# Patient Record
Sex: FEMALE | Race: WHITE | NOT HISPANIC OR LATINO | Employment: UNEMPLOYED | URBAN - NONMETROPOLITAN AREA
[De-identification: names, ages, dates, MRNs, and addresses within clinical notes are randomized per-mention and may not be internally consistent; named-entity substitution may affect disease eponyms.]

---

## 2019-02-21 ENCOUNTER — HOSPITAL ENCOUNTER (EMERGENCY)
Facility: HOSPITAL | Age: 84
Discharge: HOME | End: 2019-02-22
Attending: EMERGENCY MEDICINE | Admitting: EMERGENCY MEDICINE

## 2019-02-21 ENCOUNTER — APPOINTMENT (OUTPATIENT)
Dept: GENERAL RADIOLOGY | Facility: HOSPITAL | Age: 84
End: 2019-02-21

## 2019-02-21 ENCOUNTER — APPOINTMENT (OUTPATIENT)
Dept: CT IMAGING | Facility: HOSPITAL | Age: 84
End: 2019-02-21

## 2019-02-21 DIAGNOSIS — I10 ESSENTIAL HYPERTENSION: ICD-10-CM

## 2019-02-21 DIAGNOSIS — S22.41XA CLOSED FRACTURE OF MULTIPLE RIBS OF RIGHT SIDE, INITIAL ENCOUNTER: ICD-10-CM

## 2019-02-21 DIAGNOSIS — S22.080A T12 COMPRESSION FRACTURE (HCC): ICD-10-CM

## 2019-02-21 DIAGNOSIS — R42 VERTIGO: Primary | ICD-10-CM

## 2019-02-21 LAB
ALBUMIN SERPL-MCNC: 4.1 G/DL (ref 3.4–4.8)
ALBUMIN/GLOB SERPL: 1.2 G/DL (ref 1.5–2.5)
ALP SERPL-CCNC: 85 U/L (ref 35–104)
ALT SERPL W P-5'-P-CCNC: 12 U/L (ref 10–36)
ANION GAP SERPL CALCULATED.3IONS-SCNC: 10.5 MMOL/L (ref 3.6–11.2)
AST SERPL-CCNC: 22 U/L (ref 10–30)
BASOPHILS # BLD AUTO: 0.02 10*3/MM3 (ref 0–0.3)
BASOPHILS NFR BLD AUTO: 0.2 % (ref 0–2)
BILIRUB SERPL-MCNC: 0.4 MG/DL (ref 0.2–1.8)
BILIRUB UR QL STRIP: NEGATIVE
BUN BLD-MCNC: 28 MG/DL (ref 7–21)
BUN/CREAT SERPL: 24.3 (ref 7–25)
CALCIUM SPEC-SCNC: 9.5 MG/DL (ref 7.7–10)
CHLORIDE SERPL-SCNC: 106 MMOL/L (ref 99–112)
CLARITY UR: CLEAR
CO2 SERPL-SCNC: 24.5 MMOL/L (ref 24.3–31.9)
COLOR UR: NORMAL
CREAT BLD-MCNC: 1.15 MG/DL (ref 0.43–1.29)
DEPRECATED RDW RBC AUTO: 48.5 FL (ref 37–54)
EOSINOPHIL # BLD AUTO: 0.64 10*3/MM3 (ref 0–0.7)
EOSINOPHIL NFR BLD AUTO: 7.8 % (ref 0–7)
ERYTHROCYTE [DISTWIDTH] IN BLOOD BY AUTOMATED COUNT: 14.9 % (ref 11.5–14.5)
GFR SERPL CREATININE-BSD FRML MDRD: 44 ML/MIN/1.73
GLOBULIN UR ELPH-MCNC: 3.3 GM/DL
GLUCOSE BLD-MCNC: 98 MG/DL (ref 70–110)
GLUCOSE BLDC GLUCOMTR-MCNC: 89 MG/DL (ref 70–130)
GLUCOSE UR STRIP-MCNC: NEGATIVE MG/DL
HCT VFR BLD AUTO: 38.2 % (ref 37–47)
HGB BLD-MCNC: 12.2 G/DL (ref 12–16)
HGB UR QL STRIP.AUTO: NEGATIVE
HOLD SPECIMEN: NORMAL
HOLD SPECIMEN: NORMAL
IMM GRANULOCYTES # BLD AUTO: 0.02 10*3/MM3 (ref 0–0.03)
IMM GRANULOCYTES NFR BLD AUTO: 0.2 % (ref 0–0.5)
KETONES UR QL STRIP: NEGATIVE
LEUKOCYTE ESTERASE UR QL STRIP.AUTO: NEGATIVE
LYMPHOCYTES # BLD AUTO: 1.56 10*3/MM3 (ref 1–3)
LYMPHOCYTES NFR BLD AUTO: 19 % (ref 16–46)
MAGNESIUM SERPL-MCNC: 2.5 MG/DL (ref 1.7–2.6)
MCH RBC QN AUTO: 29.5 PG (ref 27–33)
MCHC RBC AUTO-ENTMCNC: 31.9 G/DL (ref 33–37)
MCV RBC AUTO: 92.3 FL (ref 80–94)
MONOCYTES # BLD AUTO: 0.8 10*3/MM3 (ref 0.1–0.9)
MONOCYTES NFR BLD AUTO: 9.8 % (ref 0–12)
NEUTROPHILS # BLD AUTO: 5.15 10*3/MM3 (ref 1.4–6.5)
NEUTROPHILS NFR BLD AUTO: 63 % (ref 40–75)
NITRITE UR QL STRIP: NEGATIVE
OSMOLALITY SERPL CALC.SUM OF ELEC: 286.7 MOSM/KG (ref 273–305)
PH UR STRIP.AUTO: 7 [PH] (ref 5–8)
PLATELET # BLD AUTO: 291 10*3/MM3 (ref 130–400)
PMV BLD AUTO: 9.6 FL (ref 6–10)
POTASSIUM BLD-SCNC: 4 MMOL/L (ref 3.5–5.3)
PROT SERPL-MCNC: 7.4 G/DL (ref 6–8)
PROT UR QL STRIP: NEGATIVE
RBC # BLD AUTO: 4.14 10*6/MM3 (ref 4.2–5.4)
SODIUM BLD-SCNC: 141 MMOL/L (ref 135–153)
SP GR UR STRIP: 1.01 (ref 1–1.03)
TROPONIN I SERPL-MCNC: 0.01 NG/ML
UROBILINOGEN UR QL STRIP: NORMAL
WBC NRBC COR # BLD: 8.19 10*3/MM3 (ref 4.5–12.5)
WHOLE BLOOD HOLD SPECIMEN: NORMAL
WHOLE BLOOD HOLD SPECIMEN: NORMAL

## 2019-02-21 PROCEDURE — 82962 GLUCOSE BLOOD TEST: CPT

## 2019-02-21 PROCEDURE — 80053 COMPREHEN METABOLIC PANEL: CPT | Performed by: EMERGENCY MEDICINE

## 2019-02-21 PROCEDURE — 96361 HYDRATE IV INFUSION ADD-ON: CPT

## 2019-02-21 PROCEDURE — 72128 CT CHEST SPINE W/O DYE: CPT

## 2019-02-21 PROCEDURE — 84484 ASSAY OF TROPONIN QUANT: CPT | Performed by: EMERGENCY MEDICINE

## 2019-02-21 PROCEDURE — 36415 COLL VENOUS BLD VENIPUNCTURE: CPT

## 2019-02-21 PROCEDURE — 93005 ELECTROCARDIOGRAM TRACING: CPT | Performed by: EMERGENCY MEDICINE

## 2019-02-21 PROCEDURE — 81003 URINALYSIS AUTO W/O SCOPE: CPT | Performed by: EMERGENCY MEDICINE

## 2019-02-21 PROCEDURE — 73523 X-RAY EXAM HIPS BI 5/> VIEWS: CPT

## 2019-02-21 PROCEDURE — 71260 CT THORAX DX C+: CPT

## 2019-02-21 PROCEDURE — 72125 CT NECK SPINE W/O DYE: CPT

## 2019-02-21 PROCEDURE — 72131 CT LUMBAR SPINE W/O DYE: CPT | Performed by: RADIOLOGY

## 2019-02-21 PROCEDURE — 96374 THER/PROPH/DIAG INJ IV PUSH: CPT

## 2019-02-21 PROCEDURE — 72131 CT LUMBAR SPINE W/O DYE: CPT

## 2019-02-21 PROCEDURE — 25010000002 IOPAMIDOL 61 % SOLUTION: Performed by: EMERGENCY MEDICINE

## 2019-02-21 PROCEDURE — 71260 CT THORAX DX C+: CPT | Performed by: RADIOLOGY

## 2019-02-21 PROCEDURE — 72128 CT CHEST SPINE W/O DYE: CPT | Performed by: RADIOLOGY

## 2019-02-21 PROCEDURE — 73523 X-RAY EXAM HIPS BI 5/> VIEWS: CPT | Performed by: RADIOLOGY

## 2019-02-21 PROCEDURE — 71045 X-RAY EXAM CHEST 1 VIEW: CPT | Performed by: RADIOLOGY

## 2019-02-21 PROCEDURE — 25010000002 HYDRALAZINE PER 20 MG: Performed by: PHYSICIAN ASSISTANT

## 2019-02-21 PROCEDURE — 99285 EMERGENCY DEPT VISIT HI MDM: CPT

## 2019-02-21 PROCEDURE — 93010 ELECTROCARDIOGRAM REPORT: CPT | Performed by: INTERNAL MEDICINE

## 2019-02-21 PROCEDURE — 70450 CT HEAD/BRAIN W/O DYE: CPT | Performed by: RADIOLOGY

## 2019-02-21 PROCEDURE — 83735 ASSAY OF MAGNESIUM: CPT | Performed by: EMERGENCY MEDICINE

## 2019-02-21 PROCEDURE — 70450 CT HEAD/BRAIN W/O DYE: CPT

## 2019-02-21 PROCEDURE — 72125 CT NECK SPINE W/O DYE: CPT | Performed by: RADIOLOGY

## 2019-02-21 PROCEDURE — 71045 X-RAY EXAM CHEST 1 VIEW: CPT

## 2019-02-21 PROCEDURE — 85025 COMPLETE CBC W/AUTO DIFF WBC: CPT | Performed by: FAMILY MEDICINE

## 2019-02-21 RX ORDER — SODIUM CHLORIDE 9 MG/ML
125 INJECTION, SOLUTION INTRAVENOUS CONTINUOUS
Status: DISCONTINUED | OUTPATIENT
Start: 2019-02-21 | End: 2019-02-22 | Stop reason: HOSPADM

## 2019-02-21 RX ORDER — SODIUM CHLORIDE 0.9 % (FLUSH) 0.9 %
10 SYRINGE (ML) INJECTION AS NEEDED
Status: DISCONTINUED | OUTPATIENT
Start: 2019-02-21 | End: 2019-02-22 | Stop reason: HOSPADM

## 2019-02-21 RX ORDER — HYDRALAZINE HYDROCHLORIDE 20 MG/ML
10 INJECTION INTRAMUSCULAR; INTRAVENOUS ONCE
Status: COMPLETED | OUTPATIENT
Start: 2019-02-21 | End: 2019-02-21

## 2019-02-21 RX ADMIN — IOPAMIDOL 90 ML: 612 INJECTION, SOLUTION INTRAVENOUS at 23:19

## 2019-02-21 RX ADMIN — HYDRALAZINE HYDROCHLORIDE 10 MG: 20 INJECTION INTRAMUSCULAR; INTRAVENOUS at 21:01

## 2019-02-21 RX ADMIN — SODIUM CHLORIDE 125 ML/HR: 9 INJECTION, SOLUTION INTRAVENOUS at 21:00

## 2019-02-22 VITALS
OXYGEN SATURATION: 99 % | WEIGHT: 100 LBS | RESPIRATION RATE: 16 BRPM | SYSTOLIC BLOOD PRESSURE: 169 MMHG | HEART RATE: 72 BPM | HEIGHT: 61 IN | DIASTOLIC BLOOD PRESSURE: 87 MMHG | TEMPERATURE: 98.2 F | BODY MASS INDEX: 18.88 KG/M2

## 2019-02-22 RX ORDER — MECLIZINE HYDROCHLORIDE 25 MG/1
25 TABLET ORAL 3 TIMES DAILY PRN
Qty: 21 TABLET | Refills: 0 | Status: SHIPPED | OUTPATIENT
Start: 2019-02-22 | End: 2019-03-01

## 2019-02-22 RX ORDER — HYDROCHLOROTHIAZIDE 12.5 MG/1
12.5 TABLET ORAL DAILY
Qty: 7 TABLET | Refills: 0 | Status: SHIPPED | OUTPATIENT
Start: 2019-02-22 | End: 2019-03-01

## 2019-02-22 RX ORDER — ACETAMINOPHEN AND CODEINE PHOSPHATE 300; 30 MG/1; MG/1
1 TABLET ORAL EVERY 6 HOURS PRN
Qty: 12 TABLET | Refills: 0 | Status: SHIPPED | OUTPATIENT
Start: 2019-02-22 | End: 2019-02-25

## 2019-02-22 NOTE — ED NOTES
Pt resting quietly on stretcher with eyes closed.  Pt AAOx4 with no resp distress noted, respirations even and unlabored.  Pt denies any needs at this time.  Skin PWD.  Pt family at bedside. Will continue to monitor and follow plan of care.  Bed rails up x2, bed in lowest position, call light in reach.           Michelle Thomas, RN  02/22/19 3314

## 2019-02-22 NOTE — DISCHARGE INSTRUCTIONS
Please take meclizine as needed for dizziness. Please use your blood pressure pill daily. Please take pain medication as needed. Please see one of the following PCPs in 2 days and see spine specialist at  as well or return to ER if symptoms worsen.

## 2019-02-22 NOTE — ED NOTES
Pt resting quietly on stretcher with eyes closed.  No resp distress noted, respirations even and unlabored.  Pt denies any needs at this time.  Skin PWD.  Pt family at bedside. Will continue to monitor and follow plan of care.  Bed rails up x2, bed in lowest position, call light in reach.           Michelle Thomas, RN  02/22/19 2570

## 2019-02-22 NOTE — ED NOTES
Pt resting quietly on stretcher with no complaints.  Pt AAOx4 with no resp distress noted, respirations even and unlabored.  Pt denies any needs at this time.  Skin PWD.  Pt family at bedside. Will continue to monitor and follow plan of care.  Bed rails up x2, bed in lowest position, call light in reach.           Michelle Thomas, RN  02/22/19 4229

## 2019-02-22 NOTE — ED PROVIDER NOTES
Subjective   Patient is oriented to self, location and date.         Fall   Mechanism of injury: fall    Injury location:  Head/neck (back)  Head/neck injury location:  Head, R neck and L neck (Back)  Incident location:  Home  Time since incident:  2 days  Arrived directly from scene: no    Fall:     Fall occurred:  Walking (Unwittnessed but patient said she just became dizzy and fell; her brother found her on the floor)    Impact surface:  Carpet    Point of impact:  Back    Entrapped after fall: no    Suspicion of alcohol use: no    Suspicion of drug use: no    Tetanus status:  Up to date  Associated symptoms: back pain and headaches    Associated symptoms: no abdominal pain, no blindness, no chest pain, no difficulty breathing, no hearing loss, no loss of consciousness, no nausea, no neck pain, no seizures and no vomiting    Risk factors: no AICD, no anticoagulation therapy, no asthma, no beta blocker therapy, no CABG, no CAD, no CHF, no COPD, no diabetes, no dialysis, no hemophilia, no kidney disease, no pacemaker, no past MI, not pregnant and no steroid use        Review of Systems   Constitutional: Negative.  Negative for fever.   HENT: Negative for hearing loss.    Eyes: Negative for blindness.   Respiratory: Negative.    Cardiovascular: Negative.  Negative for chest pain.   Gastrointestinal: Negative.  Negative for abdominal pain, nausea and vomiting.   Endocrine: Negative.    Genitourinary: Negative.  Negative for dysuria.   Musculoskeletal: Positive for back pain. Negative for gait problem, joint swelling, myalgias, neck pain and neck stiffness.   Skin: Negative.    Neurological: Positive for dizziness and headaches. Negative for tremors, seizures, loss of consciousness, syncope, facial asymmetry, speech difficulty, weakness, light-headedness and numbness.   Psychiatric/Behavioral: Negative.    All other systems reviewed and are negative.      No past medical history on file.    No Known Allergies    No  past surgical history on file.    Family History   Family history unknown: Yes       Social History     Socioeconomic History   • Marital status:      Spouse name: Not on file   • Number of children: Not on file   • Years of education: Not on file   • Highest education level: Not on file   Tobacco Use   • Smoking status: Never Smoker   Substance and Sexual Activity   • Alcohol use: No   • Drug use: No   • Sexual activity: Defer           Objective   Physical Exam   Constitutional: She is oriented to person, place, and time. She appears well-developed and well-nourished. No distress.   HENT:   Head: Normocephalic and atraumatic.   Right Ear: External ear normal.   Left Ear: External ear normal.   Nose: Nose normal.   Eyes: Conjunctivae and EOM are normal. Pupils are equal, round, and reactive to light.   Neck: Normal range of motion. Neck supple. No JVD present. No tracheal deviation present.   Cardiovascular: Normal rate, regular rhythm and normal heart sounds. Exam reveals no gallop and no friction rub.   No murmur heard.  Pulmonary/Chest: Effort normal and breath sounds normal. No stridor. No respiratory distress. She has no wheezes. She has no rales. She exhibits no tenderness.   Abdominal: Soft. Bowel sounds are normal. She exhibits no distension and no mass. There is no tenderness. There is no guarding.   Musculoskeletal: Normal range of motion. She exhibits no edema, tenderness or deformity.   Good ROM in bilateral hips with no bruising or tenderness to palpation    Neurological: She is alert and oriented to person, place, and time. She is not disoriented. She displays normal reflexes. No cranial nerve deficit or sensory deficit. Coordination and gait normal.   Normal EOMs and pupillary reflexes bilaterally. 5/5  and plantar flexion strength bilaterally. Can elevate all 4 extremities and hold them at 90 degrees. No facial asymmetry or slurred speech.    Skin: Skin is warm and dry. No rash noted. She  is not diaphoretic. No erythema. No pallor.   Psychiatric: She has a normal mood and affect. Her behavior is normal. Thought content normal.   Nursing note and vitals reviewed.      Splint - Cast - Strapping  Date/Time: 2/22/2019 1:07 AM  Performed by: Leelee Hernandez PA  Authorized by: Max Sprague MD     Consent:     Consent obtained:  Verbal    Consent given by:  Patient    Risks discussed:  Discoloration, numbness, pain and swelling    Alternatives discussed:  Referral, observation, alternative treatment, delayed treatment and no treatment  Pre-procedure details:     Sensation:  Normal    Skin color:  Normal   Procedure details:     Location: Back.    Strapping: no      Splint type: Back.    Supplies:  Prefabricated splint  Post-procedure details:     Pain:  Improved    Sensation:  Normal    Skin color:  Normal    Patient tolerance of procedure:  Tolerated well, no immediate complications               ED Course  ED Course as of Feb 22 0108   Thu Feb 21, 2019   2211 Heart shadow that is abnormal; needs CT for further eval per Dr. Sprague.  XR Chest 1 View [MM]   2213 No acute fracture per Dr. Sprague.  XR Hips Bilateral With or Without Pelvis 5 View [MM]   2215 Acute fracture of the T12 vertebral body with 5-6 mm posterior displacement of the T12 vertebral body into the adjacent thoracic spinal canal; fractures of the right fifth and sixth posterior ribs per [facility] report.  CT Thoracic Spine Without Contrast [MM]   2217 Acute fracture of the T12 vertebral body with 5-6 mm posterior displacement of the T12 vertebral body into the adjacent thoracic spinal canal; severe central canal stenosis at L4-5; mild to moderate central canal stenosis at L3-4 per [facility] report.  CT Lumbar Spine Without Contrast [MM]   2313 No acute fracture or subluxation per [facility] report.  CT Cervical Spine Without Contrast [MM]   2326 Normal sinus rhythm; non specific T wave changes per Dr. Kay.  ECG 12 Lead [MM]   2340  Bilateral trace pleural fluid with minimal posterolateral right lung base atelectasis; T12 compression fracture; fractures of right 5th and 6th posterior ribs per [facility] report.  CT Chest With Contrast [MM]   2341 No acute intracranial findings; diffuse cerebral atrophy and age related periventricular white matter changes per [facility] report.  CT Head Without Contrast [MM]   Fri Feb 22, 2019   0015 Spoke with Dr. Fu spine at ; he says that since that patient is 89 years old, has no neuro defecits and is ambulatory, that her T12 fracture can be treated outpatient at their facility under the care of Dr. Mcleod.   [MM]   0032 Patient diagnosed with T12 fracture, right 5th and 6th rib fractures, hypertension, vertigo. Will be d/c home with rx for HCTZ, meclizine, tylenol 3. Will see PCP in 2 days and spine specialist in 2 days at  or return to ER if symptoms worsen.  [MM]   0038 Valdemar reveals no previous prescriptions.   [MM]      ED Course User Index  [MM] Leelee Hernandez PA                  MDM  Number of Diagnoses or Management Options  Closed fracture of multiple ribs of right side, initial encounter:   Essential hypertension:   T12 compression fracture (CMS/HCC):   Vertigo:      Amount and/or Complexity of Data Reviewed  Clinical lab tests: ordered and reviewed  Tests in the radiology section of CPT®: ordered and reviewed  Discuss the patient with other providers: yes          Final diagnoses:   Vertigo   Essential hypertension   T12 compression fracture (CMS/HCC)   Closed fracture of multiple ribs of right side, initial encounter            Leelee Hernandez PA  02/22/19 0038       Leelee Hernandez PA  02/22/19 0108

## 2019-02-22 NOTE — ED NOTES
Pt resting quietly on stretcher with no complaints.  Pt AAOx4 with no resp distress noted, respirations even and unlabored.  Pt denies any needs at this time.  Skin PWD.  Pt family at bedside. Will continue to monitor and follow plan of care.  Bed rails up x2, bed in lowest position, call light in reach.           Michelle Thomas, RN  02/22/19 0066

## 2019-02-22 NOTE — ED NOTES
On hold with [facility], they are getting someone in spine to speak with Leelee.     Franca Thurman  02/22/19 0013

## 2019-08-03 ENCOUNTER — HOSPITAL ENCOUNTER (INPATIENT)
Facility: HOSPITAL | Age: 84
LOS: 5 days | Discharge: NURSING, REHAB OR LONG-TERM CARE | End: 2019-08-08
Attending: FAMILY MEDICINE | Admitting: INTERNAL MEDICINE

## 2019-08-03 ENCOUNTER — APPOINTMENT (OUTPATIENT)
Dept: GENERAL RADIOLOGY | Facility: HOSPITAL | Age: 84
End: 2019-08-03

## 2019-08-03 DIAGNOSIS — I16.1 HYPERTENSIVE EMERGENCY: Primary | ICD-10-CM

## 2019-08-03 DIAGNOSIS — I50.9 CONGESTIVE HEART FAILURE, UNSPECIFIED HF CHRONICITY, UNSPECIFIED HEART FAILURE TYPE (HCC): ICD-10-CM

## 2019-08-03 DIAGNOSIS — E86.0 DEHYDRATION: ICD-10-CM

## 2019-08-03 LAB
A-A DO2: 26.5 MMHG (ref 0–300)
ALBUMIN SERPL-MCNC: 3.89 G/DL (ref 3.5–5.2)
ALBUMIN/GLOB SERPL: 1 G/DL
ALP SERPL-CCNC: 84 U/L (ref 39–117)
ALT SERPL W P-5'-P-CCNC: 18 U/L (ref 1–33)
ANION GAP SERPL CALCULATED.3IONS-SCNC: 17.5 MMOL/L (ref 5–15)
ARTERIAL PATENCY WRIST A: ABNORMAL
AST SERPL-CCNC: 26 U/L (ref 1–32)
ATMOSPHERIC PRESS: 729 MMHG
BASE EXCESS BLDA CALC-SCNC: -2.4 MMOL/L
BASOPHILS # BLD AUTO: 0.01 10*3/MM3 (ref 0–0.2)
BASOPHILS NFR BLD AUTO: 0.1 % (ref 0–1.5)
BDY SITE: ABNORMAL
BILIRUB SERPL-MCNC: 0.4 MG/DL (ref 0.2–1.2)
BODY TEMPERATURE: 98.6 C
BUN BLD-MCNC: 38 MG/DL (ref 8–23)
BUN/CREAT SERPL: 26.4 (ref 7–25)
CALCIUM SPEC-SCNC: 9.5 MG/DL (ref 8.2–9.6)
CHLORIDE SERPL-SCNC: 102 MMOL/L (ref 98–107)
CO2 SERPL-SCNC: 23.5 MMOL/L (ref 22–29)
COHGB MFR BLD: 1.5 % (ref 0–5)
CREAT BLD-MCNC: 1.44 MG/DL (ref 0.57–1)
CRP SERPL-MCNC: 0.28 MG/DL (ref 0–0.5)
D-LACTATE SERPL-SCNC: 1.4 MMOL/L (ref 0.5–2)
DEPRECATED RDW RBC AUTO: 49.2 FL (ref 37–54)
EOSINOPHIL # BLD AUTO: 0.44 10*3/MM3 (ref 0–0.4)
EOSINOPHIL NFR BLD AUTO: 6.1 % (ref 0.3–6.2)
ERYTHROCYTE [DISTWIDTH] IN BLOOD BY AUTOMATED COUNT: 15 % (ref 12.3–15.4)
GFR SERPL CREATININE-BSD FRML MDRD: 34 ML/MIN/1.73
GLOBULIN UR ELPH-MCNC: 3.7 GM/DL
GLUCOSE BLD-MCNC: 95 MG/DL (ref 65–99)
HCO3 BLDA-SCNC: 21.2 MMOL/L (ref 22–26)
HCT VFR BLD AUTO: 42.1 % (ref 34–46.6)
HCT VFR BLD CALC: 40 % (ref 37–47)
HGB BLD-MCNC: 13.4 G/DL (ref 12–15.9)
HGB BLDA-MCNC: 13.5 G/DL (ref 12–16)
HOLD SPECIMEN: NORMAL
HOLD SPECIMEN: NORMAL
HOROWITZ INDEX BLD+IHG-RTO: 21 %
IMM GRANULOCYTES # BLD AUTO: 0.01 10*3/MM3 (ref 0–0.05)
IMM GRANULOCYTES NFR BLD AUTO: 0.1 % (ref 0–0.5)
LYMPHOCYTES # BLD AUTO: 1.39 10*3/MM3 (ref 0.7–3.1)
LYMPHOCYTES NFR BLD AUTO: 19.4 % (ref 19.6–45.3)
MCH RBC QN AUTO: 29.6 PG (ref 26.6–33)
MCHC RBC AUTO-ENTMCNC: 31.8 G/DL (ref 31.5–35.7)
MCV RBC AUTO: 93.1 FL (ref 79–97)
METHGB BLD QL: 0.2 % (ref 0–3)
MODALITY: ABNORMAL
MONOCYTES # BLD AUTO: 0.42 10*3/MM3 (ref 0.1–0.9)
MONOCYTES NFR BLD AUTO: 5.9 % (ref 5–12)
NEUTROPHILS # BLD AUTO: 4.9 10*3/MM3 (ref 1.7–7)
NEUTROPHILS NFR BLD AUTO: 68.4 % (ref 42.7–76)
NT-PROBNP SERPL-MCNC: 1929 PG/ML (ref 5–1800)
OXYHGB MFR BLDV: 93.6 % (ref 85–100)
PCO2 BLDA: 33.4 MM HG (ref 35–45)
PH BLDA: 7.42 PH UNITS (ref 7.35–7.45)
PLATELET # BLD AUTO: 233 10*3/MM3 (ref 140–450)
PMV BLD AUTO: 9.3 FL (ref 6–12)
PO2 BLDA: 76.7 MM HG (ref 80–100)
POTASSIUM BLD-SCNC: 4.3 MMOL/L (ref 3.5–5.2)
PROT SERPL-MCNC: 7.6 G/DL (ref 6–8.5)
RBC # BLD AUTO: 4.52 10*6/MM3 (ref 3.77–5.28)
SAO2 % BLDCOA: 95.2 % (ref 90–100)
SODIUM BLD-SCNC: 143 MMOL/L (ref 136–145)
TROPONIN T SERPL-MCNC: <0.01 NG/ML (ref 0–0.03)
WBC NRBC COR # BLD: 7.17 10*3/MM3 (ref 3.4–10.8)
WHOLE BLOOD HOLD SPECIMEN: NORMAL
WHOLE BLOOD HOLD SPECIMEN: NORMAL

## 2019-08-03 PROCEDURE — 36600 WITHDRAWAL OF ARTERIAL BLOOD: CPT | Performed by: NURSE PRACTITIONER

## 2019-08-03 PROCEDURE — 84484 ASSAY OF TROPONIN QUANT: CPT | Performed by: FAMILY MEDICINE

## 2019-08-03 PROCEDURE — 25010000002 HYDRALAZINE PER 20 MG: Performed by: NURSE PRACTITIONER

## 2019-08-03 PROCEDURE — 85025 COMPLETE CBC W/AUTO DIFF WBC: CPT | Performed by: FAMILY MEDICINE

## 2019-08-03 PROCEDURE — 99285 EMERGENCY DEPT VISIT HI MDM: CPT

## 2019-08-03 PROCEDURE — 80053 COMPREHEN METABOLIC PANEL: CPT | Performed by: FAMILY MEDICINE

## 2019-08-03 PROCEDURE — 83050 HGB METHEMOGLOBIN QUAN: CPT | Performed by: NURSE PRACTITIONER

## 2019-08-03 PROCEDURE — 83880 ASSAY OF NATRIURETIC PEPTIDE: CPT | Performed by: FAMILY MEDICINE

## 2019-08-03 PROCEDURE — 71045 X-RAY EXAM CHEST 1 VIEW: CPT

## 2019-08-03 PROCEDURE — 83605 ASSAY OF LACTIC ACID: CPT | Performed by: NURSE PRACTITIONER

## 2019-08-03 PROCEDURE — 86140 C-REACTIVE PROTEIN: CPT | Performed by: NURSE PRACTITIONER

## 2019-08-03 PROCEDURE — 82805 BLOOD GASES W/O2 SATURATION: CPT | Performed by: NURSE PRACTITIONER

## 2019-08-03 PROCEDURE — 87040 BLOOD CULTURE FOR BACTERIA: CPT | Performed by: NURSE PRACTITIONER

## 2019-08-03 PROCEDURE — 93005 ELECTROCARDIOGRAM TRACING: CPT | Performed by: FAMILY MEDICINE

## 2019-08-03 PROCEDURE — 93010 ELECTROCARDIOGRAM REPORT: CPT | Performed by: INTERNAL MEDICINE

## 2019-08-03 PROCEDURE — 82375 ASSAY CARBOXYHB QUANT: CPT | Performed by: NURSE PRACTITIONER

## 2019-08-03 RX ORDER — SODIUM CHLORIDE 0.9 % (FLUSH) 0.9 %
10 SYRINGE (ML) INJECTION AS NEEDED
Status: DISCONTINUED | OUTPATIENT
Start: 2019-08-03 | End: 2019-08-08 | Stop reason: HOSPADM

## 2019-08-03 RX ORDER — CLONIDINE HYDROCHLORIDE 0.1 MG/1
0.1 TABLET ORAL ONCE
Status: COMPLETED | OUTPATIENT
Start: 2019-08-03 | End: 2019-08-03

## 2019-08-03 RX ORDER — HYDRALAZINE HYDROCHLORIDE 20 MG/ML
10 INJECTION INTRAMUSCULAR; INTRAVENOUS ONCE
Status: COMPLETED | OUTPATIENT
Start: 2019-08-03 | End: 2019-08-03

## 2019-08-03 RX ADMIN — CLONIDINE HYDROCHLORIDE 0.1 MG: 0.1 TABLET ORAL at 21:00

## 2019-08-03 RX ADMIN — HYDRALAZINE HYDROCHLORIDE 10 MG: 20 INJECTION INTRAMUSCULAR; INTRAVENOUS at 22:22

## 2019-08-03 RX ADMIN — SODIUM CHLORIDE 250 ML: 9 INJECTION, SOLUTION INTRAVENOUS at 20:25

## 2019-08-03 NOTE — ED PROVIDER NOTES
Subjective   Family reports that patient has had shortness of breath and cough for about a week.  Report that patient has had some chest pain today. States that patient has had increasing confusion and is having more frequent falls. State that they feel patient would benefit from nursing home placement as they dont feel they can adequately take care of patient, that patient doesn't want anyone living with her, and they do not feel its safe from patient's increasing confusion and deteriorating health that she live alone.        History provided by:  Patient   used: No    Shortness of Breath   Severity:  Moderate  Onset quality:  Sudden  Duration:  1 week  Timing:  Constant  Progression:  Waxing and waning  Chronicity:  Recurrent  Context: not activity, not animal exposure, not emotional upset, not known allergens, not occupational exposure and not strong odors    Relieved by:  Nothing  Worsened by:  Nothing  Ineffective treatments:  None tried  Associated symptoms: cough and wheezing    Associated symptoms: no abdominal pain, no chest pain, no claudication, no fever, no headaches, no hemoptysis, no sore throat, no sputum production and no syncope    Risk factors: no recent alcohol use, no family hx of DVT, no hx of PE/DVT and no prolonged immobilization        Review of Systems   Constitutional: Negative.  Negative for fever.   HENT: Negative.  Negative for sore throat.    Eyes: Negative.    Respiratory: Positive for cough, shortness of breath and wheezing. Negative for hemoptysis and sputum production.    Cardiovascular: Negative.  Negative for chest pain, claudication and syncope.   Gastrointestinal: Negative.  Negative for abdominal pain.   Endocrine: Negative.    Genitourinary: Negative.    Musculoskeletal: Negative.    Skin: Negative.    Allergic/Immunologic: Negative.    Neurological: Negative.  Negative for headaches.   Hematological: Negative.    Psychiatric/Behavioral: Negative.         History reviewed. No pertinent past medical history.    No Known Allergies    History reviewed. No pertinent surgical history.    Family History   Family history unknown: Yes       Social History     Socioeconomic History   • Marital status:      Spouse name: Not on file   • Number of children: Not on file   • Years of education: Not on file   • Highest education level: Not on file   Tobacco Use   • Smoking status: Never Smoker   Substance and Sexual Activity   • Alcohol use: No   • Drug use: No   • Sexual activity: Defer           Objective   Physical Exam   Constitutional: She appears well-developed and well-nourished.   HENT:   Head: Normocephalic.   Right Ear: External ear normal.   Left Ear: External ear normal.   Mouth/Throat: Oropharynx is clear and moist.   Eyes: Conjunctivae and EOM are normal. Pupils are equal, round, and reactive to light.   Neck: Normal range of motion. Neck supple.   Cardiovascular: Normal rate, regular rhythm, normal heart sounds and intact distal pulses.   Pulmonary/Chest: Effort normal. She has wheezes.   Mildly decreased breath sounds     Abdominal: Soft. Bowel sounds are normal.   Musculoskeletal: Normal range of motion.   Neurological: She is alert.   Oriented to person and place    Skin: Skin is warm and dry. Capillary refill takes less than 2 seconds.   Psychiatric: She has a normal mood and affect. Her behavior is normal. Thought content normal.   Nursing note and vitals reviewed.      Procedures           ED Course  ED Course as of Aug 08 2002   Sat Aug 03, 2019   2111 Discussed with Dr. Narvaez. Advises to contact with blood pressure in 30-45 minutes and she would decide further for admission to see if clonidine helps blood pressure  [MARLINE]   2214 Ordered 10mg IV hydralazine.  BP: (!) 196/117 [EVELYN]      ED Course User Index  [MARLINE] David Rodríguez, APRN  [EVELYN] Ashlee Hudson, APRN                  [facility]      Final diagnoses:   Hypertensive emergency   Dehydration    Congestive heart failure, unspecified HF chronicity, unspecified heart failure type (CMS/[facility])            David Rodríguez, APRN  08/08/19 2003

## 2019-08-04 ENCOUNTER — APPOINTMENT (OUTPATIENT)
Dept: CARDIOLOGY | Facility: HOSPITAL | Age: 84
End: 2019-08-04

## 2019-08-04 ENCOUNTER — APPOINTMENT (OUTPATIENT)
Dept: CT IMAGING | Facility: HOSPITAL | Age: 84
End: 2019-08-04

## 2019-08-04 LAB
ALBUMIN SERPL-MCNC: 3.47 G/DL (ref 3.5–5.2)
ALBUMIN/GLOB SERPL: 1 G/DL
ALP SERPL-CCNC: 77 U/L (ref 39–117)
ALT SERPL W P-5'-P-CCNC: 16 U/L (ref 1–33)
ANION GAP SERPL CALCULATED.3IONS-SCNC: 14.4 MMOL/L (ref 5–15)
AST SERPL-CCNC: 23 U/L (ref 1–32)
BACTERIA UR QL AUTO: ABNORMAL /HPF
BASOPHILS # BLD AUTO: 0.02 10*3/MM3 (ref 0–0.2)
BASOPHILS NFR BLD AUTO: 0.2 % (ref 0–1.5)
BILIRUB SERPL-MCNC: 0.4 MG/DL (ref 0.2–1.2)
BILIRUB UR QL STRIP: NEGATIVE
BUN BLD-MCNC: 36 MG/DL (ref 8–23)
BUN/CREAT SERPL: 27.9 (ref 7–25)
CALCIUM SPEC-SCNC: 9.3 MG/DL (ref 8.2–9.6)
CHLORIDE SERPL-SCNC: 106 MMOL/L (ref 98–107)
CLARITY UR: CLEAR
CO2 SERPL-SCNC: 21.6 MMOL/L (ref 22–29)
COLOR UR: YELLOW
CREAT BLD-MCNC: 1.29 MG/DL (ref 0.57–1)
DEPRECATED RDW RBC AUTO: 49.4 FL (ref 37–54)
EOSINOPHIL # BLD AUTO: 0.83 10*3/MM3 (ref 0–0.4)
EOSINOPHIL NFR BLD AUTO: 8.8 % (ref 0.3–6.2)
ERYTHROCYTE [DISTWIDTH] IN BLOOD BY AUTOMATED COUNT: 15 % (ref 12.3–15.4)
FOLATE SERPL-MCNC: 16.5 NG/ML (ref 4.78–24.2)
GFR SERPL CREATININE-BSD FRML MDRD: 39 ML/MIN/1.73
GLOBULIN UR ELPH-MCNC: 3.6 GM/DL
GLUCOSE BLD-MCNC: 94 MG/DL (ref 65–99)
GLUCOSE UR STRIP-MCNC: NEGATIVE MG/DL
HCT VFR BLD AUTO: 40.7 % (ref 34–46.6)
HGB BLD-MCNC: 12.8 G/DL (ref 12–15.9)
HGB UR QL STRIP.AUTO: NEGATIVE
HYALINE CASTS UR QL AUTO: ABNORMAL /LPF
IMM GRANULOCYTES # BLD AUTO: 0.01 10*3/MM3 (ref 0–0.05)
IMM GRANULOCYTES NFR BLD AUTO: 0.1 % (ref 0–0.5)
KETONES UR QL STRIP: ABNORMAL
LEUKOCYTE ESTERASE UR QL STRIP.AUTO: ABNORMAL
LYMPHOCYTES # BLD AUTO: 1.52 10*3/MM3 (ref 0.7–3.1)
LYMPHOCYTES NFR BLD AUTO: 16.1 % (ref 19.6–45.3)
MCH RBC QN AUTO: 29.6 PG (ref 26.6–33)
MCHC RBC AUTO-ENTMCNC: 31.4 G/DL (ref 31.5–35.7)
MCV RBC AUTO: 94 FL (ref 79–97)
MONOCYTES # BLD AUTO: 0.83 10*3/MM3 (ref 0.1–0.9)
MONOCYTES NFR BLD AUTO: 8.8 % (ref 5–12)
NEUTROPHILS # BLD AUTO: 6.25 10*3/MM3 (ref 1.7–7)
NEUTROPHILS NFR BLD AUTO: 66 % (ref 42.7–76)
NITRITE UR QL STRIP: NEGATIVE
PH UR STRIP.AUTO: <=5 [PH] (ref 5–8)
PLATELET # BLD AUTO: 242 10*3/MM3 (ref 140–450)
PMV BLD AUTO: 9.6 FL (ref 6–12)
POTASSIUM BLD-SCNC: 4.1 MMOL/L (ref 3.5–5.2)
PROT SERPL-MCNC: 7.1 G/DL (ref 6–8.5)
PROT UR QL STRIP: ABNORMAL
RBC # BLD AUTO: 4.33 10*6/MM3 (ref 3.77–5.28)
RBC # UR: ABNORMAL /HPF
REF LAB TEST METHOD: ABNORMAL
SODIUM BLD-SCNC: 142 MMOL/L (ref 136–145)
SP GR UR STRIP: 1.02 (ref 1–1.03)
SQUAMOUS #/AREA URNS HPF: ABNORMAL /HPF
TROPONIN T SERPL-MCNC: <0.01 NG/ML (ref 0–0.03)
TSH SERPL DL<=0.05 MIU/L-ACNC: 2.41 MIU/ML (ref 0.27–4.2)
UROBILINOGEN UR QL STRIP: ABNORMAL
VIT B12 BLD-MCNC: 473 PG/ML (ref 211–946)
WBC NRBC COR # BLD: 9.46 10*3/MM3 (ref 3.4–10.8)
WBC UR QL AUTO: ABNORMAL /HPF

## 2019-08-04 PROCEDURE — 99223 1ST HOSP IP/OBS HIGH 75: CPT | Performed by: INTERNAL MEDICINE

## 2019-08-04 PROCEDURE — 70450 CT HEAD/BRAIN W/O DYE: CPT

## 2019-08-04 PROCEDURE — 94799 UNLISTED PULMONARY SVC/PX: CPT

## 2019-08-04 PROCEDURE — 82607 VITAMIN B-12: CPT | Performed by: INTERNAL MEDICINE

## 2019-08-04 PROCEDURE — 84443 ASSAY THYROID STIM HORMONE: CPT | Performed by: INTERNAL MEDICINE

## 2019-08-04 PROCEDURE — 82746 ASSAY OF FOLIC ACID SERUM: CPT | Performed by: INTERNAL MEDICINE

## 2019-08-04 PROCEDURE — 25010000002 HEPARIN (PORCINE) PER 1000 UNITS: Performed by: INTERNAL MEDICINE

## 2019-08-04 PROCEDURE — 85025 COMPLETE CBC W/AUTO DIFF WBC: CPT | Performed by: INTERNAL MEDICINE

## 2019-08-04 PROCEDURE — 81001 URINALYSIS AUTO W/SCOPE: CPT | Performed by: NURSE PRACTITIONER

## 2019-08-04 PROCEDURE — 99221 1ST HOSP IP/OBS SF/LOW 40: CPT | Performed by: PSYCHIATRY & NEUROLOGY

## 2019-08-04 PROCEDURE — 70450 CT HEAD/BRAIN W/O DYE: CPT | Performed by: RADIOLOGY

## 2019-08-04 PROCEDURE — 80053 COMPREHEN METABOLIC PANEL: CPT | Performed by: INTERNAL MEDICINE

## 2019-08-04 PROCEDURE — 84484 ASSAY OF TROPONIN QUANT: CPT | Performed by: INTERNAL MEDICINE

## 2019-08-04 RX ORDER — SODIUM CHLORIDE 0.9 % (FLUSH) 0.9 %
3-10 SYRINGE (ML) INJECTION AS NEEDED
Status: DISCONTINUED | OUTPATIENT
Start: 2019-08-04 | End: 2019-08-08 | Stop reason: HOSPADM

## 2019-08-04 RX ORDER — SODIUM CHLORIDE 0.9 % (FLUSH) 0.9 %
3 SYRINGE (ML) INJECTION EVERY 12 HOURS SCHEDULED
Status: DISCONTINUED | OUTPATIENT
Start: 2019-08-04 | End: 2019-08-08 | Stop reason: HOSPADM

## 2019-08-04 RX ORDER — NITROGLYCERIN 0.4 MG/1
0.4 TABLET SUBLINGUAL
Status: DISCONTINUED | OUTPATIENT
Start: 2019-08-04 | End: 2019-08-08 | Stop reason: HOSPADM

## 2019-08-04 RX ORDER — SODIUM CHLORIDE 9 MG/ML
50 INJECTION, SOLUTION INTRAVENOUS CONTINUOUS
Status: DISCONTINUED | OUTPATIENT
Start: 2019-08-04 | End: 2019-08-05

## 2019-08-04 RX ORDER — AMLODIPINE BESYLATE 5 MG/1
5 TABLET ORAL
Status: DISCONTINUED | OUTPATIENT
Start: 2019-08-04 | End: 2019-08-08 | Stop reason: HOSPADM

## 2019-08-04 RX ORDER — HEPARIN SODIUM 5000 [USP'U]/ML
5000 INJECTION, SOLUTION INTRAVENOUS; SUBCUTANEOUS EVERY 12 HOURS SCHEDULED
Status: DISCONTINUED | OUTPATIENT
Start: 2019-08-04 | End: 2019-08-08 | Stop reason: HOSPADM

## 2019-08-04 RX ORDER — CHOLECALCIFEROL (VITAMIN D3) 125 MCG
2.5 CAPSULE ORAL NIGHTLY PRN
Status: DISCONTINUED | OUTPATIENT
Start: 2019-08-04 | End: 2019-08-06

## 2019-08-04 RX ADMIN — SODIUM CHLORIDE, PRESERVATIVE FREE 3 ML: 5 INJECTION INTRAVENOUS at 02:16

## 2019-08-04 RX ADMIN — AMLODIPINE BESYLATE 5 MG: 5 TABLET ORAL at 16:24

## 2019-08-04 RX ADMIN — SODIUM CHLORIDE, PRESERVATIVE FREE 3 ML: 5 INJECTION INTRAVENOUS at 20:01

## 2019-08-04 RX ADMIN — HEPARIN SODIUM 5000 UNITS: 5000 INJECTION INTRAVENOUS; SUBCUTANEOUS at 20:01

## 2019-08-04 RX ADMIN — SODIUM CHLORIDE 50 ML/HR: 9 INJECTION, SOLUTION INTRAVENOUS at 08:28

## 2019-08-04 RX ADMIN — HEPARIN SODIUM 5000 UNITS: 5000 INJECTION INTRAVENOUS; SUBCUTANEOUS at 02:23

## 2019-08-04 RX ADMIN — HEPARIN SODIUM 5000 UNITS: 5000 INJECTION INTRAVENOUS; SUBCUTANEOUS at 08:32

## 2019-08-04 RX ADMIN — SODIUM CHLORIDE 50 ML/HR: 9 INJECTION, SOLUTION INTRAVENOUS at 02:23

## 2019-08-04 NOTE — PLAN OF CARE
Problem: Patient Care Overview  Goal: Plan of Care Review  Outcome: Ongoing (interventions implemented as appropriate)    Goal: Individualization and Mutuality  Outcome: Ongoing (interventions implemented as appropriate)    Goal: Discharge Needs Assessment  Outcome: Ongoing (interventions implemented as appropriate)      Problem: Fall Risk (Adult)  Goal: Identify Related Risk Factors and Signs and Symptoms  Outcome: Ongoing (interventions implemented as appropriate)    Goal: Absence of Fall  Outcome: Ongoing (interventions implemented as appropriate)      Problem: Hypertensive Disease/Crisis (Arterial) (Adult)  Goal: Signs and Symptoms of Listed Potential Problems Will be Absent, Minimized or Managed (Hypertensive Disease/Crisis)  Outcome: Ongoing (interventions implemented as appropriate)      Problem: Skin Injury Risk (Adult)  Goal: Identify Related Risk Factors and Signs and Symptoms  Outcome: Ongoing (interventions implemented as appropriate)    Goal: Skin Health and Integrity  Outcome: Ongoing (interventions implemented as appropriate)

## 2019-08-04 NOTE — H&P
"Hospitalist History and Physical    Patient Identification:  Name: Lolis Infante  Age/Sex: 89 y.o. female  :  1929  MRN: [id number]         Primary Care Physician: Provider, No Known    Chief Complaint   Patient presents with   • Cough   • Back Pain       History of Present Illness  Patient is a 89 y.o. female presents with the following: falls, intermittent confusion    The patient is a 89 yo female with no past medical history who presents to the emergency department with family due to reported cough, congestion and falls.    The patient lives alone but has siblings who check in on her.  The patient's sister at her present at bedside state that their brother reported that the patient had been having some cough and congestion with \"rattling\" in her chest for approximately 2 days.  Patient apparently had a fall 2 days ago as well and has been unsteady on her feet per her sister's report.  The patient has also had decreased appetite.  Her sister states that the patient's brother will make her 1 pot of coffee per day and that she drinks \"hungry man meals.\"    The patient sister states that at times the patient has difficulty with memory and appears confused.  She apparently though has refused to move in with any family members and refuses to allow anyone to stay with her.  The patient does not have children.    The patient is awake and alert.  She is unable to provide history and claims that the reason for this is because she is \"tired.\"  She is oriented to self and has to be reminded of place.    In the emergency department, the patient's blood pressure was initially significantly elevated maximum blood pressure 196/117.  Her proBNP was 1929.  Chest x-ray revealed mild cardiomegaly.  CBC was unremarkable and CMP revealed a BUN of 38 with creatinine of 1.44 and a BUN to creatinine ratio of 26.4.  The patient has been admitted to the hospital service for further evaluation.    Present during visit: ROB Vance " and the patient's 2 sisters    Past History:  History reviewed. No pertinent past medical history.  History reviewed. No pertinent surgical history.  Family History   Family history unknown: Yes     Social History     Tobacco Use   • Smoking status: Never Smoker   Substance Use Topics   • Alcohol use: No   • Drug use: No     No medications prior to admission.     Allergies: Patient has no known allergies.    Review of Systems:  Review of Systems   Respiratory: Positive for cough and shortness of breath.    Psychiatric/Behavioral: Positive for confusion.     Difficult to assess due to confusion/mental status change    Vital Signs  Temp:  [96.9 °F (36.1 °C)-98.2 °F (36.8 °C)] 96.9 °F (36.1 °C)  Heart Rate:  [79-85] 79  Resp:  [16-20] 16  BP: (123-196)/() 137/72  Body mass index is 16.09 kg/m².    Physical Exam:  Physical Exam   Constitutional: She appears well-developed and well-nourished. She appears cachectic. No distress.   Chronically ill appearing.   HENT:   Head: Normocephalic and atraumatic.   Mouth/Throat: Oropharynx is clear and moist. Dental caries (Poor dentition.) present.   Eyes: Conjunctivae and EOM are normal. Pupils are equal, round, and reactive to light.   Neck: Neck supple. No tracheal deviation present. No thyromegaly present.   Cardiovascular: Normal rate and regular rhythm. Exam reveals no gallop and no friction rub.   No murmur heard.  Pulmonary/Chest: Breath sounds normal. No respiratory distress. She has no wheezes. She has no rales.   Abdominal: Soft. Bowel sounds are normal. She exhibits no distension. There is no tenderness. There is no guarding.   Musculoskeletal: Normal range of motion. She exhibits no tenderness.   Neurological: She is alert.   Follows some commands; equal strength B/L LE 3/5.   Skin: Skin is warm and dry. No rash noted. No erythema.   Psychiatric: She has a normal mood and affect.      Results Review:    Results from last 7 days   Lab Units 08/03/19 1923   PH,  ARTERIAL pH units 7.421   PO2 ART mm Hg 76.7*   PCO2, ARTERIAL mm Hg 33.4*   HCO3 ART mmol/L 21.2*       Results from last 7 days   Lab Units 08/03/19  1909   WBC 10*3/mm3 7.17   HEMOGLOBIN g/dL 13.4   HEMATOCRIT % 42.1   PLATELETS 10*3/mm3 233     Results from last 7 days   Lab Units 08/03/19  1909   SODIUM mmol/L 143   POTASSIUM mmol/L 4.3   CHLORIDE mmol/L 102   CO2 mmol/L 23.5   BUN mg/dL 38*   CREATININE mg/dL 1.44*   CALCIUM mg/dL 9.5   GLUCOSE mg/dL 95     Results from last 7 days   Lab Units 08/03/19  1909   BILIRUBIN mg/dL 0.4   ALK PHOS U/L 84   AST (SGOT) U/L 26   ALT (SGPT) U/L 18     Results from last 7 days   Lab Units 08/03/19  1929   CRP mg/dL 0.28         Results from last 7 days   Lab Units 08/03/19  1909   TROPONIN T ng/mL <0.010         Imaging:    I have personally reviewed the EKG. Pending official cardiology interpretation, however, per my view: NSR with LVH; non-specific ST-T changes inferolaterally; Q waves laterally with a QTc 457 ms    Imaging Results (most recent)     Procedure Component Value Units Date/Time    XR Chest AP [[id number]] Collected:  08/03/19 2015     Updated:  08/03/19 2017    Narrative:       CR Chest 1 Vw    INDICATION:   9-year-old female with shortness of air and cough today.     COMPARISON:    Chest x-ray 2/21/2019  CT chest 2/21/2019    FINDINGS:  Single portable AP view of the chest.  Stable mild cardiomegaly. Mediastinal contours are normal. The lungs are clear. No pneumothorax or pleural effusion. Advanced right glenohumeral arthrosis. Old, healed right-sided rib fracture.      Impression:       Stable mild cardiomegaly. No other acute chest findings.    Signer Name: Jose A Hilario MD   Signed: 8/3/2019 8:15 PM   Workstation Name: [email or web address]    [facility]          Assessment/Plan     -Possible acute hypertensive emergency, improved after clonidine and hydralazine: Patient has been admitted to the telemetry unit. Will monitor for now and  allow for permissive hypertension.     -Pre-renal azotemia, mild: Will gently hydrate x 12 hours.     -Elevated anion gap: Repeat chemistries in am after IV fluid hydration.     -Elevated pro-BNP with cardiomegaly: Obtain echocardiogram. Clinically does not appear in acute heart failure.     -Malnutrition, suspect chronic: Consult nutrition.     -Intermittent confusion, dementia versus age related cognitive decline: Obtain TSH, vitamin B12 and folate levels.  Consult physical therapy.   has been consulted for discharge planning.  I discussed with the patient's sisters and encouraged them to discuss amongst themselves and their other siblings regarding applying for patient's power of /decision-maker. It does not appear that patient can be discharged to live with one of her siblings. Family may want nursing home placement. Patient will likely require a psychiatry consultation to determine if she is competent to make medical decisions.    DVT prophylaxis: Subcutaneous heparin    Estimated Length of Stay: > 2 MNs    CODE: FULL/CPR    I discussed the patients findings and my recommendations with family and nursing staff      Lynda Narvaez DO  08/04/19  12:35 AM

## 2019-08-04 NOTE — ED NOTES
Attempted straight cath for UA unable to obtain urine at this time. Provider made aware.     Nichol Chopra, ROB  08/03/19 2026

## 2019-08-04 NOTE — PROGRESS NOTES
[facility] HOSPITALIST PROGRESS NOTE     Patient Identification:  Name:  Lolis Infante  Age:  89 y.o.  Sex:  female  :  1929  MRN:  [id number]  Visit Number:  [id number]  ROOM: [address] [place] [address]     Primary Care Provider:  Provider, No Known    Length of stay in inpatient status:  1    Subjective     Chief Compliant:    Chief Complaint   Patient presents with   • Cough   • Back Pain       History of Presenting Illness: 89-year-old female who was brought to the emergency department yesterday evening secondary to recent confusion with falls at home.  Patient had decreased appetite.  Patient currently lives at home by herself.  Patient was found to have severe hypertension and acute kidney injury.  Patient was admitted to the hospital given gentle hydration and her blood pressures been better become better controlled overnight.  Patient does have some decreased auditory acuity area family would like nursing home placement for patient, cording to H&P.  Family is not available this morning.    Objective     Current Hospital Meds:  heparin (porcine) 5,000 Units Subcutaneous Q12H   sodium chloride 3 mL Intravenous Q12H     sodium chloride 50 mL/hr Last Rate: 50 mL/hr (19 0828)     ----------------------------------------------------------------------------------------------------------------------  Vital Signs:  Temp:  [96.9 °F (36.1 °C)-98.2 °F (36.8 °C)] 97.2 °F (36.2 °C)  Heart Rate:  [68-85] 68  Resp:  [16-20] 18  BP: (123-196)/() 169/79  SpO2:  [94 %-98 %] 97 %  on   ;   Device (Oxygen Therapy): room air  Body mass index is 16.09 kg/m².    Wt Readings from Last 3 Encounters:   19 37.4 kg (82 lb 6.4 oz)   02/21/19 45.4 kg (100 lb)   07/12/16 47.6 kg (105 lb)     Intake & Output (last 3 days)       701 -  -  -  -  07    P.O.   100     IV Piggyback   250     Total Intake(mL/kg)   350 (9.4)     Urine (mL/kg/hr)    200     Total Output   200     Net   +150                 Diet Soft Texture; Chopped  ----------------------------------------------------------------------------------------------------------------------  Physical exam:  Constitutional: Frail malnourished appearing elderly female in no distress  HEENT: Normal cephalic atraumatic  Neck:   Supple  Cardiovascular: Regular rate and rhythm  Pulmonary/Chest: Clear to auscultation  Abdominal: Positive bowel sounds soft.   Musculoskeletal: No obvious arthropathy  Neurological: He is oriented to person.  Slightly confused to time and place.  No focal deficits  Skin: No rashes  Peripheral vascular:  Genitourinary:  ----------------------------------------------------------------------------------------------------------------------    Last echocardiogram:     ----------------------------------------------------------------------------------------------------------------------  Results from last 7 days   Lab Units 08/04/19  0109 08/03/19  1929 08/03/19  1909   CRP mg/dL  --  0.28  --    LACTATE mmol/L  --  1.4  --    WBC 10*3/mm3 9.46  --  7.17   HEMOGLOBIN g/dL 12.8  --  13.4   HEMATOCRIT % 40.7  --  42.1   MCV fL 94.0  --  93.1   MCHC g/dL 31.4*  --  31.8   PLATELETS 10*3/mm3 242  --  233     Results from last 7 days   Lab Units 08/03/19  1923   PH, ARTERIAL pH units 7.421   PO2 ART mm Hg 76.7*   PCO2, ARTERIAL mm Hg 33.4*   HCO3 ART mmol/L 21.2*     Results from last 7 days   Lab Units 08/04/19  0109 08/03/19  1909   SODIUM mmol/L 142 143   POTASSIUM mmol/L 4.1 4.3   CHLORIDE mmol/L 106 102   CO2 mmol/L 21.6* 23.5   BUN mg/dL 36* 38*   CREATININE mg/dL 1.29* 1.44*   EGFR IF NONAFRICN AM mL/min/1.73 39* 34*   CALCIUM mg/dL 9.3 9.5   GLUCOSE mg/dL 94 95   ALBUMIN g/dL 3.47* 3.89   BILIRUBIN mg/dL 0.4 0.4   ALK PHOS U/L 77 84   AST (SGOT) U/L 23 26   ALT (SGPT) U/L 16 18   Estimated Creatinine Clearance: 17.1 mL/min (A) (by C-G formula based on SCr of 1.29 mg/dL (H)).  No  results found for: AMMONIA  Results from last 7 days   Lab Units 08/04/19  0554 08/04/19  0109 08/03/19  1909   TROPONIN T ng/mL <0.010 <0.010 <0.010     Results from last 7 days   Lab Units 08/03/19  1909   PROBNP pg/mL 1,929.0*         No results found for: HGBA1C, POCGLU  Lab Results   Component Value Date    TSH 2.410 08/04/2019     No results found for: PREGTESTUR, PREGSERUM, HCG, HCGQUANT  Pain Management Panel     There is no flowsheet data to display.        Brief Urine Lab Results  (Last result in the past 365 days)      Color   Clarity   Blood   Leuk Est   Nitrite   Protein   CREAT   Urine HCG        08/04/19 0619 Yellow Clear Negative Trace Negative Trace                Results from last 7 days   Lab Units 08/04/19  0619   NITRITE UA  Negative   WBC UA /HPF 6-12*   BACTERIA UA /HPF None Seen   SQUAM EPITHEL UA /HPF 0-2              Results from last 7 days   Lab Units 08/03/19  1929   LACTATE mmol/L 1.4   CRP mg/dL 0.28       I have personally looked at the labs and they are summarized above.  ----------------------------------------------------------------------------------------------------------------------  Detailed radiology reports for the last 24 hours:    Imaging Results (last 24 hours)     Procedure Component Value Units Date/Time    XR Chest AP [[id number]] Collected:  08/03/19 2015     Updated:  08/03/19 2017    Narrative:       CR Chest 1 Vw    INDICATION:   9-year-old female with shortness of air and cough today.     COMPARISON:    Chest x-ray 2/21/2019  CT chest 2/21/2019    FINDINGS:  Single portable AP view of the chest.  Stable mild cardiomegaly. Mediastinal contours are normal. The lungs are clear. No pneumothorax or pleural effusion. Advanced right glenohumeral arthrosis. Old, healed right-sided rib fracture.      Impression:       Stable mild cardiomegaly. No other acute chest findings.    Signer Name: Jose A Hilario MD   Signed: 8/3/2019 8:15 PM   Workstation Name: [email or web address]     [facility]        Final impressions for the last 30 days of radiology reports:    Xr Chest Ap    Result Date: 8/3/2019  Stable mild cardiomegaly. No other acute chest findings. Signer Name: Jose A Hilario MD  Signed: 8/3/2019 8:15 PM  Workstation Name: [email or web address]-  [facility]    I have personally looked at the radiology images and read the final radiology report.    Assessment & Plan    Severe hypertension--much improved    Acute kidney injury--improved with gentle hydration    Intermittent confusion--suspect underlying dementia process.  Will obtain psychiatric consultation for help with determination of competency to make own medical decisions    Malnutrition--nutrition consult pending    Gait instability at home--PT consult    Possible nursing home placement-- consultation.  Again will await psych evaluation for evaluation of competency to make own decisions.    VTE Prophylaxis:   Mechanical Order History:     None      Pharmalogical Order History:     Ordered     Dose Route Frequency Stop    08/04/19 0042  heparin (porcine) 5000 UNIT/ML injection 5,000 Units      5,000 Units SC Every 12 Hours Scheduled --              Seth Vargas MD  [facility]  08/04/19  11:22 AM

## 2019-08-04 NOTE — CONSULTS
Referring Provider: Dr. Seth Vargas  Reason for Consultation: Decision-making capacity      Chief complaint/Focus of Exam: Confusion    Subjective .     History of present illness: The patient is a 89-year-old female who was brought to the hospital due to recent confusion and falls at home.  Psychiatric consult is done to evaluate her for decision making capacity.  The patient was seen in her room where she is lying comfortably in her bed.  She is hard of hearing.  All she is oriented to his person.  She does not know where she is, she does not know today's date, and she does not know why she is here.  She is not able to provide a coherent history and tends to confabulate. Noted patient's sister's report in H&P that has had episodes of confusion and memory difficulties.    Past Psych History: Pt is not able to provide any details.      Review of Systems  Pt is not able to provide a coherent ROS of systems, though she denied any pain or discomfort.    History  History reviewed. No pertinent past medical history., History reviewed. No pertinent surgical history.,   Family History   Family history unknown: Yes   ,   Social History     Tobacco Use   • Smoking status: Never Smoker   Substance Use Topics   • Alcohol use: No   • Drug use: No   ,   No medications prior to admission.   , Scheduled Meds:    heparin (porcine) 5,000 Units Subcutaneous Q12H   sodium chloride 3 mL Intravenous Q12H   , Continuous Infusions:    sodium chloride 50 mL/hr Last Rate: 50 mL/hr (08/04/19 0828)   , PRN Meds:  melatonin  •  nitroglycerin  •  sodium chloride  •  [COMPLETED] Insert peripheral IV **AND** sodium chloride  •  sodium chloride and Allergies:  Patient has no known allergies.    Objective     Vital Signs   Temp:  [96.9 °F (36.1 °C)-98.3 °F (36.8 °C)] 98.3 °F (36.8 °C)  Heart Rate:  [68-86] 86  Resp:  [16-20] 18  BP: (123-196)/() 181/80    Mental Status Exam:   Mental Status Exam:    Hygiene:   fair  Cooperation:   Cooperative  Eye Contact:  Fair  Psychomotor Behavior:  Appropriate  Affect:  Restricted  Hopelessness: Denies  Speech:  Minimal  Thought Progress:  Disorganized  Thought Content:  Unable to demonstrate  Suicidal:  None  Homicidal:  None  Hallucinations:  None  Delusion:  Unable to demonstrate  Memory:  Unable to evaluate  Orientation:  Person  Reliability:  poor  Insight:  Poor  Judgement:  Poor  Impulse Control:  Fair    Results Review:   I reviewed the patient's new clinical results.  Lab Results (last 24 hours)     Procedure Component Value Units Date/Time    Troponin [[id number]]  (Normal) Collected:  08/04/19 1156    Specimen:  Blood Updated:  08/04/19 1232     Troponin T <0.010 ng/mL     Narrative:       Troponin T Reference Range:  <= 0.03 ng/mL-   Negative for AMI  >0.03 ng/mL-     Abnormal for myocardial necrosis.  Clinicians would have to utilize clinical acumen, EKG, Troponin and serial changes to determine if it is an Acute Myocardial Infarction or myocardial injury due to an underlying chronic condition.     Vitamin B12 [[id number]]  (Normal) Collected:  08/04/19 0554    Specimen:  Blood Updated:  08/04/19 1225     Vitamin B-12 473 pg/mL     Folate [[id number]]  (Normal) Collected:  08/04/19 0554    Specimen:  Blood Updated:  08/04/19 1225     Folate 16.50 ng/mL     Urinalysis With Microscopic If Indicated (No Culture) - Urine, Clean Catch [[id number]]  (Abnormal) Collected:  08/04/19 0619    Specimen:  Urine, Clean Catch Updated:  08/04/19 0633     Color, UA Yellow     Appearance, UA Clear     pH, UA <=5.0     Specific Gravity, UA 1.018     Glucose, UA Negative     Ketones, UA 40 mg/dL (2+)     Bilirubin, UA Negative     Blood, UA Negative     Protein, UA Trace     Leuk Esterase, UA Trace     Nitrite, UA Negative     Urobilinogen, UA 1.0 E.U./dL    Urinalysis, Microscopic Only - Urine, Clean Catch [[id number]]  (Abnormal) Collected:  08/04/19 0619    Specimen:  Urine, Clean Catch Updated:  08/04/19 0633      RBC, UA 0-2 /HPF      WBC, UA 6-12 /HPF      Bacteria, UA None Seen /HPF      Squamous Epithelial Cells, UA 0-2 /HPF      Hyaline Casts, UA 3-6 /LPF      Methodology Automated Microscopy    Troponin [[id number]]  (Normal) Collected:  08/04/19 0554    Specimen:  Blood Updated:  08/04/19 0630     Troponin T <0.010 ng/mL     Narrative:       Troponin T Reference Range:  <= 0.03 ng/mL-   Negative for AMI  >0.03 ng/mL-     Abnormal for myocardial necrosis.  Clinicians would have to utilize clinical acumen, EKG, Troponin and serial changes to determine if it is an Acute Myocardial Infarction or myocardial injury due to an underlying chronic condition.     TSH [[id number]]  (Normal) Collected:  08/04/19 0554    Specimen:  Blood Updated:  08/04/19 0630     TSH 2.410 mIU/mL     Comprehensive Metabolic Panel [[id number]]  (Abnormal) Collected:  08/04/19 0109    Specimen:  Blood Updated:  08/04/19 0151     Glucose 94 mg/dL      BUN 36 mg/dL      Creatinine 1.29 mg/dL      Sodium 142 mmol/L      Potassium 4.1 mmol/L      Chloride 106 mmol/L      CO2 21.6 mmol/L      Calcium 9.3 mg/dL      Total Protein 7.1 g/dL      Albumin 3.47 g/dL      ALT (SGPT) 16 U/L      AST (SGOT) 23 U/L      Alkaline Phosphatase 77 U/L      Total Bilirubin 0.4 mg/dL      eGFR Non African Amer 39 mL/min/1.73      Globulin 3.6 gm/dL      A/G Ratio 1.0 g/dL      BUN/Creatinine Ratio 27.9     Anion Gap 14.4 mmol/L     Narrative:       GFR Normal >60  Chronic Kidney Disease <60  Kidney Failure <15    Troponin [[id number]]  (Normal) Collected:  08/04/19 0109    Specimen:  Blood Updated:  08/04/19 0151     Troponin T <0.010 ng/mL     Narrative:       Troponin T Reference Range:  <= 0.03 ng/mL-   Negative for AMI  >0.03 ng/mL-     Abnormal for myocardial necrosis.  Clinicians would have to utilize clinical acumen, EKG, Troponin and serial changes to determine if it is an Acute Myocardial Infarction or myocardial injury due to an underlying chronic condition.      CBC & Differential [[id number]] Collected:  08/04/19 0109    Specimen:  Blood Updated:  08/04/19 0124    Narrative:       The following orders were created for panel order CBC & Differential.  Procedure                               Abnormality         Status                     ---------                               -----------         ------                     CBC Auto Differential[[id number]]        Abnormal            Final result                 Please view results for these tests on the individual orders.    CBC Auto Differential [[id number]]  (Abnormal) Collected:  08/04/19 0109    Specimen:  Blood Updated:  08/04/19 0124     WBC 9.46 10*3/mm3      RBC 4.33 10*6/mm3      Hemoglobin 12.8 g/dL      Hematocrit 40.7 %      MCV 94.0 fL      MCH 29.6 pg      MCHC 31.4 g/dL      RDW 15.0 %      RDW-SD 49.4 fl      MPV 9.6 fL      Platelets 242 10*3/mm3      Neutrophil % 66.0 %      Lymphocyte % 16.1 %      Monocyte % 8.8 %      Eosinophil % 8.8 %      Basophil % 0.2 %      Immature Grans % 0.1 %      Neutrophils, Absolute 6.25 10*3/mm3      Lymphocytes, Absolute 1.52 10*3/mm3      Monocytes, Absolute 0.83 10*3/mm3      Eosinophils, Absolute 0.83 10*3/mm3      Basophils, Absolute 0.02 10*3/mm3      Immature Grans, Absolute 0.01 10*3/mm3     [place] Draw [[id number]] Collected:  08/03/19 1909    Specimen:  Blood Updated:  08/03/19 2015    Narrative:       The following orders were created for panel order [place] Draw.  Procedure                               Abnormality         Status                     ---------                               -----------         ------                     Light Blue Top[[id number]]                                   Final result               Green Top (Gel)[[id number]]                                  Final result               Lavender Top[[id number]]                                     Final result               Gold Top - SST[[id number]]                                   Final result                  Please view results for these tests on the individual orders.    Light Blue Top [[id number]] Collected:  08/03/19 1909    Specimen:  Blood Updated:  08/03/19 2015     Extra Tube hold for add-on     Comment: Auto resulted       Green Top (Gel) [[id number]] Collected:  08/03/19 1909    Specimen:  Blood Updated:  08/03/19 2015     Extra Tube Hold for add-ons.     Comment: Auto resulted.       Gold Top - SST [[id number]] Collected:  08/03/19 1909    Specimen:  Blood Updated:  08/03/19 2015     Extra Tube Hold for add-ons.     Comment: Auto resulted.       Lavender Top [[id number]] Collected:  08/03/19 1909    Specimen:  Blood Updated:  08/03/19 2015     Extra Tube hold for add-on     Comment: Auto resulted       C-reactive Protein [[id number]]  (Normal) Collected:  08/03/19 1929    Specimen:  Blood Updated:  08/03/19 2000     C-Reactive Protein 0.28 mg/dL     Lactic Acid, Plasma [[id number]]  (Normal) Collected:  08/03/19 1929    Specimen:  Blood Updated:  08/03/19 1957     Lactate 1.4 mmol/L     Comprehensive Metabolic Panel [[id number]]  (Abnormal) Collected:  08/03/19 1909    Specimen:  Blood Updated:  08/03/19 1939     Glucose 95 mg/dL      BUN 38 mg/dL      Creatinine 1.44 mg/dL      Sodium 143 mmol/L      Potassium 4.3 mmol/L      Chloride 102 mmol/L      CO2 23.5 mmol/L      Calcium 9.5 mg/dL      Total Protein 7.6 g/dL      Albumin 3.89 g/dL      ALT (SGPT) 18 U/L      AST (SGOT) 26 U/L      Alkaline Phosphatase 84 U/L      Total Bilirubin 0.4 mg/dL      eGFR Non African Amer 34 mL/min/1.73      Globulin 3.7 gm/dL      A/G Ratio 1.0 g/dL      BUN/Creatinine Ratio 26.4     Anion Gap 17.5 mmol/L     Narrative:       GFR Normal >60  Chronic Kidney Disease <60  Kidney Failure <15    Troponin [[id number]]  (Normal) Collected:  08/03/19 1909    Specimen:  Blood Updated:  08/03/19 1939     Troponin T <0.010 ng/mL     Narrative:       Troponin T Reference Range:  <= 0.03 ng/mL-   Negative for AMI  >0.03 ng/mL-      Abnormal for myocardial necrosis.  Clinicians would have to utilize clinical acumen, EKG, Troponin and serial changes to determine if it is an Acute Myocardial Infarction or myocardial injury due to an underlying chronic condition.     BNP [[id number]]  (Abnormal) Collected:  08/03/19 1909    Specimen:  Blood Updated:  08/03/19 1937     proBNP 1,929.0 pg/mL     Narrative:       Among patients with dyspnea, NT-proBNP is highly sensitive for the detection of acute congestive heart failure. In addition NT-proBNP of <300 pg/ml effectively rules out acute congestive heart failure with 99% negative predictive value.    Blood Culture - Blood, Arm, Right [[id number]] Collected:  08/03/19 1928    Specimen:  Blood from Arm, Right Updated:  08/03/19 1935    Blood Culture - Blood, Arm, Left [[id number]] Collected:  08/03/19 1931    Specimen:  Blood from Arm, Left Updated:  08/03/19 1935    Blood Gas, Arterial [[id number]]  (Abnormal) Collected:  08/03/19 1923    Specimen:  Arterial Blood Updated:  08/03/19 1925     Site Arterial: right brachial     Umang's Test N/A     pH, Arterial 7.421 pH units      pCO2, Arterial 33.4 mm Hg      pO2, Arterial 76.7 mm Hg      HCO3, Arterial 21.2 mmol/L      Base Excess, Arterial -2.4 mmol/L      O2 Saturation, Arterial 95.2 %      Hemoglobin, Blood Gas 13.5 g/dL      Hematocrit, Blood Gas 40.0 %      Oxyhemoglobin 93.6 %      Methemoglobin 0.20 %      Carboxyhemoglobin 1.5 %      A-a Gradiant 26.5 mmHg      Temperature 98.6 C      Barometric Pressure for Blood Gas 729 mmHg      Modality Room Air     FIO2 21 %     CBC & Differential [[id number]] Collected:  08/03/19 1909    Specimen:  Blood Updated:  08/03/19 1919    Narrative:       The following orders were created for panel order CBC & Differential.  Procedure                               Abnormality         Status                     ---------                               -----------         ------                     CBC Auto  Differential[[id number]]        Abnormal            Final result                 Please view results for these tests on the individual orders.    CBC Auto Differential [[id number]]  (Abnormal) Collected:  08/03/19 1909    Specimen:  Blood Updated:  08/03/19 1919     WBC 7.17 10*3/mm3      RBC 4.52 10*6/mm3      Hemoglobin 13.4 g/dL      Hematocrit 42.1 %      MCV 93.1 fL      MCH 29.6 pg      MCHC 31.8 g/dL      RDW 15.0 %      RDW-SD 49.2 fl      MPV 9.3 fL      Platelets 233 10*3/mm3      Neutrophil % 68.4 %      Lymphocyte % 19.4 %      Monocyte % 5.9 %      Eosinophil % 6.1 %      Basophil % 0.1 %      Immature Grans % 0.1 %      Neutrophils, Absolute 4.90 10*3/mm3      Lymphocytes, Absolute 1.39 10*3/mm3      Monocytes, Absolute 0.42 10*3/mm3      Eosinophils, Absolute 0.44 10*3/mm3      Basophils, Absolute 0.01 10*3/mm3      Immature Grans, Absolute 0.01 10*3/mm3         Imaging Results (last 24 hours)     Procedure Component Value Units Date/Time    CT Head Without Contrast [[id number]] Collected:  08/04/19 1243     Updated:  08/04/19 1247    Narrative:       EXAMINATION: CT HEAD WO CONTRAST-      CLINICAL INDICATION:     intermittent confusion; I16.1-Hypertensive  emergency; E86.0-Dehydration; I50.9-Heart failure, unspecified     COMPARISON:    02/21/2019     Technique: Multiple CT axial images were obtained through the level of  the brain without IV contrast administration. Reformatted images in the  coronal and/or sagittal plane(s) were generated from the axial data set  to facilitate diagnostic accuracy and/or surgical planning.     Radiation dose reduction techniques were utilized per ALARA protocol.  Automated exposure control was initiated through either or CareDose or  DoseRight software packages by  protocol.       DOSE (DLP mGy-cm):     FINDINGS:     Brain: Unremarkable. No parenchymal hemorrhage or mass. No white matter  abnormality. No areas of mass effect. Mild generalized cerebral  atrophy  and moderate-advanced chronic small vessel ischemic disease.  Ventricles: Unremarkable. No hydrocephalus.  Extra-axial spaces: Unremarkable. No extra-axial hemorrhage. No  extra-axial masses.  Bones: Unremarkable. No acute fracture identified.  Sinuses: Mild chronic ethmoid sinus disease.  Mastoids: Unremarkable. No mastoid effusions.  Soft Tissues: Unremarkable.          Impression:       1. No CT evidence of acute intracranial abnormality.  2. Stable atrophy and moderate-advanced chronic small vessel ischemic  disease.  3. Mild chronic ethmoid sinus disease.     This report was finalized on 8/4/2019 12:45 PM by Dr. Neno Verdugo MD.       XR Chest AP [[id number]] Collected:  08/03/19 2015     Updated:  08/03/19 2017    Narrative:       CR Chest 1 Vw    INDICATION:   9-year-old female with shortness of air and cough today.     COMPARISON:    Chest x-ray 2/21/2019  CT chest 2/21/2019    FINDINGS:  Single portable AP view of the chest.  Stable mild cardiomegaly. Mediastinal contours are normal. The lungs are clear. No pneumothorax or pleural effusion. Advanced right glenohumeral arthrosis. Old, healed right-sided rib fracture.      Impression:       Stable mild cardiomegaly. No other acute chest findings.    Signer Name: Jose A Hilario MD   Signed: 8/3/2019 8:15 PM   Workstation Name: [email or web address]-    [facility]            Assessment/Plan     Active Problems:    Hypertensive emergency     Dementia  The patient lacks capacity to make informed decisions about her medical care and disposition and would need emergency guardianship.    I discussed the patients findings and my recommendations with nursing staff    Mirza Avila MD  08/04/19  3:01 PM

## 2019-08-05 ENCOUNTER — APPOINTMENT (OUTPATIENT)
Dept: CARDIOLOGY | Facility: HOSPITAL | Age: 84
End: 2019-08-05

## 2019-08-05 LAB
ANION GAP SERPL CALCULATED.3IONS-SCNC: 14.1 MMOL/L (ref 5–15)
BASOPHILS # BLD AUTO: 0.03 10*3/MM3 (ref 0–0.2)
BASOPHILS NFR BLD AUTO: 0.4 % (ref 0–1.5)
BH CV ECHO MEAS - % IVS THICK: -39.1 %
BH CV ECHO MEAS - % LVPW THICK: 13.4 %
BH CV ECHO MEAS - ACS: 2.1 CM
BH CV ECHO MEAS - AI DEC SLOPE: 213 CM/SEC^2
BH CV ECHO MEAS - AO MAX PG: 6.5 MMHG
BH CV ECHO MEAS - AO MEAN PG: 4 MMHG
BH CV ECHO MEAS - AO ROOT AREA (BSA CORRECTED): 2.7
BH CV ECHO MEAS - AO ROOT AREA: 9.3 CM^2
BH CV ECHO MEAS - AO ROOT DIAM: 3.5 CM
BH CV ECHO MEAS - AO V2 MAX: 127 CM/SEC
BH CV ECHO MEAS - AO V2 MEAN: 90.2 CM/SEC
BH CV ECHO MEAS - AO V2 VTI: 32 CM
BH CV ECHO MEAS - BSA(HAYCOCK): 1.2 M^2
BH CV ECHO MEAS - BSA: 1.3 M^2
BH CV ECHO MEAS - BZI_BMI: 15.8 KILOGRAMS/M^2
BH CV ECHO MEAS - BZI_METRIC_HEIGHT: 152.4 CM
BH CV ECHO MEAS - BZI_METRIC_WEIGHT: 36.7 KG
BH CV ECHO MEAS - EDV(CUBED): 52.5 ML
BH CV ECHO MEAS - EDV(MOD-SP4): 43 ML
BH CV ECHO MEAS - EDV(TEICH): 59.8 ML
BH CV ECHO MEAS - EF(CUBED): 70.8 %
BH CV ECHO MEAS - EF(MOD-SP4): 67.4 %
BH CV ECHO MEAS - EF(TEICH): 63.2 %
BH CV ECHO MEAS - ESV(CUBED): 15.3 ML
BH CV ECHO MEAS - ESV(MOD-SP4): 14 ML
BH CV ECHO MEAS - ESV(TEICH): 22 ML
BH CV ECHO MEAS - FS: 33.6 %
BH CV ECHO MEAS - IVS/LVPW: 1.2
BH CV ECHO MEAS - IVSD: 1.5 CM
BH CV ECHO MEAS - IVSS: 0.91 CM
BH CV ECHO MEAS - LA DIMENSION: 3.6 CM
BH CV ECHO MEAS - LA/AO: 1
BH CV ECHO MEAS - LV DIASTOLIC VOL/BSA (35-75): 33.8 ML/M^2
BH CV ECHO MEAS - LV MASS(C)D: 182.2 GRAMS
BH CV ECHO MEAS - LV MASS(C)DI: 143.4 GRAMS/M^2
BH CV ECHO MEAS - LV MASS(C)S: 79.4 GRAMS
BH CV ECHO MEAS - LV MASS(C)SI: 62.4 GRAMS/M^2
BH CV ECHO MEAS - LV SYSTOLIC VOL/BSA (12-30): 11 ML/M^2
BH CV ECHO MEAS - LVIDD: 3.7 CM
BH CV ECHO MEAS - LVIDS: 2.5 CM
BH CV ECHO MEAS - LVLD AP4: 6.3 CM
BH CV ECHO MEAS - LVLS AP4: 5.7 CM
BH CV ECHO MEAS - LVOT AREA (M): 2.5 CM^2
BH CV ECHO MEAS - LVOT AREA: 2.5 CM^2
BH CV ECHO MEAS - LVOT DIAM: 1.8 CM
BH CV ECHO MEAS - LVPWD: 1.2 CM
BH CV ECHO MEAS - LVPWS: 1.4 CM
BH CV ECHO MEAS - MV A MAX VEL: 78 CM/SEC
BH CV ECHO MEAS - MV E MAX VEL: 50.8 CM/SEC
BH CV ECHO MEAS - MV E/A: 0.65
BH CV ECHO MEAS - PA ACC SLOPE: 892 CM/SEC^2
BH CV ECHO MEAS - PA ACC TIME: 0.12 SEC
BH CV ECHO MEAS - PA PR(ACCEL): 23.7 MMHG
BH CV ECHO MEAS - RAP SYSTOLE: 10 MMHG
BH CV ECHO MEAS - RVSP: 41.6 MMHG
BH CV ECHO MEAS - SI(AO): 235.3 ML/M^2
BH CV ECHO MEAS - SI(CUBED): 29.2 ML/M^2
BH CV ECHO MEAS - SI(MOD-SP4): 22.8 ML/M^2
BH CV ECHO MEAS - SI(TEICH): 29.8 ML/M^2
BH CV ECHO MEAS - SV(AO): 299.1 ML
BH CV ECHO MEAS - SV(CUBED): 37.2 ML
BH CV ECHO MEAS - SV(MOD-SP4): 29 ML
BH CV ECHO MEAS - SV(TEICH): 37.8 ML
BH CV ECHO MEAS - TR MAX VEL: 281 CM/SEC
BUN BLD-MCNC: 21 MG/DL (ref 8–23)
BUN/CREAT SERPL: 20.2 (ref 7–25)
CALCIUM SPEC-SCNC: 9 MG/DL (ref 8.2–9.6)
CHLORIDE SERPL-SCNC: 104 MMOL/L (ref 98–107)
CO2 SERPL-SCNC: 24.9 MMOL/L (ref 22–29)
CREAT BLD-MCNC: 1.04 MG/DL (ref 0.57–1)
DEPRECATED RDW RBC AUTO: 49.3 FL (ref 37–54)
EOSINOPHIL # BLD AUTO: 1.01 10*3/MM3 (ref 0–0.4)
EOSINOPHIL NFR BLD AUTO: 12.1 % (ref 0.3–6.2)
ERYTHROCYTE [DISTWIDTH] IN BLOOD BY AUTOMATED COUNT: 15 % (ref 12.3–15.4)
GFR SERPL CREATININE-BSD FRML MDRD: 50 ML/MIN/1.73
GLUCOSE BLD-MCNC: 96 MG/DL (ref 65–99)
HCT VFR BLD AUTO: 42 % (ref 34–46.6)
HGB BLD-MCNC: 13.2 G/DL (ref 12–15.9)
IMM GRANULOCYTES # BLD AUTO: 0.02 10*3/MM3 (ref 0–0.05)
IMM GRANULOCYTES NFR BLD AUTO: 0.2 % (ref 0–0.5)
LYMPHOCYTES # BLD AUTO: 1.41 10*3/MM3 (ref 0.7–3.1)
LYMPHOCYTES NFR BLD AUTO: 16.8 % (ref 19.6–45.3)
MAGNESIUM SERPL-MCNC: 2.2 MG/DL (ref 1.6–2.4)
MAXIMAL PREDICTED HEART RATE: 130 BPM
MCH RBC QN AUTO: 29.4 PG (ref 26.6–33)
MCHC RBC AUTO-ENTMCNC: 31.4 G/DL (ref 31.5–35.7)
MCV RBC AUTO: 93.5 FL (ref 79–97)
MONOCYTES # BLD AUTO: 0.6 10*3/MM3 (ref 0.1–0.9)
MONOCYTES NFR BLD AUTO: 7.2 % (ref 5–12)
NEUTROPHILS # BLD AUTO: 5.31 10*3/MM3 (ref 1.7–7)
NEUTROPHILS NFR BLD AUTO: 63.3 % (ref 42.7–76)
PLATELET # BLD AUTO: 237 10*3/MM3 (ref 140–450)
PMV BLD AUTO: 9.7 FL (ref 6–12)
POTASSIUM BLD-SCNC: 3.7 MMOL/L (ref 3.5–5.2)
RBC # BLD AUTO: 4.49 10*6/MM3 (ref 3.77–5.28)
SODIUM BLD-SCNC: 143 MMOL/L (ref 136–145)
STRESS TARGET HR: 111 BPM
WBC NRBC COR # BLD: 8.38 10*3/MM3 (ref 3.4–10.8)

## 2019-08-05 PROCEDURE — 99232 SBSQ HOSP IP/OBS MODERATE 35: CPT | Performed by: HOSPITALIST

## 2019-08-05 PROCEDURE — 93306 TTE W/DOPPLER COMPLETE: CPT | Performed by: INTERNAL MEDICINE

## 2019-08-05 PROCEDURE — 83735 ASSAY OF MAGNESIUM: CPT | Performed by: HOSPITALIST

## 2019-08-05 PROCEDURE — 97163 PT EVAL HIGH COMPLEX 45 MIN: CPT

## 2019-08-05 PROCEDURE — 97116 GAIT TRAINING THERAPY: CPT

## 2019-08-05 PROCEDURE — 80048 BASIC METABOLIC PNL TOTAL CA: CPT | Performed by: FAMILY MEDICINE

## 2019-08-05 PROCEDURE — 93306 TTE W/DOPPLER COMPLETE: CPT

## 2019-08-05 PROCEDURE — 94799 UNLISTED PULMONARY SVC/PX: CPT

## 2019-08-05 PROCEDURE — 25010000002 HEPARIN (PORCINE) PER 1000 UNITS: Performed by: INTERNAL MEDICINE

## 2019-08-05 PROCEDURE — 85025 COMPLETE CBC W/AUTO DIFF WBC: CPT | Performed by: FAMILY MEDICINE

## 2019-08-05 RX ORDER — HYDRALAZINE HYDROCHLORIDE 10 MG/1
10 TABLET, FILM COATED ORAL EVERY 6 HOURS PRN
Status: DISCONTINUED | OUTPATIENT
Start: 2019-08-05 | End: 2019-08-08 | Stop reason: HOSPADM

## 2019-08-05 RX ORDER — HYDRALAZINE HYDROCHLORIDE 25 MG/1
25 TABLET, FILM COATED ORAL EVERY 6 HOURS PRN
Status: DISCONTINUED | OUTPATIENT
Start: 2019-08-05 | End: 2019-08-05

## 2019-08-05 RX ADMIN — HYDRALAZINE HYDROCHLORIDE 25 MG: 50 TABLET ORAL at 05:25

## 2019-08-05 RX ADMIN — HEPARIN SODIUM 5000 UNITS: 5000 INJECTION INTRAVENOUS; SUBCUTANEOUS at 08:02

## 2019-08-05 RX ADMIN — Medication 2.5 MG: at 22:20

## 2019-08-05 RX ADMIN — HEPARIN SODIUM 5000 UNITS: 5000 INJECTION INTRAVENOUS; SUBCUTANEOUS at 20:12

## 2019-08-05 RX ADMIN — AMLODIPINE BESYLATE 5 MG: 5 TABLET ORAL at 08:02

## 2019-08-05 RX ADMIN — SODIUM CHLORIDE, PRESERVATIVE FREE 3 ML: 5 INJECTION INTRAVENOUS at 20:12

## 2019-08-05 NOTE — PROGRESS NOTES
Malnutrition Severity Assessment    Patient Name:  Lolis Infante  YOB: 1929  MRN: [id number]  Admit Date:  8/3/2019    Patient meets criteria for : Severe Malnutrition    Comments:  Please review and attest if agree with RD assessment. Thank You.    Malnutrition Severity Assessment  Malnutrition Type: Chronic Disease - Related Malnutrition     Malnutrition Type (last 8 hours)      Malnutrition Severity Assessment     Row Name 08/05/19 1457       Malnutrition Severity Assessment    Malnutrition Type  Chronic Disease - Related Malnutrition    Row Name 08/05/19 1457       Insufficient Energy Intake     Insufficient Energy Intake   <75% of est. energy requirement for > or equal to 3 months    Row Name 08/05/19 1508       Unintentional Weight Loss     Unintentional Weight Loss   -- 19% wt loss in 6 months    Row Name 08/05/19 1457       Unintentional Weight Loss     Unintentional Weight Loss   Weight loss greater than 10% in six months    Row Name 08/05/19 1457       Muscle Loss    Loss of Muscle Mass Findings  Severe    [place]  Severe - deep hollowing/scooping, lack of muscle to touch, facial bones well defined    Clavicle Bone Region  Severe - protruding prominent bone    Acromion Bone Region  Severe - squared shoulders, bones, and acromion process protrusion prominent    Scapular Bone Region  Severe - prominent bones, depressions easily visible between ribs, scapula, spine, shoulders    Patellar Region  Severe - prominent bone, square looking, very little muscle definition    Anterior Thigh Region  Severe - line/depression along thigh, obviously thin    Posterior Calf Region  Severe - thin with very little definition/firmness    Row Name 08/05/19 1457       Fat Loss    Subcutaneous Fat Loss Findings  Severe    Orbital Region   Severe - pronounced hollowness/depression, dark circles, loose saggy skin    Upper Arm Region  Severe - mostly skin, very little space between folds, fingers touch     Thoracic & Lumbar Region  Severe - ribs visible with prominent depressions, iliac crest very prominent    Row Name 08/05/19 1457       Criteria Met (Must meet criteria for severity in at least 2 of these categories: M Wasting, Fat Loss, Fluid, Secondary Signs, Wt. Status, Intake)    Patient meets criteria for   Severe Malnutrition          Electronically signed by:  Riri Owen RD  08/05/19 3:13 PM

## 2019-08-05 NOTE — DISCHARGE PLACEMENT REQUEST
"Ashley Infante (89 y.o. Female)     Date of Birth Social Security Number Address Home Phone MRN    06/26/1929  [address]  [facility] [address] [phone] [id number]    Y[employer] Marital Status          Synagogue        Admission Date Admission Type Admitting Provider Attending Provider Department, Room/Bed    8/3/19 Emergency Lynda Narvaez DO Srinivas, Priyanka, MD [address], 3306/2S    Discharge Date Discharge Disposition Discharge Destination                       Attending Provider:  Iliana Flanagan MD    Allergies:  No Known Allergies    Isolation:  None   Infection:  None   Code Status:  CPR    Ht:  152.4 cm (60\")   Wt:  36.8 kg (81 lb 3.2 oz)    Admission Cmt:  None   Principal Problem:  None                Active Insurance as of 8/3/2019     Primary Coverage     Payor Plan Insurance Group Employer/Plan Group    MEDICARE MEDICARE A & B      Payor Plan Address Payor Plan Phone Number Payor Plan Fax Number Effective Dates    [address] [phone]  7/1/1994 - None Entered    [facility] [address]       Subscriber Name Subscriber Birth Date Member ID       ASHLEY INFANTE 6/26/1929 [id number]           Secondary Coverage     Payor Plan Insurance Group Employer/Plan Group    ANTHEM MEDICAID ANTH MEDICAID [id number]     Payor Plan Address Payor Plan Phone Number Payor Plan Fax Number Effective Dates    [address] [address] [phone]  1/3/2019 - None Entered    [facility] [address]       Subscriber Name Subscriber Birth Date Member ID       ASHLEY INFANTE 6/26/1929 [id number]                 Emergency Contacts      (Rel.) Home Phone Work Phone Mobile Phone    Jorge Infante (Other) [phone] -- --    DAMIÁN INFANTE (Sister) [phone] -- --            Emergency Contact Information     Name Relation Home Work Mobile    Jorge Infante Other [phone]      DAMIÁN INFANTE Sister [phone]            Insurance Information                MEDICARE/MEDICARE A & B " "Phone: [phone]    Subscriber: Lolis Infante Subscriber#: [id number]    Group#:  Precert#:         ANTHEM MEDICAID/ANTHEM MEDICAID Phone: [phone]    Subscriber: Lolis Infante Subscriber#: [id number]    Group#: [id number] Precert#:           Treatment Team     Provider Relationship Specialty Contact    Iliana Flanagan MD Attending -- [phone]    Jayda Barros, RN Utilization Manager --     Mirza Avila MD Consulting Physician Psychiatry [phone]    Julienne Monroy Patient Care Technician --     Lima Bob PT Physical Therapist Physical Therapy     Papo Welch RN Registered Nurse --           Problem List           Codes Noted - Resolved       Hospital    Hypertensive emergency ICD-10-CM: I16.1  ICD-9-CM: 401.9 8/3/2019 - Present             History & Physical      Lynda Narvaez DO at 2019 12:35 AM          Hospitalist History and Physical    Patient Identification:  Name: Lolis Infante  Age/Sex: 89 y.o. female  :  1929  MRN: [id number]         Primary Care Physician: Provider, No Known    Chief Complaint   Patient presents with   • Cough   • Back Pain       History of Present Illness  Patient is a 89 y.o. female presents with the following: falls, intermittent confusion    The patient is a 89 yo female with no past medical history who presents to the emergency department with family due to reported cough, congestion and falls.    The patient lives alone but has siblings who check in on her.  The patient's sister at her present at bedside state that their brother reported that the patient had been having some cough and congestion with \"rattling\" in her chest for approximately 2 days.  Patient apparently had a fall 2 days ago as well and has been unsteady on her feet per her sister's report.  The patient has also had decreased appetite.  Her sister states that the patient's brother will make her 1 pot of coffee per day and that she drinks \"hungry man " "meals.\"    The patient sister states that at times the patient has difficulty with memory and appears confused.  She apparently though has refused to move in with any family members and refuses to allow anyone to stay with her.  The patient does not have children.    The patient is awake and alert.  She is unable to provide history and claims that the reason for this is because she is \"tired.\"  She is oriented to self and has to be reminded of place.    In the emergency department, the patient's blood pressure was initially significantly elevated maximum blood pressure 196/117.  Her proBNP was 1929.  Chest x-ray revealed mild cardiomegaly.  CBC was unremarkable and CMP revealed a BUN of 38 with creatinine of 1.44 and a BUN to creatinine ratio of 26.4.  The patient has been admitted to the hospital service for further evaluation.    Present during visit: ROB Vance and the patient's 2 sisters    Past History:  History reviewed. No pertinent past medical history.  History reviewed. No pertinent surgical history.  Family History   Family history unknown: Yes     Social History     Tobacco Use   • Smoking status: Never Smoker   Substance Use Topics   • Alcohol use: No   • Drug use: No     No medications prior to admission.     Allergies: Patient has no known allergies.    Review of Systems:  Review of Systems   Respiratory: Positive for cough and shortness of breath.    Psychiatric/Behavioral: Positive for confusion.     Difficult to assess due to confusion/mental status change    Vital Signs  Temp:  [96.9 °F (36.1 °C)-98.2 °F (36.8 °C)] 96.9 °F (36.1 °C)  Heart Rate:  [79-85] 79  Resp:  [16-20] 16  BP: (123-196)/() 137/72  Body mass index is 16.09 kg/m².    Physical Exam:  Physical Exam   Constitutional: She appears well-developed and well-nourished. She appears cachectic. No distress.   Chronically ill appearing.   HENT:   Head: Normocephalic and atraumatic.   Mouth/Throat: Oropharynx is clear and moist. " Dental caries (Poor dentition.) present.   Eyes: Conjunctivae and EOM are normal. Pupils are equal, round, and reactive to light.   Neck: Neck supple. No tracheal deviation present. No thyromegaly present.   Cardiovascular: Normal rate and regular rhythm. Exam reveals no gallop and no friction rub.   No murmur heard.  Pulmonary/Chest: Breath sounds normal. No respiratory distress. She has no wheezes. She has no rales.   Abdominal: Soft. Bowel sounds are normal. She exhibits no distension. There is no tenderness. There is no guarding.   Musculoskeletal: Normal range of motion. She exhibits no tenderness.   Neurological: She is alert.   Follows some commands; equal strength B/L LE 3/5.   Skin: Skin is warm and dry. No rash noted. No erythema.   Psychiatric: She has a normal mood and affect.      Results Review:    Results from last 7 days   Lab Units 08/03/19  1923   PH, ARTERIAL pH units 7.421   PO2 ART mm Hg 76.7*   PCO2, ARTERIAL mm Hg 33.4*   HCO3 ART mmol/L 21.2*       Results from last 7 days   Lab Units 08/03/19  1909   WBC 10*3/mm3 7.17   HEMOGLOBIN g/dL 13.4   HEMATOCRIT % 42.1   PLATELETS 10*3/mm3 233     Results from last 7 days   Lab Units 08/03/19  1909   SODIUM mmol/L 143   POTASSIUM mmol/L 4.3   CHLORIDE mmol/L 102   CO2 mmol/L 23.5   BUN mg/dL 38*   CREATININE mg/dL 1.44*   CALCIUM mg/dL 9.5   GLUCOSE mg/dL 95     Results from last 7 days   Lab Units 08/03/19  1909   BILIRUBIN mg/dL 0.4   ALK PHOS U/L 84   AST (SGOT) U/L 26   ALT (SGPT) U/L 18     Results from last 7 days   Lab Units 08/03/19  1929   CRP mg/dL 0.28         Results from last 7 days   Lab Units 08/03/19  1909   TROPONIN T ng/mL <0.010         Imaging:    I have personally reviewed the EKG. Pending official cardiology interpretation, however, per my view: NSR with LVH; non-specific ST-T changes inferolaterally; Q waves laterally with a QTc 457 ms    Imaging Results (most recent)     Procedure Component Value Units Date/Time    XR Chest AP  [[id number]] Collected:  08/03/19 2015     Updated:  08/03/19 2017    Narrative:       CR Chest 1 Vw    INDICATION:   9-year-old female with shortness of air and cough today.     COMPARISON:    Chest x-ray 2/21/2019  CT chest 2/21/2019    FINDINGS:  Single portable AP view of the chest.  Stable mild cardiomegaly. Mediastinal contours are normal. The lungs are clear. No pneumothorax or pleural effusion. Advanced right glenohumeral arthrosis. Old, healed right-sided rib fracture.      Impression:       Stable mild cardiomegaly. No other acute chest findings.    Signer Name: Jose A Hilario MD   Signed: 8/3/2019 8:15 PM   Workstation Name: [email or web address]-    [facility]          Assessment/Plan     -Possible acute hypertensive emergency, improved after clonidine and hydralazine: Patient has been admitted to the telemetry unit. Will monitor for now and allow for permissive hypertension.     -Pre-renal azotemia, mild: Will gently hydrate x 12 hours.     -Elevated anion gap: Repeat chemistries in am after IV fluid hydration.     -Elevated pro-BNP with cardiomegaly: Obtain echocardiogram. Clinically does not appear in acute heart failure.     -Malnutrition, suspect chronic: Consult nutrition.     -Intermittent confusion, dementia versus age related cognitive decline: Obtain TSH, vitamin B12 and folate levels.  Consult physical therapy.   has been consulted for discharge planning.  I discussed with the patient's sisters and encouraged them to discuss amongst themselves and their other siblings regarding applying for patient's power of /decision-maker. It does not appear that patient can be discharged to live with one of her siblings. Family may want nursing home placement. Patient will likely require a psychiatry consultation to determine if she is competent to make medical decisions.    DVT prophylaxis: Subcutaneous heparin    Estimated Length of Stay: > 2 MNs    CODE:  FULL/CPR    I discussed the patients findings and my recommendations with family and nursing staff      Lynda Narvaez DO  08/04/19  12:35 AM    Electronically signed by Lynda Narvaez DO at 8/4/2019  2:01 AM       ICU Vital Signs     Row Name 08/05/19 0729 08/05/19 0328 08/05/19 0124 08/04/19 2000 08/04/19 1848       Height and Weight    Weight  --  36.8 kg (81 lb 3.2 oz)  --  --  --    Weight Method  --  Bed scale  --  --  --       Vitals    Temp  97.6 °F (36.4 °C)  97.1 °F (36.2 °C)  --  --  98 °F (36.7 °C)    Temp src  Oral  Axillary  --  --  Axillary    Pulse  68  71  --  --  73    Heart Rate Source  Monitor  Monitor  --  --  Monitor    Resp  18  18  --  --  18    Resp Rate Source  Visual  Visual  --  --  Visual    BP  165/89  178/102  (Abnormal)   --  --  146/100    Noninvasive MAP (mmHg)  124  121  --  --  118    BP Location  Left arm  Left arm  --  --  Left arm    BP Method  Automatic  Automatic  --  --  Automatic    Patient Position  Lying  Lying  --  --  Lying       Oxygen Therapy    SpO2  97 %  95 %  96 %  --  96 %    Pulse Oximetry Type  --  Continuous  --  --  Continuous    Device (Oxygen Therapy)  room air  room air  room air  room air  room air    Row Name 08/04/19 1436                   Vitals    Temp  98.3 °F (36.8 °C)        Heart Rate Source  Monitor        Resp  18        Resp Rate Source  Visual        BP  181/80  (Abnormal)  rn notified        BP Method  Automatic        Patient Position  Lying           Oxygen Therapy    SpO2  98 %            Intake & Output (last day)       08/04 0701 - 08/05 0700 08/05 0701 - 08/06 0700    P.O. 240     IV Piggyback      Total Intake(mL/kg) 240 (6.5)     Urine (mL/kg/hr)      Total Output      Net +240           Urine Unmeasured Occurrence 8 x         Hospital Medications (active)       Dose Frequency Start End    amLODIPine (NORVASC) tablet 5 mg 5 mg Every 24 Hours Scheduled 8/4/2019     Sig - Route: Take 1 tablet by mouth Daily. - Oral     "heparin (porcine) 5000 UNIT/ML injection 5,000 Units 5,000 Units Every 12 Hours Scheduled 8/4/2019     Sig - Route: Inject 1 mL under the skin into the appropriate area as directed Every 12 (Twelve) Hours. - Subcutaneous    hydrALAZINE (APRESOLINE) tablet 10 mg 10 mg Every 6 Hours PRN 8/5/2019     Sig - Route: Take 1 tablet by mouth Every 6 (Six) Hours As Needed (sustained SBP >170, DBP >100). - Oral    melatonin tablet 2.5 mg 2.5 mg Nightly PRN 8/4/2019     Sig - Route: Take 0.5 tablets by mouth At Night As Needed for Sleep. - Oral    nitroglycerin (NITROSTAT) SL tablet 0.4 mg 0.4 mg Every 5 Minutes PRN 8/4/2019     Sig - Route: Place 1 tablet under the tongue Every 5 (Five) Minutes As Needed for Chest Pain (Only if SBP Greater Than 100). - Sublingual    sodium chloride 0.9 % flush 10 mL 10 mL As Needed 8/3/2019     Sig - Route: Infuse 10 mL into a venous catheter As Needed for Line Care. - Intravenous    sodium chloride 0.9 % flush 10 mL 10 mL As Needed 8/3/2019     Sig - Route: Infuse 10 mL into a venous catheter As Needed for Line Care. - Intravenous    Cosign for Ordering: Accepted by Asha Dillon DO on 8/5/2019  7:57 AM    Linked Group 1:  \"And\" Linked Group Details        sodium chloride 0.9 % flush 3 mL 3 mL Every 12 Hours Scheduled 8/4/2019     Sig - Route: Infuse 3 mL into a venous catheter Every 12 (Twelve) Hours. - Intravenous    sodium chloride 0.9 % flush 3-10 mL 3-10 mL As Needed 8/4/2019     Sig - Route: Infuse 3-10 mL into a venous catheter As Needed for Line Care. - Intravenous    hydrALAZINE (APRESOLINE) tablet 25 mg (Discontinued) 25 mg Every 6 Hours PRN 8/5/2019 8/5/2019    Sig - Route: Take 1 tablet by mouth Every 6 (Six) Hours As Needed (sustained SBP >170, DBP >100). - Oral    sodium chloride 0.9 % infusion (Discontinued) 50 mL/hr Continuous 8/4/2019 8/5/2019    Sig - Route: Infuse 50 mL/hr into a venous catheter Continuous. - Intravenous            Lab Results (last 24 hours)     " Procedure Component Value Units Date/Time    Magnesium [[id number]]  (Normal) Collected:  08/05/19 0720    Specimen:  Blood Updated:  08/05/19 1155     Magnesium 2.2 mg/dL     Basic Metabolic Panel [[id number]]  (Abnormal) Collected:  08/05/19 0720    Specimen:  Blood Updated:  08/05/19 0806     Glucose 96 mg/dL      BUN 21 mg/dL      Creatinine 1.04 mg/dL      Sodium 143 mmol/L      Potassium 3.7 mmol/L      Chloride 104 mmol/L      CO2 24.9 mmol/L      Calcium 9.0 mg/dL      eGFR Non African Amer 50 mL/min/1.73      BUN/Creatinine Ratio 20.2     Anion Gap 14.1 mmol/L     Narrative:       GFR Normal >60  Chronic Kidney Disease <60  Kidney Failure <15    CBC & Differential [[id number]] Collected:  08/05/19 0720    Specimen:  Blood Updated:  08/05/19 0743    Narrative:       The following orders were created for panel order CBC & Differential.  Procedure                               Abnormality         Status                     ---------                               -----------         ------                     CBC Auto Differential[[id number]]        Abnormal            Final result                 Please view results for these tests on the individual orders.    CBC Auto Differential [[id number]]  (Abnormal) Collected:  08/05/19 0720    Specimen:  Blood Updated:  08/05/19 0743     WBC 8.38 10*3/mm3      RBC 4.49 10*6/mm3      Hemoglobin 13.2 g/dL      Hematocrit 42.0 %      MCV 93.5 fL      MCH 29.4 pg      MCHC 31.4 g/dL      RDW 15.0 %      RDW-SD 49.3 fl      MPV 9.7 fL      Platelets 237 10*3/mm3      Neutrophil % 63.3 %      Lymphocyte % 16.8 %      Monocyte % 7.2 %      Eosinophil % 12.1 %      Basophil % 0.4 %      Immature Grans % 0.2 %      Neutrophils, Absolute 5.31 10*3/mm3      Lymphocytes, Absolute 1.41 10*3/mm3      Monocytes, Absolute 0.60 10*3/mm3      Eosinophils, Absolute 1.01 10*3/mm3      Basophils, Absolute 0.03 10*3/mm3      Immature Grans, Absolute 0.02 10*3/mm3     Blood Culture - Blood, Arm,  Left [[id number]] Collected:  08/03/19 1931    Specimen:  Blood from Arm, Left Updated:  08/04/19 1945     Blood Culture No growth at 24 hours    Blood Culture - Blood, Arm, Right [[id number]] Collected:  08/03/19 1928    Specimen:  Blood from Arm, Right Updated:  08/04/19 1945     Blood Culture No growth at 24 hours        Imaging Results (last 24 hours)     ** No results found for the last 24 hours. **        ECG/EMG Results (last 24 hours)     Procedure Component Value Units Date/Time    ECG 12 Lead [[id number]] Collected:  08/03/19 1853     Updated:  08/04/19 1743    Narrative:       Test Reason : SOA Triage Protocol  Blood Pressure : **/** mmHG  Vent. Rate : 074 BPM     Atrial Rate : 074 BPM     P-R Int : 148 ms          QRS Dur : 078 ms      QT Int : 412 ms       P-R-T Axes : 079 -19 054 degrees     QTc Int : 457 ms    Normal sinus rhythm  Minimal voltage criteria for LVH, may be normal variant  Borderline ECG  When compared with ECG of 21-FEB-2019 22:23,  No significant change was found  Confirmed by Mark Yanez (2020) on 8/4/2019 5:43:40 PM    Referred By:  SHARON           Confirmed By:Makr Yanez          Orders (last 24 hrs)     Start     Ordered    08/06/19 0600  Basic Metabolic Panel  Morning Draw      08/05/19 1119    08/05/19 1121  Case Management  Consult  Once     Provider:  (Not yet assigned)    08/05/19 1120    08/05/19 1119  hydrALAZINE (APRESOLINE) tablet 10 mg  Every 6 Hours PRN      08/05/19 1119    08/05/19 1119  Magnesium  Once      08/05/19 1119    08/05/19 0600  CBC & Differential  Morning Draw      08/04/19 1121    08/05/19 0600  Basic Metabolic Panel  Morning Draw      08/04/19 1121    08/05/19 0600  CBC Auto Differential  PROCEDURE ONCE      08/05/19 0002    08/05/19 0442  hydrALAZINE (APRESOLINE) tablet 25 mg  Every 6 Hours PRN,   Status:  Discontinued      08/05/19 0443    08/04/19 1730  amLODIPine (NORVASC) tablet 5 mg  Every 24 Hours Scheduled      08/04/19  1617    08/04/19 0245  sodium chloride 0.9 % infusion  Continuous,   Status:  Discontinued      08/04/19 0155    08/04/19 0135  melatonin tablet 2.5 mg  Nightly PRN      08/04/19 0135    08/04/19 0130  sodium chloride 0.9 % flush 3 mL  Every 12 Hours Scheduled      08/04/19 0042    08/04/19 0130  heparin (porcine) 5000 UNIT/ML injection 5,000 Units  Every 12 Hours Scheduled      08/04/19 0042    08/04/19 0042  sodium chloride 0.9 % flush 3-10 mL  As Needed      08/04/19 0042    08/04/19 0042  nitroglycerin (NITROSTAT) SL tablet 0.4 mg  Every 5 Minutes PRN      08/04/19 0042 08/03/19 2018  sodium chloride 0.9 % flush 10 mL  As Needed      08/03/19 2018 08/03/19 1818  sodium chloride 0.9 % flush 10 mL  As Needed      08/03/19 1818    Unscheduled  Telemetry - Pulse Oximetry  Continuous PRN     Comments:  If Patient Develops Unresponsiveness, Acute Dyspnea, Cyanosis or Suspected Hypoxemia Start Continuous Pulse Ox Monitoring, Apply Oxygen & Notify Provider    08/04/19 0042    Unscheduled  Oxygen Therapy- Nasal Cannula; Titrate for SPO2: 90% - 95%  Continuous PRN     Comments:  If Patient Develops Unresponsiveness, Acute Dyspnea, Cyanosis or Suspected Hypoxemia Start Continuous Pulse Ox Monitoring, Apply Oxygen & Notify Provider    08/04/19 0042    Unscheduled  ECG 12 Lead  As Needed     Comments:  Nurse to Release if Patient Expericences Acute Chest Pain or Dysrhythmias    08/04/19 0042    Unscheduled  Potassium  As Needed     Comments:  For Ventricular Arrhythmias      08/04/19 0042    Unscheduled  Magnesium  As Needed     Comments:  For Ventricular Arrhythmias      08/04/19 0042    Unscheduled  Troponin  As Needed     Comments:  For Chest Pain      08/04/19 0042    Unscheduled  Digoxin Level  As Needed     Comments:  For Atrial Arrhythmias      08/04/19 0042    Unscheduled  Blood Gas, Arterial  As Needed     Comments:  Per O2 PolicyNotify Physician      08/04/19 0042    Unscheduled  Up With Assistance  As Needed       08/04/19 0042          Physician Progress Notes (last 24 hours) (Notes from 8/4/2019  1:44 PM through 8/5/2019  1:44 PM)     No notes of this type exist for this encounter.        Physical Therapy Notes (most recent note)     No notes of this type exist for this encounter.        Occupational Therapy Notes (most recent note)     No notes of this type exist for this encounter.

## 2019-08-05 NOTE — PROGRESS NOTES
Discharge Planning Assessment  [facility]     Patient Name: Lolis Infante  MRN: [id number]  Today's Date: 8/5/2019    Admit Date: 8/3/2019    Discharge Needs Assessment     Row Name 08/05/19 1124       Living Environment    Lives With  alone    Current Living Arrangements  home/apartment/condo    Primary Care Provided by  self;child(ashley)    Provides Primary Care For  no one, unable/limited ability to care for self    Family Caregiver if Needed  child(ashley), adult    Quality of Family Relationships  involved    Able to Return to Prior Arrangements  yes       Transition Planning    Patient/Family Anticipates Transition to  inpatient rehabilitation facility    Patient/Family Anticipated Services at Transition  skilled nursing       Discharge Needs Assessment    Equipment Currently Used at Home  none    Equipment Needed After Discharge  none        Discharge Plan     Row Name 08/05/19 0714       Plan    Plan  SS spoke with pt's josé memebers on this day. Pt's family is planning to obtain emergency gaurdiansip, in order to acheive nursing home placement. SS contacted the [facility] who states that they have no beds. SS contacted [facility] at [facility] [place] and Denisse Rene at [facility], and Elvia at [facility]. SS sent pt's referral to [facility] [place], [facility], and [facility]. SS will follow and assist with discharge planning.     Patient/Family in Agreement with Plan  yes    Row Name 08/05/19 1121       Plan    Plan  SS spoke with pt's sister Teresa on this day. Pt lives at home with no  services. Pt uses No DME at home. Pt does not have a POA or a living will. Pt does not have a PCP, and she does not use a pharmacy because she does not take her medications according to Teresa. Pt's family plans to speak with SS this afternoon about gaurdianship and nursing home placement for pt. SS will follow and and assist with discharge planning.     Patient/Family in Agreement with Plan  yes         Destination      Service Provider Request Status Selected Services Address Phone Number Fax Number    [facility] - Request Sent N/A [address] [facility] [address] [phone]    [facility] - Request Sent N/A [address] [address] [phone]    [facility] - Request Sent N/A [address] [facility] [id number] [phone]          Demographic Summary     Row Name 08/05/19 1123       General Information    Admission Type  inpatient    Referral Source  nursing    Reason for Consult  discharge planning    Preferred Language  English     Used During This Interaction  no        DONNY CastañedaW

## 2019-08-05 NOTE — PLAN OF CARE
Problem: Patient Care Overview  Goal: Plan of Care Review  Outcome: Ongoing (interventions implemented as appropriate)    Goal: Individualization and Mutuality  Outcome: Ongoing (interventions implemented as appropriate)    Goal: Discharge Needs Assessment  Outcome: Ongoing (interventions implemented as appropriate)    Goal: Interprofessional Rounds/Family Conf  Outcome: Ongoing (interventions implemented as appropriate)      Problem: Fall Risk (Adult)  Goal: Identify Related Risk Factors and Signs and Symptoms  Outcome: Outcome(s) achieved Date Met: 08/05/19    Goal: Absence of Fall  Outcome: Ongoing (interventions implemented as appropriate)      Problem: Hypertensive Disease/Crisis (Arterial) (Adult)  Goal: Signs and Symptoms of Listed Potential Problems Will be Absent, Minimized or Managed (Hypertensive Disease/Crisis)  Outcome: Ongoing (interventions implemented as appropriate)      Problem: Skin Injury Risk (Adult)  Goal: Identify Related Risk Factors and Signs and Symptoms  Outcome: Outcome(s) achieved Date Met: 08/05/19    Goal: Skin Health and Integrity  Outcome: Ongoing (interventions implemented as appropriate)

## 2019-08-05 NOTE — DISCHARGE PLACEMENT REQUEST
"Ashley Infante (89 y.o. Female)     Date of Birth Social Security Number Address Home Phone MRN    06/26/1929  [address]  [facility] [address] [phone] [id number]    Orthodoxy Marital Status          Confucianism        Admission Date Admission Type Admitting Provider Attending Provider Department, Room/Bed    8/3/19 Emergency Lynda Narvaez DO Srinivas, Priyanka, MD [address], 3306/2S    Discharge Date Discharge Disposition Discharge Destination                       Attending Provider:  Iliana Flanagan MD    Allergies:  No Known Allergies    Isolation:  None   Infection:  None   Code Status:  CPR    Ht:  152.4 cm (60\")   Wt:  36.8 kg (81 lb 3.2 oz)    Admission Cmt:  None   Principal Problem:  None                Active Insurance as of 8/3/2019     Primary Coverage     Payor Plan Insurance Group Employer/Plan Group    MEDICARE MEDICARE A & B      Payor Plan Address Payor Plan Phone Number Payor Plan Fax Number Effective Dates    [address] [phone]  7/1/1994 - None Entered    [facility] [address]       Subscriber Name Subscriber Birth Date Member ID       ASHLEY INFANTE 6/26/1929 [id number]           Secondary Coverage     Payor Plan Insurance Group Employer/Plan Group    ANTHEM MEDICAID ANTH MEDICAID [id number]     Payor Plan Address Payor Plan Phone Number Payor Plan Fax Number Effective Dates    [address] [address] [phone]  1/3/2019 - None Entered    [facility] [address]       Subscriber Name Subscriber Birth Date Member ID       ASHLEY INFANTE 6/26/1929 [id number]                 Emergency Contacts      (Rel.) Home Phone Work Phone Mobile Phone    Jorge Infante (Other) [phone] -- --    DAMIÁN INFANTE (Sister) [phone] -- --            Emergency Contact Information     Name Relation Home Work Mobile    Jorge Infante Other [phone]      DAMIÁN INFANTE Sister [phone]            Insurance Information                MEDICARE/MEDICARE A & B " "Phone: [phone]    Subscriber: Lolis Infante Subscriber#: [id number]    Group#:  Precert#:         ANTHEM MEDICAID/ANTHEM MEDICAID Phone: [phone]    Subscriber: Lolis Infante Subscriber#: [id number]    Group#: [id number] Precert#:              History & Physical      Lynda Narvaez DO at 2019 12:35 AM          Hospitalist History and Physical    Patient Identification:  Name: Lolis Infante  Age/Sex: 89 y.o. female  :  1929  MRN: [id number]         Primary Care Physician: Provider, No Known    Chief Complaint   Patient presents with   • Cough   • Back Pain       History of Present Illness  Patient is a 89 y.o. female presents with the following: falls, intermittent confusion    The patient is a 89 yo female with no past medical history who presents to the emergency department with family due to reported cough, congestion and falls.    The patient lives alone but has siblings who check in on her.  The patient's sister at her present at bedside state that their brother reported that the patient had been having some cough and congestion with \"rattling\" in her chest for approximately 2 days.  Patient apparently had a fall 2 days ago as well and has been unsteady on her feet per her sister's report.  The patient has also had decreased appetite.  Her sister states that the patient's brother will make her 1 pot of coffee per day and that she drinks \"hungry man meals.\"    The patient sister states that at times the patient has difficulty with memory and appears confused.  She apparently though has refused to move in with any family members and refuses to allow anyone to stay with her.  The patient does not have children.    The patient is awake and alert.  She is unable to provide history and claims that the reason for this is because she is \"tired.\"  She is oriented to self and has to be reminded of place.    In the emergency department, the patient's blood pressure was initially significantly " elevated maximum blood pressure 196/117.  Her proBNP was 1929.  Chest x-ray revealed mild cardiomegaly.  CBC was unremarkable and CMP revealed a BUN of 38 with creatinine of 1.44 and a BUN to creatinine ratio of 26.4.  The patient has been admitted to the hospital service for further evaluation.    Present during visit: ROB Vance and the patient's 2 sisters    Past History:  History reviewed. No pertinent past medical history.  History reviewed. No pertinent surgical history.  Family History   Family history unknown: Yes     Social History     Tobacco Use   • Smoking status: Never Smoker   Substance Use Topics   • Alcohol use: No   • Drug use: No     No medications prior to admission.     Allergies: Patient has no known allergies.    Review of Systems:  Review of Systems   Respiratory: Positive for cough and shortness of breath.    Psychiatric/Behavioral: Positive for confusion.     Difficult to assess due to confusion/mental status change    Vital Signs  Temp:  [96.9 °F (36.1 °C)-98.2 °F (36.8 °C)] 96.9 °F (36.1 °C)  Heart Rate:  [79-85] 79  Resp:  [16-20] 16  BP: (123-196)/() 137/72  Body mass index is 16.09 kg/m².    Physical Exam:  Physical Exam   Constitutional: She appears well-developed and well-nourished. She appears cachectic. No distress.   Chronically ill appearing.   HENT:   Head: Normocephalic and atraumatic.   Mouth/Throat: Oropharynx is clear and moist. Dental caries (Poor dentition.) present.   Eyes: Conjunctivae and EOM are normal. Pupils are equal, round, and reactive to light.   Neck: Neck supple. No tracheal deviation present. No thyromegaly present.   Cardiovascular: Normal rate and regular rhythm. Exam reveals no gallop and no friction rub.   No murmur heard.  Pulmonary/Chest: Breath sounds normal. No respiratory distress. She has no wheezes. She has no rales.   Abdominal: Soft. Bowel sounds are normal. She exhibits no distension. There is no tenderness. There is no guarding.    Musculoskeletal: Normal range of motion. She exhibits no tenderness.   Neurological: She is alert.   Follows some commands; equal strength B/L LE 3/5.   Skin: Skin is warm and dry. No rash noted. No erythema.   Psychiatric: She has a normal mood and affect.      Results Review:    Results from last 7 days   Lab Units 08/03/19  1923   PH, ARTERIAL pH units 7.421   PO2 ART mm Hg 76.7*   PCO2, ARTERIAL mm Hg 33.4*   HCO3 ART mmol/L 21.2*       Results from last 7 days   Lab Units 08/03/19  1909   WBC 10*3/mm3 7.17   HEMOGLOBIN g/dL 13.4   HEMATOCRIT % 42.1   PLATELETS 10*3/mm3 233     Results from last 7 days   Lab Units 08/03/19  1909   SODIUM mmol/L 143   POTASSIUM mmol/L 4.3   CHLORIDE mmol/L 102   CO2 mmol/L 23.5   BUN mg/dL 38*   CREATININE mg/dL 1.44*   CALCIUM mg/dL 9.5   GLUCOSE mg/dL 95     Results from last 7 days   Lab Units 08/03/19  1909   BILIRUBIN mg/dL 0.4   ALK PHOS U/L 84   AST (SGOT) U/L 26   ALT (SGPT) U/L 18     Results from last 7 days   Lab Units 08/03/19  1929   CRP mg/dL 0.28         Results from last 7 days   Lab Units 08/03/19  1909   TROPONIN T ng/mL <0.010         Imaging:    I have personally reviewed the EKG. Pending official cardiology interpretation, however, per my view: NSR with LVH; non-specific ST-T changes inferolaterally; Q waves laterally with a QTc 457 ms    Imaging Results (most recent)     Procedure Component Value Units Date/Time    XR Chest AP [[id number]] Collected:  08/03/19 2015     Updated:  08/03/19 2017    Narrative:       CR Chest 1 Vw    INDICATION:   9-year-old female with shortness of air and cough today.     COMPARISON:    Chest x-ray 2/21/2019  CT chest 2/21/2019    FINDINGS:  Single portable AP view of the chest.  Stable mild cardiomegaly. Mediastinal contours are normal. The lungs are clear. No pneumothorax or pleural effusion. Advanced right glenohumeral arthrosis. Old, healed right-sided rib fracture.      Impression:       Stable mild cardiomegaly. No other  acute chest findings.    Signer Name: Jose A Hilario MD   Signed: 8/3/2019 8:15 PM   Workstation Name: [email or web address]    [facility]          Assessment/Plan     -Possible acute hypertensive emergency, improved after clonidine and hydralazine: Patient has been admitted to the telemetry unit. Will monitor for now and allow for permissive hypertension.     -Pre-renal azotemia, mild: Will gently hydrate x 12 hours.     -Elevated anion gap: Repeat chemistries in am after IV fluid hydration.     -Elevated pro-BNP with cardiomegaly: Obtain echocardiogram. Clinically does not appear in acute heart failure.     -Malnutrition, suspect chronic: Consult nutrition.     -Intermittent confusion, dementia versus age related cognitive decline: Obtain TSH, vitamin B12 and folate levels.  Consult physical therapy.   has been consulted for discharge planning.  I discussed with the patient's sisters and encouraged them to discuss amongst themselves and their other siblings regarding applying for patient's power of /decision-maker. It does not appear that patient can be discharged to live with one of her siblings. Family may want nursing home placement. Patient will likely require a psychiatry consultation to determine if she is competent to make medical decisions.    DVT prophylaxis: Subcutaneous heparin    Estimated Length of Stay: > 2 MNs    CODE: FULL/CPR    I discussed the patients findings and my recommendations with family and nursing staff      Lynda Narvaez DO  08/04/19  12:35 AM    Electronically signed by Lynda Narvaez DO at 8/4/2019  2:01 AM       Hospital Medications (active)       Dose Frequency Start End    amLODIPine (NORVASC) tablet 5 mg 5 mg Every 24 Hours Scheduled 8/4/2019     Sig - Route: Take 1 tablet by mouth Daily. - Oral    heparin (porcine) 5000 UNIT/ML injection 5,000 Units 5,000 Units Every 12 Hours Scheduled 8/4/2019     Sig - Route: Inject 1 mL  "under the skin into the appropriate area as directed Every 12 (Twelve) Hours. - Subcutaneous    hydrALAZINE (APRESOLINE) tablet 10 mg 10 mg Every 6 Hours PRN 8/5/2019     Sig - Route: Take 1 tablet by mouth Every 6 (Six) Hours As Needed (sustained SBP >170, DBP >100). - Oral    melatonin tablet 2.5 mg 2.5 mg Nightly PRN 8/4/2019     Sig - Route: Take 0.5 tablets by mouth At Night As Needed for Sleep. - Oral    nitroglycerin (NITROSTAT) SL tablet 0.4 mg 0.4 mg Every 5 Minutes PRN 8/4/2019     Sig - Route: Place 1 tablet under the tongue Every 5 (Five) Minutes As Needed for Chest Pain (Only if SBP Greater Than 100). - Sublingual    sodium chloride 0.9 % flush 10 mL 10 mL As Needed 8/3/2019     Sig - Route: Infuse 10 mL into a venous catheter As Needed for Line Care. - Intravenous    sodium chloride 0.9 % flush 10 mL 10 mL As Needed 8/3/2019     Sig - Route: Infuse 10 mL into a venous catheter As Needed for Line Care. - Intravenous    Cosign for Ordering: Accepted by Asha Dillon DO on 8/5/2019  7:57 AM    Linked Group 1:  \"And\" Linked Group Details        sodium chloride 0.9 % flush 3 mL 3 mL Every 12 Hours Scheduled 8/4/2019     Sig - Route: Infuse 3 mL into a venous catheter Every 12 (Twelve) Hours. - Intravenous    sodium chloride 0.9 % flush 3-10 mL 3-10 mL As Needed 8/4/2019     Sig - Route: Infuse 3-10 mL into a venous catheter As Needed for Line Care. - Intravenous    hydrALAZINE (APRESOLINE) tablet 25 mg (Discontinued) 25 mg Every 6 Hours PRN 8/5/2019 8/5/2019    Sig - Route: Take 1 tablet by mouth Every 6 (Six) Hours As Needed (sustained SBP >170, DBP >100). - Oral    sodium chloride 0.9 % infusion (Discontinued) 50 mL/hr Continuous 8/4/2019 8/5/2019    Sig - Route: Infuse 50 mL/hr into a venous catheter Continuous. - Intravenous            Lab Results (last 24 hours)     Procedure Component Value Units Date/Time    Magnesium [[id number]]  (Normal) Collected:  08/05/19 3766    Specimen:  Blood Updated:  " 08/05/19 1155     Magnesium 2.2 mg/dL     Basic Metabolic Panel [[id number]]  (Abnormal) Collected:  08/05/19 0720    Specimen:  Blood Updated:  08/05/19 0806     Glucose 96 mg/dL      BUN 21 mg/dL      Creatinine 1.04 mg/dL      Sodium 143 mmol/L      Potassium 3.7 mmol/L      Chloride 104 mmol/L      CO2 24.9 mmol/L      Calcium 9.0 mg/dL      eGFR Non African Amer 50 mL/min/1.73      BUN/Creatinine Ratio 20.2     Anion Gap 14.1 mmol/L     Narrative:       GFR Normal >60  Chronic Kidney Disease <60  Kidney Failure <15    CBC & Differential [[id number]] Collected:  08/05/19 0720    Specimen:  Blood Updated:  08/05/19 0743    Narrative:       The following orders were created for panel order CBC & Differential.  Procedure                               Abnormality         Status                     ---------                               -----------         ------                     CBC Auto Differential[[id number]]        Abnormal            Final result                 Please view results for these tests on the individual orders.    CBC Auto Differential [[id number]]  (Abnormal) Collected:  08/05/19 0720    Specimen:  Blood Updated:  08/05/19 0743     WBC 8.38 10*3/mm3      RBC 4.49 10*6/mm3      Hemoglobin 13.2 g/dL      Hematocrit 42.0 %      MCV 93.5 fL      MCH 29.4 pg      MCHC 31.4 g/dL      RDW 15.0 %      RDW-SD 49.3 fl      MPV 9.7 fL      Platelets 237 10*3/mm3      Neutrophil % 63.3 %      Lymphocyte % 16.8 %      Monocyte % 7.2 %      Eosinophil % 12.1 %      Basophil % 0.4 %      Immature Grans % 0.2 %      Neutrophils, Absolute 5.31 10*3/mm3      Lymphocytes, Absolute 1.41 10*3/mm3      Monocytes, Absolute 0.60 10*3/mm3      Eosinophils, Absolute 1.01 10*3/mm3      Basophils, Absolute 0.03 10*3/mm3      Immature Grans, Absolute 0.02 10*3/mm3     Blood Culture - Blood, Arm, Left [[id number]] Collected:  08/03/19 1931    Specimen:  Blood from Arm, Left Updated:  08/04/19 1945     Blood Culture No growth at  24 hours    Blood Culture - Blood, Arm, Right [[id number]] Collected:  19    Specimen:  Blood from Arm, Right Updated:  19     Blood Culture No growth at 24 hours        Imaging Results (last 24 hours)     ** No results found for the last 24 hours. **        ECG/EMG Results (last 24 hours)     Procedure Component Value Units Date/Time    ECG 12 Lead [[id number]] Collected:  19     Updated:  19    Narrative:       Test Reason : SOA Triage Protocol  Blood Pressure : **/** mmHG  Vent. Rate : 074 BPM     Atrial Rate : 074 BPM     P-R Int : 148 ms          QRS Dur : 078 ms      QT Int : 412 ms       P-R-T Axes : 079 -19 054 degrees     QTc Int : 457 ms    Normal sinus rhythm  Minimal voltage criteria for LVH, may be normal variant  Borderline ECG  When compared with ECG of 2019 22:23,  No significant change was found  Confirmed by Mark Yanez () on 2019 5:43:40 PM    Referred By:  SHARON           Confirmed By:Mark Yanez             Physician Progress Notes (most recent note)      Seth Vargas MD at 2019 11:22 AM              [facility] HOSPITALIST PROGRESS NOTE     Patient Identification:  Name:  Lolis Infante  Age:  89 y.o.  Sex:  female  :  1929  MRN:  [id number]  Visit Number:  [id number]  ROOM: [address] [place] [address]     Primary Care Provider:  Provider, No Known    Length of stay in inpatient status:  1    Subjective     Chief Compliant:    Chief Complaint   Patient presents with   • Cough   • Back Pain       History of Presenting Illness: 89-year-old female who was brought to the emergency department yesterday evening secondary to recent confusion with falls at home.  Patient had decreased appetite.  Patient currently lives at home by herself.  Patient was found to have severe hypertension and acute kidney injury.  Patient was admitted to the hospital given gentle hydration and her blood pressures been better become better controlled  overnight.  Patient does have some decreased auditory acuity area family would like nursing home placement for patient, cording to H&P.  Family is not available this morning.    Objective     Current Hospital Meds:  heparin (porcine) 5,000 Units Subcutaneous Q12H   sodium chloride 3 mL Intravenous Q12H     sodium chloride 50 mL/hr Last Rate: 50 mL/hr (08/04/19 0828)     ----------------------------------------------------------------------------------------------------------------------  Vital Signs:  Temp:  [96.9 °F (36.1 °C)-98.2 °F (36.8 °C)] 97.2 °F (36.2 °C)  Heart Rate:  [68-85] 68  Resp:  [16-20] 18  BP: (123-196)/() 169/79  SpO2:  [94 %-98 %] 97 %  on   ;   Device (Oxygen Therapy): room air  Body mass index is 16.09 kg/m².    Wt Readings from Last 3 Encounters:   08/04/19 37.4 kg (82 lb 6.4 oz)   02/21/19 45.4 kg (100 lb)   07/12/16 47.6 kg (105 lb)     Intake & Output (last 3 days)       08/01 0701 - 08/02 0700 08/02 0701 - 08/03 0700 08/03 0701 - 08/04 0700 08/04 0701 - 08/05 0700    P.O.   100     IV Piggyback   250     Total Intake(mL/kg)   350 (9.4)     Urine (mL/kg/hr)   200     Total Output   200     Net   +150                 Diet Soft Texture; Chopped  ----------------------------------------------------------------------------------------------------------------------  Physical exam:  Constitutional: Frail malnourished appearing elderly female in no distress  HEENT: Normal cephalic atraumatic  Neck:   Supple  Cardiovascular: Regular rate and rhythm  Pulmonary/Chest: Clear to auscultation  Abdominal: Positive bowel sounds soft.   Musculoskeletal: No obvious arthropathy  Neurological: He is oriented to person.  Slightly confused to time and place.  No focal deficits  Skin: No rashes  Peripheral vascular:  Genitourinary:  ----------------------------------------------------------------------------------------------------------------------    Last echocardiogram:      ----------------------------------------------------------------------------------------------------------------------  Results from last 7 days   Lab Units 08/04/19 0109 08/03/19 1929 08/03/19 1909   CRP mg/dL  --  0.28  --    LACTATE mmol/L  --  1.4  --    WBC 10*3/mm3 9.46  --  7.17   HEMOGLOBIN g/dL 12.8  --  13.4   HEMATOCRIT % 40.7  --  42.1   MCV fL 94.0  --  93.1   MCHC g/dL 31.4*  --  31.8   PLATELETS 10*3/mm3 242  --  233     Results from last 7 days   Lab Units 08/03/19 1923   PH, ARTERIAL pH units 7.421   PO2 ART mm Hg 76.7*   PCO2, ARTERIAL mm Hg 33.4*   HCO3 ART mmol/L 21.2*     Results from last 7 days   Lab Units 08/04/19 0109 08/03/19 1909   SODIUM mmol/L 142 143   POTASSIUM mmol/L 4.1 4.3   CHLORIDE mmol/L 106 102   CO2 mmol/L 21.6* 23.5   BUN mg/dL 36* 38*   CREATININE mg/dL 1.29* 1.44*   EGFR IF NONAFRICN AM mL/min/1.73 39* 34*   CALCIUM mg/dL 9.3 9.5   GLUCOSE mg/dL 94 95   ALBUMIN g/dL 3.47* 3.89   BILIRUBIN mg/dL 0.4 0.4   ALK PHOS U/L 77 84   AST (SGOT) U/L 23 26   ALT (SGPT) U/L 16 18   Estimated Creatinine Clearance: 17.1 mL/min (A) (by C-G formula based on SCr of 1.29 mg/dL (H)).  No results found for: AMMONIA  Results from last 7 days   Lab Units 08/04/19  0554 08/04/19 0109 08/03/19  1909   TROPONIN T ng/mL <0.010 <0.010 <0.010     Results from last 7 days   Lab Units 08/03/19  1909   PROBNP pg/mL 1,929.0*         No results found for: HGBA1C, POCGLU  Lab Results   Component Value Date    TSH 2.410 08/04/2019     No results found for: PREGTESTUR, PREGSERUM, HCG, HCGQUANT  Pain Management Panel     There is no flowsheet data to display.        Brief Urine Lab Results  (Last result in the past 365 days)      Color   Clarity   Blood   Leuk Est   Nitrite   Protein   CREAT   Urine HCG        08/04/19 0619 Yellow Clear Negative Trace Negative Trace                Results from last 7 days   Lab Units 08/04/19  0619   NITRITE UA  Negative   WBC UA /HPF 6-12*   BACTERIA UA /HPF None Seen    OLAYINKA FRANKEL UA /HPF 0-2              Results from last 7 days   Lab Units 08/03/19  1929   LACTATE mmol/L 1.4   CRP mg/dL 0.28       I have personally looked at the labs and they are summarized above.  ----------------------------------------------------------------------------------------------------------------------  Detailed radiology reports for the last 24 hours:    Imaging Results (last 24 hours)     Procedure Component Value Units Date/Time    XR Chest AP [[id number]] Collected:  08/03/19 2015     Updated:  08/03/19 2017    Narrative:       CR Chest 1 Vw    INDICATION:   9-year-old female with shortness of air and cough today.     COMPARISON:    Chest x-ray 2/21/2019  CT chest 2/21/2019    FINDINGS:  Single portable AP view of the chest.  Stable mild cardiomegaly. Mediastinal contours are normal. The lungs are clear. No pneumothorax or pleural effusion. Advanced right glenohumeral arthrosis. Old, healed right-sided rib fracture.      Impression:       Stable mild cardiomegaly. No other acute chest findings.    Signer Name: Jose A Hilario MD   Signed: 8/3/2019 8:15 PM   Workstation Name: [email or web address]    [facility]        Final impressions for the last 30 days of radiology reports:    Xr Chest Ap    Result Date: 8/3/2019  Stable mild cardiomegaly. No other acute chest findings. Signer Name: Jose A Hilario MD  Signed: 8/3/2019 8:15 PM  Workstation Name: [email or web address]  [facility]    I have personally looked at the radiology images and read the final radiology report.    Assessment & Plan     Severe hypertension--much improved    Acute kidney injury--improved with gentle hydration    Intermittent confusion--suspect underlying dementia process.  Will obtain psychiatric consultation for help with determination of competency to make own medical decisions    Malnutrition--nutrition consult pending    Gait instability at home--PT consult    Possible nursing home  placement-- consultation.  Again will await psych evaluation for evaluation of competency to make own decisions.    VTE Prophylaxis:   Mechanical Order History:     None      Pharmalogical Order History:     Ordered     Dose Route Frequency Stop    08/04/19 0042  heparin (porcine) 5000 UNIT/ML injection 5,000 Units      5,000 Units SC Every 12 Hours Scheduled --              Seth Vargas MD  [facility] Hospitalist  08/04/19  11:22 AM    Electronically signed by Seth Vargas MD at 8/4/2019 11:26 AM       Medical Student Notes (most recent note)     No notes of this type exist for this encounter.           Consult Notes (most recent note)      Mirza Avila MD at 8/4/2019  3:00 PM      Consult Orders    1. Inpatient Psychiatrist Consult [[id number]] ordered by Seth Vargas MD at 08/04/19 1121                    Referring Provider: Dr. Seth Vargas  Reason for Consultation: Decision-making capacity      Chief complaint/Focus of Exam: Confusion    Subjective .     History of present illness: The patient is a 89-year-old female who was brought to the hospital due to recent confusion and falls at home.  Psychiatric consult is done to evaluate her for decision making capacity.  The patient was seen in her room where she is lying comfortably in her bed.  She is hard of hearing.  All she is oriented to his person.  She does not know where she is, she does not know today's date, and she does not know why she is here.  She is not able to provide a coherent history and tends to confabulate. Noted patient's sister's report in H&P that has had episodes of confusion and memory difficulties.    Past Psych History: Pt is not able to provide any details.      Review of Systems  Pt is not able to provide a coherent ROS of systems, though she denied any pain or discomfort.    History  History reviewed. No pertinent past medical history., History reviewed. No pertinent surgical history.,   Family History   Family  history unknown: Yes   ,   Social History     Tobacco Use   • Smoking status: Never Smoker   Substance Use Topics   • Alcohol use: No   • Drug use: No   ,   No medications prior to admission.   , Scheduled Meds:    heparin (porcine) 5,000 Units Subcutaneous Q12H   sodium chloride 3 mL Intravenous Q12H   , Continuous Infusions:    sodium chloride 50 mL/hr Last Rate: 50 mL/hr (08/04/19 0828)   , PRN Meds:  melatonin  •  nitroglycerin  •  sodium chloride  •  [COMPLETED] Insert peripheral IV **AND** sodium chloride  •  sodium chloride and Allergies:  Patient has no known allergies.    Objective     Vital Signs   Temp:  [96.9 °F (36.1 °C)-98.3 °F (36.8 °C)] 98.3 °F (36.8 °C)  Heart Rate:  [68-86] 86  Resp:  [16-20] 18  BP: (123-196)/() 181/80    Mental Status Exam:   Mental Status Exam:    Hygiene:   fair  Cooperation:  Cooperative  Eye Contact:  Fair  Psychomotor Behavior:  Appropriate  Affect:  Restricted  Hopelessness: Denies  Speech:  Minimal  Thought Progress:  Disorganized  Thought Content:  Unable to demonstrate  Suicidal:  None  Homicidal:  None  Hallucinations:  None  Delusion:  Unable to demonstrate  Memory:  Unable to evaluate  Orientation:  Person  Reliability:  poor  Insight:  Poor  Judgement:  Poor  Impulse Control:  Fair    Results Review:   I reviewed the patient's new clinical results.  Lab Results (last 24 hours)     Procedure Component Value Units Date/Time    Troponin [[id number]]  (Normal) Collected:  08/04/19 1156    Specimen:  Blood Updated:  08/04/19 1232     Troponin T <0.010 ng/mL     Narrative:       Troponin T Reference Range:  <= 0.03 ng/mL-   Negative for AMI  >0.03 ng/mL-     Abnormal for myocardial necrosis.  Clinicians would have to utilize clinical acumen, EKG, Troponin and serial changes to determine if it is an Acute Myocardial Infarction or myocardial injury due to an underlying chronic condition.     Vitamin B12 [[id number]]  (Normal) Collected:  08/04/19 0554    Specimen:  Blood  Updated:  08/04/19 1225     Vitamin B-12 473 pg/mL     Folate [[id number]]  (Normal) Collected:  08/04/19 0554    Specimen:  Blood Updated:  08/04/19 1225     Folate 16.50 ng/mL     Urinalysis With Microscopic If Indicated (No Culture) - Urine, Clean Catch [[id number]]  (Abnormal) Collected:  08/04/19 0619    Specimen:  Urine, Clean Catch Updated:  08/04/19 0633     Color, UA Yellow     Appearance, UA Clear     pH, UA <=5.0     Specific Gravity, UA 1.018     Glucose, UA Negative     Ketones, UA 40 mg/dL (2+)     Bilirubin, UA Negative     Blood, UA Negative     Protein, UA Trace     Leuk Esterase, UA Trace     Nitrite, UA Negative     Urobilinogen, UA 1.0 E.U./dL    Urinalysis, Microscopic Only - Urine, Clean Catch [[id number]]  (Abnormal) Collected:  08/04/19 0619    Specimen:  Urine, Clean Catch Updated:  08/04/19 0633     RBC, UA 0-2 /HPF      WBC, UA 6-12 /HPF      Bacteria, UA None Seen /HPF      Squamous Epithelial Cells, UA 0-2 /HPF      Hyaline Casts, UA 3-6 /LPF      Methodology Automated Microscopy    Troponin [[id number]]  (Normal) Collected:  08/04/19 0554    Specimen:  Blood Updated:  08/04/19 0630     Troponin T <0.010 ng/mL     Narrative:       Troponin T Reference Range:  <= 0.03 ng/mL-   Negative for AMI  >0.03 ng/mL-     Abnormal for myocardial necrosis.  Clinicians would have to utilize clinical acumen, EKG, Troponin and serial changes to determine if it is an Acute Myocardial Infarction or myocardial injury due to an underlying chronic condition.     TSH [[id number]]  (Normal) Collected:  08/04/19 0554    Specimen:  Blood Updated:  08/04/19 0630     TSH 2.410 mIU/mL     Comprehensive Metabolic Panel [[id number]]  (Abnormal) Collected:  08/04/19 0109    Specimen:  Blood Updated:  08/04/19 0151     Glucose 94 mg/dL      BUN 36 mg/dL      Creatinine 1.29 mg/dL      Sodium 142 mmol/L      Potassium 4.1 mmol/L      Chloride 106 mmol/L      CO2 21.6 mmol/L      Calcium 9.3 mg/dL      Total Protein 7.1 g/dL       Albumin 3.47 g/dL      ALT (SGPT) 16 U/L      AST (SGOT) 23 U/L      Alkaline Phosphatase 77 U/L      Total Bilirubin 0.4 mg/dL      eGFR Non African Amer 39 mL/min/1.73      Globulin 3.6 gm/dL      A/G Ratio 1.0 g/dL      BUN/Creatinine Ratio 27.9     Anion Gap 14.4 mmol/L     Narrative:       GFR Normal >60  Chronic Kidney Disease <60  Kidney Failure <15    Troponin [[id number]]  (Normal) Collected:  08/04/19 0109    Specimen:  Blood Updated:  08/04/19 0151     Troponin T <0.010 ng/mL     Narrative:       Troponin T Reference Range:  <= 0.03 ng/mL-   Negative for AMI  >0.03 ng/mL-     Abnormal for myocardial necrosis.  Clinicians would have to utilize clinical acumen, EKG, Troponin and serial changes to determine if it is an Acute Myocardial Infarction or myocardial injury due to an underlying chronic condition.     CBC & Differential [[id number]] Collected:  08/04/19 0109    Specimen:  Blood Updated:  08/04/19 0124    Narrative:       The following orders were created for panel order CBC & Differential.  Procedure                               Abnormality         Status                     ---------                               -----------         ------                     CBC Auto Differential[[id number]]        Abnormal            Final result                 Please view results for these tests on the individual orders.    CBC Auto Differential [[id number]]  (Abnormal) Collected:  08/04/19 0109    Specimen:  Blood Updated:  08/04/19 0124     WBC 9.46 10*3/mm3      RBC 4.33 10*6/mm3      Hemoglobin 12.8 g/dL      Hematocrit 40.7 %      MCV 94.0 fL      MCH 29.6 pg      MCHC 31.4 g/dL      RDW 15.0 %      RDW-SD 49.4 fl      MPV 9.6 fL      Platelets 242 10*3/mm3      Neutrophil % 66.0 %      Lymphocyte % 16.1 %      Monocyte % 8.8 %      Eosinophil % 8.8 %      Basophil % 0.2 %      Immature Grans % 0.1 %      Neutrophils, Absolute 6.25 10*3/mm3      Lymphocytes, Absolute 1.52 10*3/mm3      Monocytes, Absolute  0.83 10*3/mm3      Eosinophils, Absolute 0.83 10*3/mm3      Basophils, Absolute 0.02 10*3/mm3      Immature Grans, Absolute 0.01 10*3/mm3     [facility] Draw [[id number]] Collected:  08/03/19 1909    Specimen:  Blood Updated:  08/03/19 2015    Narrative:       The following orders were created for panel order [facility] Draw.  Procedure                               Abnormality         Status                     ---------                               -----------         ------                     Light Blue Top[[id number]]                                   Final result               Green Top (Gel)[[id number]]                                  Final result               Lavender Top[[id number]]                                     Final result               Gold Top - SST[[id number]]                                   Final result                 Please view results for these tests on the individual orders.    Light Blue Top [[id number]] Collected:  08/03/19 1909    Specimen:  Blood Updated:  08/03/19 2015     Extra Tube hold for add-on     Comment: Auto resulted       Green Top (Gel) [[id number]] Collected:  08/03/19 1909    Specimen:  Blood Updated:  08/03/19 2015     Extra Tube Hold for add-ons.     Comment: Auto resulted.       Gold Top - SST [[id number]] Collected:  08/03/19 1909    Specimen:  Blood Updated:  08/03/19 2015     Extra Tube Hold for add-ons.     Comment: Auto resulted.       Lavender Top [[id number]] Collected:  08/03/19 1909    Specimen:  Blood Updated:  08/03/19 2015     Extra Tube hold for add-on     Comment: Auto resulted       C-reactive Protein [[id number]]  (Normal) Collected:  08/03/19 1929    Specimen:  Blood Updated:  08/03/19 2000     C-Reactive Protein 0.28 mg/dL     Lactic Acid, Plasma [[id number]]  (Normal) Collected:  08/03/19 1929    Specimen:  Blood Updated:  08/03/19 1957     Lactate 1.4 mmol/L     Comprehensive Metabolic Panel [[id number]]  (Abnormal) Collected:  08/03/19 1909    Specimen:  Blood  Updated:  08/03/19 1939     Glucose 95 mg/dL      BUN 38 mg/dL      Creatinine 1.44 mg/dL      Sodium 143 mmol/L      Potassium 4.3 mmol/L      Chloride 102 mmol/L      CO2 23.5 mmol/L      Calcium 9.5 mg/dL      Total Protein 7.6 g/dL      Albumin 3.89 g/dL      ALT (SGPT) 18 U/L      AST (SGOT) 26 U/L      Alkaline Phosphatase 84 U/L      Total Bilirubin 0.4 mg/dL      eGFR Non African Amer 34 mL/min/1.73      Globulin 3.7 gm/dL      A/G Ratio 1.0 g/dL      BUN/Creatinine Ratio 26.4     Anion Gap 17.5 mmol/L     Narrative:       GFR Normal >60  Chronic Kidney Disease <60  Kidney Failure <15    Troponin [[id number]]  (Normal) Collected:  08/03/19 1909    Specimen:  Blood Updated:  08/03/19 1939     Troponin T <0.010 ng/mL     Narrative:       Troponin T Reference Range:  <= 0.03 ng/mL-   Negative for AMI  >0.03 ng/mL-     Abnormal for myocardial necrosis.  Clinicians would have to utilize clinical acumen, EKG, Troponin and serial changes to determine if it is an Acute Myocardial Infarction or myocardial injury due to an underlying chronic condition.     BNP [[id number]]  (Abnormal) Collected:  08/03/19 1909    Specimen:  Blood Updated:  08/03/19 1937     proBNP 1,929.0 pg/mL     Narrative:       Among patients with dyspnea, NT-proBNP is highly sensitive for the detection of acute congestive heart failure. In addition NT-proBNP of <300 pg/ml effectively rules out acute congestive heart failure with 99% negative predictive value.    Blood Culture - Blood, Arm, Right [[id number]] Collected:  08/03/19 1928    Specimen:  Blood from Arm, Right Updated:  08/03/19 1935    Blood Culture - Blood, Arm, Left [[id number]] Collected:  08/03/19 1931    Specimen:  Blood from Arm, Left Updated:  08/03/19 1935    Blood Gas, Arterial [[id number]]  (Abnormal) Collected:  08/03/19 1923    Specimen:  Arterial Blood Updated:  08/03/19 1925     Site Arterial: right brachial     Umang's Test N/A     pH, Arterial 7.421 pH units      pCO2,  Arterial 33.4 mm Hg      pO2, Arterial 76.7 mm Hg      HCO3, Arterial 21.2 mmol/L      Base Excess, Arterial -2.4 mmol/L      O2 Saturation, Arterial 95.2 %      Hemoglobin, Blood Gas 13.5 g/dL      Hematocrit, Blood Gas 40.0 %      Oxyhemoglobin 93.6 %      Methemoglobin 0.20 %      Carboxyhemoglobin 1.5 %      A-a Gradiant 26.5 mmHg      Temperature 98.6 C      Barometric Pressure for Blood Gas 729 mmHg      Modality Room Air     FIO2 21 %     CBC & Differential [[id number]] Collected:  08/03/19 1909    Specimen:  Blood Updated:  08/03/19 1919    Narrative:       The following orders were created for panel order CBC & Differential.  Procedure                               Abnormality         Status                     ---------                               -----------         ------                     CBC Auto Differential[[id number]]        Abnormal            Final result                 Please view results for these tests on the individual orders.    CBC Auto Differential [[id number]]  (Abnormal) Collected:  08/03/19 1909    Specimen:  Blood Updated:  08/03/19 1919     WBC 7.17 10*3/mm3      RBC 4.52 10*6/mm3      Hemoglobin 13.4 g/dL      Hematocrit 42.1 %      MCV 93.1 fL      MCH 29.6 pg      MCHC 31.8 g/dL      RDW 15.0 %      RDW-SD 49.2 fl      MPV 9.3 fL      Platelets 233 10*3/mm3      Neutrophil % 68.4 %      Lymphocyte % 19.4 %      Monocyte % 5.9 %      Eosinophil % 6.1 %      Basophil % 0.1 %      Immature Grans % 0.1 %      Neutrophils, Absolute 4.90 10*3/mm3      Lymphocytes, Absolute 1.39 10*3/mm3      Monocytes, Absolute 0.42 10*3/mm3      Eosinophils, Absolute 0.44 10*3/mm3      Basophils, Absolute 0.01 10*3/mm3      Immature Grans, Absolute 0.01 10*3/mm3         Imaging Results (last 24 hours)     Procedure Component Value Units Date/Time    CT Head Without Contrast [[id number]] Collected:  08/04/19 1243     Updated:  08/04/19 1247    Narrative:       EXAMINATION: CT HEAD WO CONTRAST-       CLINICAL INDICATION:     intermittent confusion; I16.1-Hypertensive  emergency; E86.0-Dehydration; I50.9-Heart failure, unspecified     COMPARISON:    02/21/2019     Technique: Multiple CT axial images were obtained through the level of  the brain without IV contrast administration. Reformatted images in the  coronal and/or sagittal plane(s) were generated from the axial data set  to facilitate diagnostic accuracy and/or surgical planning.     Radiation dose reduction techniques were utilized per ALARA protocol.  Automated exposure control was initiated through either or [employer] or  [employer] software packages by  protocol.       DOSE (DLP mGy-cm):     FINDINGS:     Brain: Unremarkable. No parenchymal hemorrhage or mass. No white matter  abnormality. No areas of mass effect. Mild generalized cerebral atrophy  and moderate-advanced chronic small vessel ischemic disease.  Ventricles: Unremarkable. No hydrocephalus.  Extra-axial spaces: Unremarkable. No extra-axial hemorrhage. No  extra-axial masses.  Bones: Unremarkable. No acute fracture identified.  Sinuses: Mild chronic ethmoid sinus disease.  Mastoids: Unremarkable. No mastoid effusions.  Soft Tissues: Unremarkable.          Impression:       1. No CT evidence of acute intracranial abnormality.  2. Stable atrophy and moderate-advanced chronic small vessel ischemic  disease.  3. Mild chronic ethmoid sinus disease.     This report was finalized on 8/4/2019 12:45 PM by Dr. Neno Verdugo MD.       XR Chest AP [[id number]] Collected:  08/03/19 2015     Updated:  08/03/19 2017    Narrative:       CR Chest 1 Vw    INDICATION:   9-year-old female with shortness of air and cough today.     COMPARISON:    Chest x-ray 2/21/2019  CT chest 2/21/2019    FINDINGS:  Single portable AP view of the chest.  Stable mild cardiomegaly. Mediastinal contours are normal. The lungs are clear. No pneumothorax or pleural effusion. Advanced right glenohumeral arthrosis. Old,  healed right-sided rib fracture.      Impression:       Stable mild cardiomegaly. No other acute chest findings.    Signer Name: Jose A Hilario MD   Signed: 8/3/2019 8:15 PM   Workstation Name: [email or web address]-    [facility]            Assessment/Plan     Active Problems:    Hypertensive emergency     Dementia  The patient lacks capacity to make informed decisions about her medical care and disposition and would need emergency guardianship.    I discussed the patients findings and my recommendations with nursing staff    Mirza Avila MD  08/04/19  3:01 PM          Electronically signed by Mirza Avila MD at 8/4/2019  3:13 PM

## 2019-08-05 NOTE — PROGRESS NOTES
Discharge Planning Assessment   [facility]     Patient Name: Lolis Infante  MRN: [id number]  Today's Date: 8/5/2019    Admit Date: 8/3/2019    Discharge Needs Assessment     Row Name 08/05/19 1124       Living Environment    Lives With  alone    Current Living Arrangements  home/apartment/condo    Primary Care Provided by  self;child(ashley)    Provides Primary Care For  no one, unable/limited ability to care for self    Family Caregiver if Needed  child(ashley), adult    Quality of Family Relationships  involved    Able to Return to Prior Arrangements  yes       Transition Planning    Patient/Family Anticipates Transition to  inpatient rehabilitation facility    Patient/Family Anticipated Services at Transition  skilled nursing       Discharge Needs Assessment    Equipment Currently Used at Home  none    Equipment Needed After Discharge  none        Discharge Plan     Row Name 08/05/19 1125       Plan    Plan  SS spoke with pt's sister Teresa on this day. Pt lives at home with no  services. Pt uses No DME at home. Pt does not have a POA or a living will. Pt does not have a PCP, and she does not use a pharmacy because she does not take her medications according to Teresa. Pt's family plans to speak with SS this afternoon about gaurdianship and nursing home placement for pt. SS will follow and and assist with discharge planning.     Patient/Family in Agreement with Plan  yes               Demographic Summary     Row Name 08/05/19 1123       General Information    Admission Type  inpatient    Referral Source  nursing    Reason for Consult  discharge planning    Preferred Language  English     Used During This Interaction  no                EDWARD Castañeda

## 2019-08-05 NOTE — PROGRESS NOTES
Discharge Planning Assessment  [facility]     Patient Name: Lolis Infante  MRN: [id number]  Today's Date: 8/5/2019    Admit Date: 8/3/2019      Discharge Plan     Row Name 08/05/19 1631       Plan    Plan  Pt's family states that [facility] and [facility] are too far for the family to travel. Savanah at [facility] states that she may be able to take pt tomorrow if medically stable.     Patient/Family in Agreement with Plan  yes    Row Name 08/05/19 1349       Plan    Plan  SS spoke with pt's amily memebers on this day. Pt's family is planning to obtain emergency gaurdiansip, in order to acheive nursing home placement. SS contacted the [facility] who states that they have no beds. SS contacted Savanah at [facility] [place], Denisse at [facility], and Elvia at [facility]. SS sent pt's referral to [facility], [facility], and [place]. SS will follow and assist with discharge planning.     Patient/Family in Agreement with Plan  yes        Destination      Service Provider Request Status Selected Services Address Phone Number Fax Number    [facility] Pending - Request Sent N/A [address] [facility] [address] [phone]    [facility] Pending - Request Sent N/A [address] [address] [phone]    [facility] Pending - Request Sent N/A [address] [facility] [address] [phone]        EDWARD Castañeda

## 2019-08-05 NOTE — THERAPY EVALUATION
Acute Care - Physical Therapy Initial Evaluation/Treatment Note  ROZINA Jeffries     Patient Name: Lolis Infante  : 1929  MRN: [id number]  Today's Date: 2019   Onset of Illness/Injury or Date of Surgery: 19(admit date)  Date of Referral to PT: 19  Referring Physician: Solange      Admit Date: 8/3/2019    Visit Dx:     ICD-10-CM ICD-9-CM   1. Hypertensive emergency I16.1 401.9   2. Dehydration E86.0 276.51   3. Congestive heart failure, unspecified HF chronicity, unspecified heart failure type (CMS/[facility]) I50.9 428.0     Patient Active Problem List   Diagnosis   • Hypertensive emergency     History reviewed. No pertinent past medical history.  History reviewed. No pertinent surgical history.     PT ASSESSMENT (last 12 hours)      Physical Therapy Evaluation     Row Name 19 1456          PT Evaluation Time/Intention    Subjective Information  no complaints  -CT     Document Type  evaluation;therapy note (daily note)  -CT     Mode of Treatment  individual therapy;physical therapy  -CT     Patient Effort  fair  -CT     Symptoms Noted During/After Treatment  fatigue  -CT     Comment  Pt tolerated evaluation and treatment session fairly well with rest breaks provided as needed. Pt has difficulty following directions to fully participate with therapy. Pt has loss of balance episodes during ambulation. Pt was living at home independently prior to admission, however will need skilled nursing home placement at d/c.   -CT     Row Name 19 1456          General Information    Patient Profile Reviewed?  yes  -CT     Onset of Illness/Injury or Date of Surgery  19 admit date  -CT     Referring Physician  Solange  -CT     Patient Observations  alert;cooperative;agree to therapy  -CT     Prior Level of Function  independent:  -CT     Equipment Currently Used at Home  none  -CT     Pertinent History of Current Functional Problem  history of falls  -CT     Existing Precautions/Restrictions  fall  -CT      Equipment Issued to Patient  gait belt  -CT     Risks Reviewed  patient:;family:;nausea/vomiting;LOB;dizziness;change in vital signs;increased discomfort;increased drainage;lines disloged  -CT     Benefits Reviewed  patient:;family:;improve function;increase independence;increase strength;increase balance;decrease pain;decrease risk of DVT;improve skin integrity;increase knowledge  -CT     Barriers to Rehab  physical barrier  -CT     Row Name 08/05/19 1456          Relationship/Environment    Primary Source of Support/Comfort  sibling(s)  -CT     Lives With  alone  -CT     Family Caregiver if Needed  child(ashley), adult  -CT     Row Name 08/05/19 1456          Resource/Environmental Concerns    Current Living Arrangements  home/apartment/condo  -CT     Row Name 08/05/19 1456          Cognitive Assessment/Intervention- PT/OT    Orientation Status (Cognition)  oriented to;person  -CT     Follows Commands (Cognition)  follows one step commands;0-24% accuracy;delayed response/completion;physical/tactile prompts required  -CT     Row Name 08/05/19 1456          Safety Issues, Functional Mobility    Safety Issues Affecting Function (Mobility)  awareness of need for assistance;judgment;safety precaution awareness  -CT     Row Name 08/05/19 1456          Bed Mobility Assessment/Treatment    Bed Mobility Assessment/Treatment  bed mobility (all) activities  -CT     [place] Level (Bed Mobility)  supervision  -CT     Bed Mobility, Safety Issues  decreased use of arms for pushing/pulling;decreased use of legs for bridging/pushing  -CT     Assistive Device (Bed Mobility)  bed rails  -CT     Row Name 08/05/19 1456          Transfer Assessment/Treatment    Transfer Assessment/Treatment  sit-stand transfer;stand-sit transfer  -CT     Sit-Stand [place] (Transfers)  contact guard  -CT     Stand-Sit [place] (Transfers)  contact guard  -CT     Row Name 08/05/19 1456          Gait/Stairs Assessment/Training    [place]  Level (Gait)  minimum assist (75% patient effort)  -CT     Assistive Device (Gait)  walker, front-wheeled  -CT     Distance in Feet (Gait)  80  -CT     Pattern (Gait)  step-through  -CT     Deviations/Abnormal Patterns (Gait)  gait speed decreased;base of support, narrow  -CT     Bilateral Gait Deviations  forward flexed posture;weight shift ability decreased  -CT     Row Name 08/05/19 1456          General ROM    GENERAL ROM COMMENTS  pt unable to follow commands for formal testing, however appears functional   -CT     Row Name 08/05/19 1456          MMT (Manual Muscle Testing)    General MMT Comments  pt unable to follow commands for formal testing, however appears functional   -CT     Row Name 08/05/19 1456          Motor Assessment/Intervention    Additional Documentation  Balance (Group)  -CT     Row Name 08/05/19 1456          Balance    Balance  dynamic standing balance  -CT     Row Name 08/05/19 1456          Dynamic Standing Balance    Level of [place], Reaches Outside Midline (Standing, Dynamic Balance)  moderate assist, 50 to 74% patient effort gait balance  -CT     Row Name 08/05/19 1456          Pain Assessment    Additional Documentation  -- no pain reported  -CT     Row Name 08/05/19 1456          Plan of Care Review    Plan of Care Reviewed With  patient  -CT     Row Name 08/05/19 1456          Physical Therapy Clinical Impression    Date of Referral to PT  08/04/19  -CT     PT Diagnosis (PT Clinical Impression)  impaired functional mobility  -CT     Prognosis (PT Clinical Impression)  good  -CT     Functional Level at Time of Evaluation (PT Clinical Impression)  Min/CGA  -CT     Patient/Family Goals Statement (PT Clinical Impression)  Family goals are for pt to be admitted into SNF  -CT     Criteria for Skilled Interventions Met (PT Clinical Impression)  yes;treatment indicated  -CT     Pathology/Pathophysiology Noted (Describe Specifically for Each System)  musculoskeletal;neuromuscular  -CT      Impairments Found (describe specific impairments)  aerobic capacity/endurance;gait, locomotion, and balance  -CT     Functional Limitations in Following Categories (Describe Specific Limitations)  self-care;home management  -CT     Rehab Potential (PT Clinical Summary)  good, to achieve stated therapy goals  -CT     Predicted Duration of Therapy (PT)  length of stay  -CT     Care Plan Review (PT)  evaluation/treatment results reviewed;care plan/treatment goals reviewed;risks/benefits reviewed;current/potential barriers reviewed;patient/other agree to care plan  -CT     Care Plan Review, Other Participant (PT Clinical Impression)  sibling  -CT     Row Name 08/05/19 1456          Physical Therapy Goals    Bed Mobility Goal Selection (PT)  bed mobility, PT goal 1  -CT     Transfer Goal Selection (PT)  transfer, PT goal 1  -CT     Gait Training Goal Selection (PT)  gait training, PT goal 1  -CT     Row Name 08/05/19 145          Bed Mobility Goal 1 (PT)    Activity/Assistive Device (Bed Mobility Goal 1, PT)  bed mobility activities, all  -CT     [place] Level/Cues Needed (Bed Mobility Goal 1, PT)  supervision required  -CT     Time Frame (Bed Mobility Goal 1, PT)  by discharge  -CT     Row Name 08/05/19 1455          Transfer Goal 1 (PT)    Activity/Assistive Device (Transfer Goal 1, PT)  sit-to-stand/stand-to-sit;bed-to-chair/chair-to-bed  -CT     [place] Level/Cues Needed (Transfer Goal 1, PT)  supervision required  -CT     Time Frame (Transfer Goal 1, PT)  by discharge  -CT     Row Name 08/05/19 1453          Gait Training Goal 1 (PT)    Activity/Assistive Device (Gait Training Goal 1, PT)  gait (walking locomotion);assistive device use  -CT     [place] Level (Gait Training Goal 1, PT)  supervision required  -CT     Time Frame (Gait Training Goal 1, PT)  by discharge  -CT     Row Name 08/05/19 1454          Positioning and Restraints    Pre-Treatment Position  in bed  -CT     Post Treatment  Position  bed  -CT     In Bed  supine;call light within reach;encouraged to call for assist;exit alarm on;with family/caregiver  -CT       User Key  (r) = Recorded By, (t) = Taken By, (c) = Cosigned By    Initials Name Provider Type    CT Lima Bob PT Physical Therapist        Physical Therapy Education     Title: PT OT SLP Therapies (In Progress)     Topic: Physical Therapy (In Progress)     Point: Mobility training (In Progress)     Learning Progress Summary           Patient Acceptance, E,TB, NL,NR by CT at 8/5/2019  3:09 PM                   Point: Home exercise program (In Progress)     Learning Progress Summary           Patient Acceptance, E,TB, NL,NR by CT at 8/5/2019  3:09 PM                   Point: Body mechanics (In Progress)     Learning Progress Summary           Patient Acceptance, E,TB, NL,NR by CT at 8/5/2019  3:09 PM                   Point: Precautions (In Progress)     Learning Progress Summary           Patient Acceptance, E,TB, NL,NR by CT at 8/5/2019  3:09 PM                               User Key     Initials Effective Dates Name Provider Type Discipline    CT 04/03/18 -  Lima Bob, HANK Physical Therapist PT              PT Recommendation and Plan  Anticipated Discharge Disposition (PT): skilled nursing facility  Planned Therapy Interventions (PT Eval): balance training, bed mobility training, gait training, home exercise program, manual therapy techniques, motor coordination training, neuromuscular re-education, patient/family education, postural re-education, stair training, strengthening, transfer training  Therapy Frequency (PT Clinical Impression): 3 times/wk(3-5 times/wk as available)  Outcome Summary/Treatment Plan (PT)  Anticipated Equipment Needs at Discharge (PT): front wheeled walker  Anticipated Discharge Disposition (PT): skilled nursing facility  Plan of Care Reviewed With: patient     Time Calculation:   PT Charges     Row Name 08/05/19 6609             Time  Calculation    PT Received On  08/05/19  -CT      PT - Next Appointment  08/06/19  -CT      PT Goal Re-Cert Due Date  08/19/19  -CT         Time Calculation- PT    Total Timed Code Minutes- PT  10 minute(s)  -CT         Timed Charges    47761 - Gait Training Minutes   10  -CT        User Key  (r) = Recorded By, (t) = Taken By, (c) = Cosigned By    Initials Name Provider Type    CT Lima Bob, PT Physical Therapist        Therapy Charges for Today     Code Description Service Date Service Provider Modifiers Qty    97710607623 HC GAIT TRAINING EA 15 MIN 8/5/2019 Lima Bob, PT GP 1    65749140729 HC PT EVAL HIGH COMPLEXITY 4 8/5/2019 Lima Bob, PT GP 1                 Lima Bob PT  8/5/2019

## 2019-08-05 NOTE — PLAN OF CARE
Problem: Patient Care Overview  Goal: Plan of Care Review  Outcome: Ongoing (interventions implemented as appropriate)    Goal: Individualization and Mutuality  Outcome: Ongoing (interventions implemented as appropriate)    Goal: Discharge Needs Assessment  Outcome: Ongoing (interventions implemented as appropriate)    Goal: Interprofessional Rounds/Family Conf  Outcome: Ongoing (interventions implemented as appropriate)      Problem: Fall Risk (Adult)  Goal: Identify Related Risk Factors and Signs and Symptoms  Outcome: Ongoing (interventions implemented as appropriate)    Goal: Absence of Fall   08/04/19 2242   Fall Risk (Adult)   Absence of Fall making progress toward outcome       Problem: Hypertensive Disease/Crisis (Arterial) (Adult)  Goal: Signs and Symptoms of Listed Potential Problems Will be Absent, Minimized or Managed (Hypertensive Disease/Crisis)  Outcome: Ongoing (interventions implemented as appropriate)      Problem: Skin Injury Risk (Adult)  Goal: Identify Related Risk Factors and Signs and Symptoms  Outcome: Ongoing (interventions implemented as appropriate)    Goal: Skin Health and Integrity   08/04/19 2242   Skin Injury Risk (Adult)   Skin Health and Integrity making progress toward outcome

## 2019-08-06 LAB
ANION GAP SERPL CALCULATED.3IONS-SCNC: 12.8 MMOL/L (ref 5–15)
BUN BLD-MCNC: 28 MG/DL (ref 8–23)
BUN/CREAT SERPL: 23.1 (ref 7–25)
CALCIUM SPEC-SCNC: 9.2 MG/DL (ref 8.2–9.6)
CHLORIDE SERPL-SCNC: 107 MMOL/L (ref 98–107)
CO2 SERPL-SCNC: 23.2 MMOL/L (ref 22–29)
CREAT BLD-MCNC: 1.21 MG/DL (ref 0.57–1)
GFR SERPL CREATININE-BSD FRML MDRD: 42 ML/MIN/1.73
GLUCOSE BLD-MCNC: 94 MG/DL (ref 65–99)
POTASSIUM BLD-SCNC: 3.6 MMOL/L (ref 3.5–5.2)
SODIUM BLD-SCNC: 143 MMOL/L (ref 136–145)

## 2019-08-06 PROCEDURE — 99232 SBSQ HOSP IP/OBS MODERATE 35: CPT | Performed by: HOSPITALIST

## 2019-08-06 PROCEDURE — 25010000002 HEPARIN (PORCINE) PER 1000 UNITS: Performed by: INTERNAL MEDICINE

## 2019-08-06 PROCEDURE — 80048 BASIC METABOLIC PNL TOTAL CA: CPT | Performed by: HOSPITALIST

## 2019-08-06 RX ORDER — SODIUM CHLORIDE 9 MG/ML
100 INJECTION, SOLUTION INTRAVENOUS CONTINUOUS
Status: DISCONTINUED | OUTPATIENT
Start: 2019-08-06 | End: 2019-08-07

## 2019-08-06 RX ORDER — CHOLECALCIFEROL (VITAMIN D3) 125 MCG
5 CAPSULE ORAL NIGHTLY
Status: DISCONTINUED | OUTPATIENT
Start: 2019-08-06 | End: 2019-08-08 | Stop reason: HOSPADM

## 2019-08-06 RX ADMIN — Medication 5 MG: at 20:31

## 2019-08-06 RX ADMIN — SODIUM CHLORIDE 100 ML/HR: 9 INJECTION, SOLUTION INTRAVENOUS at 11:28

## 2019-08-06 RX ADMIN — SODIUM CHLORIDE, PRESERVATIVE FREE 3 ML: 5 INJECTION INTRAVENOUS at 09:30

## 2019-08-06 RX ADMIN — AMLODIPINE BESYLATE 5 MG: 5 TABLET ORAL at 12:36

## 2019-08-06 NOTE — PROGRESS NOTES
[facility] HOSPITALIST PROGRESS NOTE     Patient Identification:  Name:  Lolis Infante  Age:  89 y.o.  Sex:  female  :  1929  MRN:  [id number]  Visit Number:  [id number]  ROOM: [address] [place] [address]     Primary Care Provider:  Provider, No Known    Length of stay:  2    Subjective        Chief Compliant Follow up for the HTN    Patient seen and examined this morning with brother at the bedside.  Patient is sleepy and arousable but not cooperative with answering question or examination.  History very limited. very poor oral intake.  Blood pressure improving.  Afebrile and no events overnight.      Objective     Current Hospital Meds:  amLODIPine 5 mg Oral Q24H   heparin (porcine) 5,000 Units Subcutaneous Q12H   sodium chloride 3 mL Intravenous Q12H      ----------------------------------------------------------------------------------------------------------------------  Vital Signs:  Temp:  [97.1 °F (36.2 °C)-98.1 °F (36.7 °C)] 98 °F (36.7 °C)  Heart Rate:  [68-80] 80  Resp:  [18] 18  BP: (146-178)/() 146/90  SpO2:  [95 %-98 %] 97 %  on   ;   Device (Oxygen Therapy): room air  Body mass index is 15.86 kg/m².    Wt Readings from Last 3 Encounters:   19 36.8 kg (81 lb 3.2 oz)   19 45.4 kg (100 lb)   16 47.6 kg (105 lb)       Intake/Output Summary (Last 24 hours) at 2019 1781  Last data filed at 2019 1731  Gross per 24 hour   Intake 720 ml   Output --   Net 720 ml     Diet Soft Texture; Chopped  ----------------------------------------------------------------------------------------------------------------------  Physical exam:  General: Comfortable, Not in distress.  Cachectic  HENT:  Head:  Normocephalic and atraumatic.  Mouth:  Moist mucous membranes.    Eyes:  Conjunctivae and EOM are normal.  Pupils are equal, round, and reactive to light.  No scleral icterus.    Neck:  Neck supple.  No JVD present. Trachea midline.   Cardiovascular:  Normal rate, regular rhythm with no  murmur.  Pulmonary/Chest:  No respiratory distress, no wheezes, no crackles, with normal breath sounds and good air movement.  Abdomen:  Soft.  Bowel sounds are normal.  No distension and no tenderness. No organomegaly.   Musculoskeletal:  No edema, no tenderness, and no deformity.  No red or swollen joints anywhere.    Neurological:  Alert and oriented to person, place, and time.  No cranial nerve deficit. No focal deficits. No facial droop.  No slurred speech.   Skin:  Skin is warm and dry. No rash noted. No pallor.   Peripheral vascular:  pulses in all 4 extremities with no clubbing, no cyanosis, no edema.  Genitourinary: No ridley  ----------------------------------------------------------------------------------------------------------------------  ----------------------------------------------------------------------------------------------------------------------Results from last 7 days   Lab Units 08/05/19  0720 08/04/19  0109 08/03/19  1929 08/03/19  1909   CRP mg/dL  --   --  0.28  --    LACTATE mmol/L  --   --  1.4  --    WBC 10*3/mm3 8.38 9.46  --  7.17   HEMOGLOBIN g/dL 13.2 12.8  --  13.4   HEMATOCRIT % 42.0 40.7  --  42.1   MCV fL 93.5 94.0  --  93.1   MCHC g/dL 31.4* 31.4*  --  31.8   PLATELETS 10*3/mm3 237 242  --  233     Results from last 7 days   Lab Units 08/05/19  0720 08/04/19  0109 08/03/19  1909   SODIUM mmol/L 143 142 143   POTASSIUM mmol/L 3.7 4.1 4.3   MAGNESIUM mg/dL 2.2  --   --    CHLORIDE mmol/L 104 106 102   CO2 mmol/L 24.9 21.6* 23.5   BUN mg/dL 21 36* 38*   CREATININE mg/dL 1.04* 1.29* 1.44*   EGFR IF NONAFRICN AM mL/min/1.73 50* 39* 34*   CALCIUM mg/dL 9.0 9.3 9.5   GLUCOSE mg/dL 96 94 95   ALBUMIN g/dL  --  3.47* 3.89   BILIRUBIN mg/dL  --  0.4 0.4   ALK PHOS U/L  --  77 84   AST (SGOT) U/L  --  23 26   ALT (SGPT) U/L  --  16 18   Estimated Creatinine Clearance: 20.9 mL/min (A) (by C-G formula based on SCr of 1.04 mg/dL (H)).  Results from last 7 days   Lab Units 08/04/19  1156  08/04/19  0554 08/04/19  0109   TROPONIN T ng/mL <0.010 <0.010 <0.010     No results found for: HGBA1C, POCGLU  No results found for: AMMONIA    Results from last 7 days   Lab Units 08/04/19  0619   NITRITE UA  Negative   WBC UA /HPF 6-12*   BACTERIA UA /HPF None Seen   OLAYINKA FRANKEL UA /HPF 0-2     Blood Culture   Date Value Ref Range Status   08/03/2019 No growth at 2 days  Preliminary   08/03/2019 No growth at 2 days  Preliminary          I have personally looked at the labs and they are summarized above.  ----------------------------------------------------------------------------------------------------------------------  Imaging Results (last 24 hours)     ** No results found for the last 24 hours. **        I have personally reviewed the radiology images and read the final radiology report.    Assessment & Plan      Assessment:  HTN Urgency  TAYLER  Dementia  Severe malnutrition  Debility      Plan:  Continue with amlodipine for the HTN. BP improving.   Cr improved so will DC IVF. Monitor closely. Encourage oral intake.  Dementia, supportive care. Not on any medications.  Severe malnutrition, Nutrition consulted. On supplement.  Debility, PT/OT on board.    Social work consulted for discharge back to nursing home.    Discussed with the brother at the bedside and patient does not have any advanced directive or living will.  The brother and the sister will be  Next of kin.  Psychiatry evaluated the patient and the patient would need emergency guardianship.  Discussed with the brother regarding the CODE STATUS and he will discuss with the sister Teresa and inform us.    The patient is high risk due to the following diagnoses/reasons:  HTN Urgency, TAYLER, Dementia, Severe malnutrition, Debility    Iliana Flanagan MD  08/05/19  9:34 PM

## 2019-08-06 NOTE — PROGRESS NOTES
Discharge Planning Assessment  [facility]     Patient Name: Lolis Infante  MRN: [id number]  Today's Date: 8/6/2019    Admit Date: 8/3/2019    Discharge Plan     Row Name 08/06/19 1428       Plan    Plan Per Psych's note, pt needs emergency guardianship. SS contacted the [facility] who states to send Dr. Avila an email. SS emailed MD requesting the statement. SS spoke with [facility] per [place] who states they will have a bed available for pt tomorrow if pt is medically stable. SS will follow.      Michelle Kent

## 2019-08-06 NOTE — SIGNIFICANT NOTE
08/06/19 1409   Rehab Treatment   Reason Treatment Not Performed (Attempted PT on 2 occasions this date but patient was sound asleep on both occasions & was unable to arouse her)

## 2019-08-06 NOTE — NURSING NOTE
Pts. Family came to sit with her and when they left ask us to make sure to not bother her for the rest of the night if she is sleeping.

## 2019-08-06 NOTE — PLAN OF CARE
Problem: Patient Care Overview  Goal: Plan of Care Review  Outcome: Ongoing (interventions implemented as appropriate)    Goal: Individualization and Mutuality  Outcome: Ongoing (interventions implemented as appropriate)    Goal: Discharge Needs Assessment  Outcome: Ongoing (interventions implemented as appropriate)    Goal: Interprofessional Rounds/Family Conf  Outcome: Ongoing (interventions implemented as appropriate)      Problem: Fall Risk (Adult)  Goal: Absence of Fall   08/05/19 2326   Fall Risk (Adult)   Absence of Fall making progress toward outcome       Problem: Hypertensive Disease/Crisis (Arterial) (Adult)  Goal: Signs and Symptoms of Listed Potential Problems Will be Absent, Minimized or Managed (Hypertensive Disease/Crisis)  Outcome: Ongoing (interventions implemented as appropriate)      Problem: Skin Injury Risk (Adult)  Goal: Skin Health and Integrity   08/05/19 2326   Skin Injury Risk (Adult)   Skin Health and Integrity making progress toward outcome       Problem: Mobility, Physical Impaired (Adult)  Goal: Identify Related Risk Factors and Signs and Symptoms  Outcome: Ongoing (interventions implemented as appropriate)    Goal: Enhanced Mobility Skills   08/05/19 2326   Mobility, Physical Impaired (Adult)   Enhanced Mobility Skills making progress toward outcome     Goal: Enhanced Functional Ability   08/05/19 2326   Mobility, Physical Impaired (Adult)   Enhanced Functional Ability making progress toward outcome

## 2019-08-06 NOTE — PROGRESS NOTES
[facility] HOSPITALIST PROGRESS NOTE     Patient Identification:  Name:  Lolis Infante  Age:  89 y.o.  Sex:  female  :  1929  MRN:  [id number]  Visit Number:  [id number]  ROOM: [address] [place] [address]     Primary Care Provider:  Provider, No Known    Length of stay:  3    Subjective        Chief Compliant Follow up for the HTN    Patient seen and examined this morning with no family at the bedside.  Patient is sleepy and arousable but not cooperative with answering question or examination.  History very limited. very poor oral intake.   Last night was awake and restless.did not sleep. Blood pressure improving.  Afebrile and no events overnight. Did not work with therapy today.       Objective     Current Hospital Meds:    amLODIPine 5 mg Oral Q24H   heparin (porcine) 5,000 Units Subcutaneous Q12H   melatonin 5 mg Oral Nightly   sodium chloride 3 mL Intravenous Q12H       sodium chloride 100 mL/hr Last Rate: 100 mL/hr (19 1237)     ----------------------------------------------------------------------------------------------------------------------  Vital Signs:  Temp:  [96.7 °F (35.9 °C)-97.7 °F (36.5 °C)] 97.6 °F (36.4 °C)  Heart Rate:  [63-80] 63  Resp:  [18] 18  BP: (145-163)/(74-90) 145/79  SpO2:  [97 %] 97 %  on   ;   Device (Oxygen Therapy): room air  Body mass index is 15.86 kg/m².    Wt Readings from Last 3 Encounters:   19 36.8 kg (81 lb 3.2 oz)   19 45.4 kg (100 lb)   16 47.6 kg (105 lb)       Intake/Output Summary (Last 24 hours) at 2019 1640  Last data filed at 2019 1128  Gross per 24 hour   Intake 740 ml   Output --   Net 740 ml     Diet Soft Texture; Chopped  ----------------------------------------------------------------------------------------------------------------------  Physical exam:  General: Comfortable, Not in distress.  Cachectic  HENT:  Head:  Normocephalic and atraumatic.  Mouth:  Moist mucous membranes.    Eyes:  Conjunctivae and EOM are normal.   Pupils are equal, round, and reactive to light.  No scleral icterus.    Neck:  Neck supple.  No JVD present. Trachea midline.   Cardiovascular:  Normal rate, regular rhythm with no murmur.  Pulmonary/Chest:  No respiratory distress, no wheezes, no crackles, with normal breath sounds and good air movement.  Abdomen:  Soft.  Bowel sounds are normal.  No distension and no tenderness. No organomegaly.   Musculoskeletal:  No edema, no tenderness, and no deformity.  No red or swollen joints anywhere.    Neurological:  Alert and oriented to person, place, and time.  No cranial nerve deficit. No focal deficits. No facial droop.  No slurred speech.   Skin:  Skin is warm and dry. No rash noted. No pallor.   Peripheral vascular:  pulses in all 4 extremities with no clubbing, no cyanosis, no edema.  Genitourinary: No ridley  ----------------------------------------------------------------------------------------------------------------------  ----------------------------------------------------------------------------------------------------------------------  Results from last 7 days   Lab Units 08/05/19  0720 08/04/19  0109 08/03/19  1929 08/03/19  1909   CRP mg/dL  --   --  0.28  --    LACTATE mmol/L  --   --  1.4  --    WBC 10*3/mm3 8.38 9.46  --  7.17   HEMOGLOBIN g/dL 13.2 12.8  --  13.4   HEMATOCRIT % 42.0 40.7  --  42.1   MCV fL 93.5 94.0  --  93.1   MCHC g/dL 31.4* 31.4*  --  31.8   PLATELETS 10*3/mm3 237 242  --  233     Results from last 7 days   Lab Units 08/06/19  0832 08/05/19  0720 08/04/19  0109 08/03/19  1909   SODIUM mmol/L 143 143 142 143   POTASSIUM mmol/L 3.6 3.7 4.1 4.3   MAGNESIUM mg/dL  --  2.2  --   --    CHLORIDE mmol/L 107 104 106 102   CO2 mmol/L 23.2 24.9 21.6* 23.5   BUN mg/dL 28* 21 36* 38*   CREATININE mg/dL 1.21* 1.04* 1.29* 1.44*   EGFR IF NONAFRICN AM mL/min/1.73 42* 50* 39* 34*   CALCIUM mg/dL 9.2 9.0 9.3 9.5   GLUCOSE mg/dL 94 96 94 95   ALBUMIN g/dL  --   --  3.47* 3.89   BILIRUBIN mg/dL  --    --  0.4 0.4   ALK PHOS U/L  --   --  77 84   AST (SGOT) U/L  --   --  23 26   ALT (SGPT) U/L  --   --  16 18   Estimated Creatinine Clearance: 18 mL/min (A) (by C-G formula based on SCr of 1.21 mg/dL (H)).  Results from last 7 days   Lab Units 08/04/19  1156 08/04/19  0554 08/04/19  0109   TROPONIN T ng/mL <0.010 <0.010 <0.010     No results found for: HGBA1C, POCGLU  No results found for: AMMONIA    Results from last 7 days   Lab Units 08/04/19  0619   NITRITE UA  Negative   WBC UA /HPF 6-12*   BACTERIA UA /HPF None Seen   SQUAM EPITHEL UA /HPF 0-2     Blood Culture   Date Value Ref Range Status   08/03/2019 No growth at 2 days  Preliminary   08/03/2019 No growth at 2 days  Preliminary          I have personally looked at the labs and they are summarized above.  ----------------------------------------------------------------------------------------------------------------------  Imaging Results (last 24 hours)     ** No results found for the last 24 hours. **        I have personally reviewed the radiology images and read the final radiology report.    Assessment & Plan      Assessment:  HTN Urgency  TAYLER  Dementia  Severe malnutrition  Debility      Plan:  Continue with amlodipine for the HTN. BP improving.   Cr worsening so will do 500cc of IVF. Monitor closely. Encourage oral intake.   Dementia, supportive care. Not on any medications.  Severe malnutrition, Nutrition consulted. On supplement.  Debility, PT/OT on board.    Social work consulted for discharge back to nursing home.    Will consult palliative care.    The patient is high risk due to the following diagnoses/reasons:  HTN Urgency, TAYLER, Dementia, Severe malnutrition, Debility    Iliana Flanagan MD  08/06/19  4:40 PM

## 2019-08-06 NOTE — PLAN OF CARE
Problem: Patient Care Overview  Goal: Plan of Care Review  Outcome: Ongoing (interventions implemented as appropriate)    Goal: Individualization and Mutuality  Outcome: Ongoing (interventions implemented as appropriate)    Goal: Discharge Needs Assessment  Outcome: Ongoing (interventions implemented as appropriate)    Goal: Interprofessional Rounds/Family Conf  Outcome: Ongoing (interventions implemented as appropriate)      Problem: Fall Risk (Adult)  Goal: Absence of Fall  Outcome: Ongoing (interventions implemented as appropriate)      Problem: Hypertensive Disease/Crisis (Arterial) (Adult)  Goal: Signs and Symptoms of Listed Potential Problems Will be Absent, Minimized or Managed (Hypertensive Disease/Crisis)  Outcome: Ongoing (interventions implemented as appropriate)      Problem: Skin Injury Risk (Adult)  Goal: Skin Health and Integrity  Outcome: Ongoing (interventions implemented as appropriate)      Problem: Mobility, Physical Impaired (Adult)  Goal: Identify Related Risk Factors and Signs and Symptoms  Outcome: Ongoing (interventions implemented as appropriate)    Goal: Enhanced Mobility Skills  Outcome: Ongoing (interventions implemented as appropriate)    Goal: Enhanced Functional Ability  Outcome: Ongoing (interventions implemented as appropriate)

## 2019-08-07 LAB
ANION GAP SERPL CALCULATED.3IONS-SCNC: 11.8 MMOL/L (ref 5–15)
BUN BLD-MCNC: 34 MG/DL (ref 8–23)
BUN/CREAT SERPL: 27.4 (ref 7–25)
CALCIUM SPEC-SCNC: 8.9 MG/DL (ref 8.2–9.6)
CHLORIDE SERPL-SCNC: 109 MMOL/L (ref 98–107)
CO2 SERPL-SCNC: 23.2 MMOL/L (ref 22–29)
CREAT BLD-MCNC: 1.24 MG/DL (ref 0.57–1)
GFR SERPL CREATININE-BSD FRML MDRD: 41 ML/MIN/1.73
GLUCOSE BLD-MCNC: 110 MG/DL (ref 65–99)
POTASSIUM BLD-SCNC: 3.7 MMOL/L (ref 3.5–5.2)
SODIUM BLD-SCNC: 144 MMOL/L (ref 136–145)

## 2019-08-07 PROCEDURE — 25010000002 HEPARIN (PORCINE) PER 1000 UNITS: Performed by: INTERNAL MEDICINE

## 2019-08-07 PROCEDURE — 97116 GAIT TRAINING THERAPY: CPT

## 2019-08-07 PROCEDURE — 97530 THERAPEUTIC ACTIVITIES: CPT

## 2019-08-07 PROCEDURE — 97110 THERAPEUTIC EXERCISES: CPT

## 2019-08-07 PROCEDURE — 99232 SBSQ HOSP IP/OBS MODERATE 35: CPT | Performed by: HOSPITALIST

## 2019-08-07 PROCEDURE — 80048 BASIC METABOLIC PNL TOTAL CA: CPT | Performed by: HOSPITALIST

## 2019-08-07 PROCEDURE — 94799 UNLISTED PULMONARY SVC/PX: CPT

## 2019-08-07 RX ORDER — SODIUM CHLORIDE 450 MG/100ML
50 INJECTION, SOLUTION INTRAVENOUS ONCE
Status: COMPLETED | OUTPATIENT
Start: 2019-08-07 | End: 2019-08-07

## 2019-08-07 RX ADMIN — Medication 5 MG: at 19:58

## 2019-08-07 RX ADMIN — HEPARIN SODIUM 5000 UNITS: 5000 INJECTION INTRAVENOUS; SUBCUTANEOUS at 08:47

## 2019-08-07 RX ADMIN — SODIUM CHLORIDE 50 ML/HR: 4.5 INJECTION, SOLUTION INTRAVENOUS at 11:42

## 2019-08-07 RX ADMIN — SODIUM CHLORIDE, PRESERVATIVE FREE 3 ML: 5 INJECTION INTRAVENOUS at 08:48

## 2019-08-07 RX ADMIN — AMLODIPINE BESYLATE 5 MG: 5 TABLET ORAL at 08:48

## 2019-08-07 RX ADMIN — HEPARIN SODIUM 5000 UNITS: 5000 INJECTION INTRAVENOUS; SUBCUTANEOUS at 19:58

## 2019-08-07 RX ADMIN — HYDRALAZINE HYDROCHLORIDE 10 MG: 10 TABLET, FILM COATED ORAL at 05:37

## 2019-08-07 NOTE — PROGRESS NOTES
[facility] HOSPITALIST PROGRESS NOTE     Patient Identification:  Name:  Lolis Infante  Age:  89 y.o.  Sex:  female  :  1929  MRN:  [id number]  Visit Number:  [id number]  ROOM: [address] [place] [address]     Primary Care Provider:  Provider, No Known    Length of stay:  4    Subjective        Chief Compliant Follow up for the HTN    Patient seen and examined this morning with no family at the bedside.  Patient is doing much better. Slept well last night. Awake, ate well but pleasantly confused. Did not converse properly or followed the commands. History very limited. Blood pressure improving.  Afebrile and no events overnight.      Objective     Current Hospital Meds:    amLODIPine 5 mg Oral Q24H   heparin (porcine) 5,000 Units Subcutaneous Q12H   melatonin 5 mg Oral Nightly   sodium chloride 3 mL Intravenous Q12H        ----------------------------------------------------------------------------------------------------------------------  Vital Signs:  Temp:  [97.3 °F (36.3 °C)-97.7 °F (36.5 °C)] 97.6 °F (36.4 °C)  Heart Rate:  [63-66] 63  Resp:  [18] 18  BP: ()/(54-84) 159/84  SpO2:  [92 %-97 %] 95 %  on   ;   Device (Oxygen Therapy): room air  Body mass index is 16.01 kg/m².    Wt Readings from Last 3 Encounters:   19 37.2 kg (82 lb)   19 45.4 kg (100 lb)   16 47.6 kg (105 lb)       Intake/Output Summary (Last 24 hours) at 2019 1602  Last data filed at 2019 1142  Gross per 24 hour   Intake 1460 ml   Output --   Net 1460 ml     Diet Soft Texture; Chopped  ----------------------------------------------------------------------------------------------------------------------  Physical exam:  General: Comfortable, Not in distress.  Cachectic  HENT:  Head:  Normocephalic and atraumatic.  Mouth:  Moist mucous membranes.    Eyes:  Conjunctivae and EOM are normal.  Pupils are equal, round, and reactive to light.  No scleral icterus.    Neck:  Neck supple.  No JVD present. Trachea  midline.   Cardiovascular:  Normal rate, regular rhythm with no murmur.  Pulmonary/Chest:  No respiratory distress, no wheezes, no crackles, with normal breath sounds and good air movement.  Abdomen:  Soft.  Bowel sounds are normal.  No distension and no tenderness. No organomegaly.   Musculoskeletal:  No edema, no tenderness, and no deformity.  No red or swollen joints anywhere.    Neurological:  Alert and oriented to person, place, and time.  No cranial nerve deficit. No focal deficits. No facial droop.  No slurred speech.   Skin:  Skin is warm and dry. No rash noted. No pallor.   Peripheral vascular:  pulses in all 4 extremities with no clubbing, no cyanosis, no edema.  Genitourinary: No ridley  ----------------------------------------------------------------------------------------------------------------------  ----------------------------------------------------------------------------------------------------------------------  Results from last 7 days   Lab Units 08/05/19  0720 08/04/19  0109 08/03/19  1929 08/03/19  1909   CRP mg/dL  --   --  0.28  --    LACTATE mmol/L  --   --  1.4  --    WBC 10*3/mm3 8.38 9.46  --  7.17   HEMOGLOBIN g/dL 13.2 12.8  --  13.4   HEMATOCRIT % 42.0 40.7  --  42.1   MCV fL 93.5 94.0  --  93.1   MCHC g/dL 31.4* 31.4*  --  31.8   PLATELETS 10*3/mm3 237 242  --  233     Results from last 7 days   Lab Units 08/07/19  0319 08/06/19  0832 08/05/19  0720 08/04/19  0109 08/03/19  1909   SODIUM mmol/L 144 143 143 142 143   POTASSIUM mmol/L 3.7 3.6 3.7 4.1 4.3   MAGNESIUM mg/dL  --   --  2.2  --   --    CHLORIDE mmol/L 109* 107 104 106 102   CO2 mmol/L 23.2 23.2 24.9 21.6* 23.5   BUN mg/dL 34* 28* 21 36* 38*   CREATININE mg/dL 1.24* 1.21* 1.04* 1.29* 1.44*   EGFR IF NONAFRICN AM mL/min/1.73 41* 42* 50* 39* 34*   CALCIUM mg/dL 8.9 9.2 9.0 9.3 9.5   GLUCOSE mg/dL 110* 94 96 94 95   ALBUMIN g/dL  --   --   --  3.47* 3.89   BILIRUBIN mg/dL  --   --   --  0.4 0.4   ALK PHOS U/L  --   --   --   77 84   AST (SGOT) U/L  --   --   --  23 26   ALT (SGPT) U/L  --   --   --  16 18   Estimated Creatinine Clearance: 17.7 mL/min (A) (by C-G formula based on SCr of 1.24 mg/dL (H)).  Results from last 7 days   Lab Units 08/04/19  1156 08/04/19  0554 08/04/19  0109   TROPONIN T ng/mL <0.010 <0.010 <0.010     No results found for: HGBA1C, POCGLU  No results found for: AMMONIA    Results from last 7 days   Lab Units 08/04/19  0619   NITRITE UA  Negative   WBC UA /HPF 6-12*   BACTERIA UA /HPF None Seen   SQUAM EPITHEL UA /HPF 0-2     Blood Culture   Date Value Ref Range Status   08/03/2019 No growth at 2 days  Preliminary   08/03/2019 No growth at 2 days  Preliminary          I have personally looked at the labs and they are summarized above.  ----------------------------------------------------------------------------------------------------------------------  Imaging Results (last 24 hours)     ** No results found for the last 24 hours. **        I have personally reviewed the radiology images and read the final radiology report.    Assessment & Plan      Assessment:  HTN Urgency  TAYLER  Dementia  Severe malnutrition  Debility      Plan:  Continue with amlodipine for the HTN. BP stable.   Cr worsening so will start on 1/2 NS IVF. Monitor closely. Encourage oral intake.   Dementia, supportive care. Not on any medications.  Severe malnutrition, Nutrition consulted. On supplement.  Debility, PT/OT on board.    Social work consulted for discharge back to nursing home. Will dc tomorrow.     palliative care consulted for the goals of care.    The patient is high risk due to the following diagnoses/reasons:  HTN Urgency, TAYLER, Dementia, Severe malnutrition, Debility    Iliana Flanagan MD  08/07/19  4:02 PM

## 2019-08-07 NOTE — CONSULTS
"Provider, No Known  Consulting physician: Dr. Flanagan  Reason for referral Goals of Care   Chief Complaint   Patient presents with   • Cough   • Back Pain       HPI: 89 year old female patient, presented to [facility] for falls and cough/congestion.  Patient reportedly has intermittent confusion, and lives alone.  She has no children per notes, and depends on siblings.  She was found to have high blood pressure, 196/117 with need for intervention.  She was admitted to telemetry for evaluation.  We have been consulted for Goals of Care and to assist patient and family.     Subjective:   Patient drinking some water this evening.  She is able to answer simple questions.  Denies pain, denies SOB.  She answers she is \"in the hospital\" when I ask if she knows where she is.  She does need redirection to stay on task with answering me.  No signs of distress.  No family at bedside.     Advance care planning discussed: No family present   Code Status:   Current Code Status     Date Active Code Status Order ID Comments User Context       8/3/2019 22:57 CPR [id number]  Lynda Narvaez DO ED       Questions for Current Code Status     Question Answer Comment    Code Status CPR     Medical Interventions (Level of Support Prior to Arrest) Full         Advance Directive: None on chart   Surrogate decision maker: To be determined   History reviewed. No pertinent past medical history.  History reviewed. No pertinent surgical history.    Reviewed current scheduled and prn medications for route, type, dose and frequency.    Current Facility-Administered Medications   Medication Dose Route Frequency Provider Last Rate Last Dose   • amLODIPine (NORVASC) tablet 5 mg  5 mg Oral Q24H Seth Vargas MD   5 mg at 08/07/19 0848   • heparin (porcine) 5000 UNIT/ML injection 5,000 Units  5,000 Units Subcutaneous Q12H Lynda Narvaez DO   5,000 Units at 08/07/19 0847   • hydrALAZINE (APRESOLINE) tablet 10 mg  10 mg Oral Q6H " "PRN Iliana Flanagan MD   10 mg at 08/07/19 0537   • melatonin tablet 5 mg  5 mg Oral Nightly Iliana Flanagan MD   5 mg at 08/06/19 2031   • nitroglycerin (NITROSTAT) SL tablet 0.4 mg  0.4 mg Sublingual Q5 Min PRN Lynda Narvaez DO       • sodium chloride 0.9 % flush 10 mL  10 mL Intravenous PRN Asha Dillon DO       • sodium chloride 0.9 % flush 10 mL  10 mL Intravenous PRN David Rodríguez APRN       • sodium chloride 0.9 % flush 3 mL  3 mL Intravenous Q12H Lynda Narvaez DO   3 mL at 08/07/19 0848   • sodium chloride 0.9 % flush 3-10 mL  3-10 mL Intravenous PRN Lynda Narvaez DO            hydrALAZINE  •  nitroglycerin  •  sodium chloride  •  [COMPLETED] Insert peripheral IV **AND** sodium chloride  •  sodium chloride  No Known Allergies  Family History   Family history unknown: Yes     Social History     Socioeconomic History   • Marital status:      Spouse name: Not on file   • Number of children: Not on file   • Years of education: Not on file   • Highest education level: Not on file   Tobacco Use   • Smoking status: Never Smoker   Substance and Sexual Activity   • Alcohol use: No   • Drug use: No   • Sexual activity: Defer       Review of Systems - +/- per HPI and symptom review.      PPS: 30  /81 (BP Location: Right arm, Patient Position: Lying)   Pulse 68   Temp 97.8 °F (36.6 °C) (Oral)   Resp 18   Ht 152.4 cm (60\")   Wt 37.2 kg (82 lb)   SpO2 95%   BMI 16.01 kg/m²   37.2 kg (82 lb) Body mass index is 16.01 kg/m².  Intake & Output (last day)       08/06 0701 - 08/07 0700 08/07 0701 - 08/08 0700    P.O. 460     I.V. (mL/kg) 500 (13.4) 1000 (26.9)    Total Intake(mL/kg) 960 (25.8) 1000 (26.9)    Net +960 +1000          Urine Unmeasured Occurrence 1 x           Physical Exam:  General Appearance: No acute distress, healthy appearance  Head: Normocephalic without obvious abnormality, atraumatic  Eyes: Conjunctivae and sclerae normal, no icterus, " NOE  Throat: No oral lesions, no thrush, oral mucosa moist  Neck: No adenopathy, supple trachea, midline  Back: No kyphosis present, no skin lesions, erythema or scars, no tenderness midline  Lungs: Clear to auscultation, respirations regular, even and non-labored  Heart: Regular rhythm and normal rate, normal S1  Abdomen: Normal bowel sounds, soft, non-distended, non-tender  Extremities: Moves all extremities, no redness, no cyanosis, no edema, no                    bilateral feet sores or wounds  Pulses: Distal pulses palpable and equal bilaterally  Skin: Warm and dry, no bleeding, bruising or rash  Neurological: Alert, interactive, appropriate conversation,no myoclonus, equal hand  and strength, able to lift lower extremities off bed    Reviewed labs and diagnostic results.  No results found for: HGBA1C  Results from last 7 days   Lab Units 08/05/19  0720   WBC 10*3/mm3 8.38   HEMOGLOBIN g/dL 13.2   HEMATOCRIT % 42.0   PLATELETS 10*3/mm3 237     Results from last 7 days   Lab Units 08/07/19  0319  08/04/19  0109   SODIUM mmol/L 144   < > 142   POTASSIUM mmol/L 3.7   < > 4.1   CHLORIDE mmol/L 109*   < > 106   CO2 mmol/L 23.2   < > 21.6*   BUN mg/dL 34*   < > 36*   CREATININE mg/dL 1.24*   < > 1.29*   CALCIUM mg/dL 8.9   < > 9.3   BILIRUBIN mg/dL  --   --  0.4   ALK PHOS U/L  --   --  77   ALT (SGPT) U/L  --   --  16   AST (SGOT) U/L  --   --  23   GLUCOSE mg/dL 110*   < > 94    < > = values in this interval not displayed.     Results from last 7 days   Lab Units 08/07/19  0319   SODIUM mmol/L 144   POTASSIUM mmol/L 3.7   CHLORIDE mmol/L 109*   CO2 mmol/L 23.2   BUN mg/dL 34*   CREATININE mg/dL 1.24*   GLUCOSE mg/dL 110*   CALCIUM mg/dL 8.9     Imaging Results (last 72 hours)     ** No results found for the last 72 hours. **        Impression: 89 y.o. female admitted with hypertensive emergency, falls, and dementia vs. Age related decline.     Plan:     1.  Goals of Care   - Patient needed much redirection  today with discussion. No family at bedside.  According to chart, she has no children, not .  Siblings help care for her.  Which would make the majority of her siblings the decision makers.  I will try to catch family in the AM.  According to primary note, family has been informed of need for some decisions and awaiting them to discuss.  She possibly will need nursing home placement.  I will follow up in AM and call siblings tomorrow if none at bedside.      2.  Cough   - Patient complains of cough.  Suggest scheduled cough med. Now that BP is improving.      3.  Altered mental status   - Dementia vs. Age related.  Patient is malnourished.  Likely will need NH placement if family agreeable.      We appreciate the consult and will continue to follow along and work with patient and family.        Georgie Welch, APRN  [phone]  08/07/19  5:43 PM      Time:45 minutes spent reviewing medical and medication records, assessing and examining patient, discussing with patient, answering questions, formulating a plan and documentation of care. > 50% time spent face to face

## 2019-08-07 NOTE — PLAN OF CARE
Problem: Patient Care Overview  Goal: Plan of Care Review  Outcome: Ongoing (interventions implemented as appropriate)   08/07/19 1144   Coping/Psychosocial   Plan of Care Reviewed With patient   Plan of Care Review   Progress improving   OTHER   Outcome Summary Patient tolerated PT session well this date with adequate rest breaks. Patient requires verbal & tactile cues for participation. She is pleasently confused. Continue w/ current POC.

## 2019-08-07 NOTE — PROGRESS NOTES
Discharge Planning Assessment  [facility]     Patient Name: Lolis Infante  MRN: [id number]  Today's Date: 8/7/2019    Admit Date: 8/3/2019      Discharge Plan     Row Name 08/07/19 1359       Plan    Plan Dr. Avila provided SS with the guardianship statement on this date. SS contacted pt's sister, Teresa who is agreeable to obtain the emergency guardianship of pt. Teresa states she will be here on this date to get the guardianship statement. Per MD, pt will be ready for discharge tomorrow and has a bed available at [facility] beds at the [facility]. SS made Savanah aware. SS will continue to follow.        Michelle Kent

## 2019-08-07 NOTE — THERAPY TREATMENT NOTE
Acute Care - Physical Therapy Treatment Note   [facility]     Patient Name: Lolis Infante  : 1929  MRN: [id number]  Today's Date: 2019  Onset of Illness/Injury or Date of Surgery: 19(admit date)  Date of Referral to PT: 19  Referring Physician: Solange    Admit Date: 8/3/2019    Visit Dx:    ICD-10-CM ICD-9-CM   1. Hypertensive emergency I16.1 401.9   2. Dehydration E86.0 276.51   3. Congestive heart failure, unspecified HF chronicity, unspecified heart failure type (CMS/[facility]) I50.9 428.0     Patient Active Problem List   Diagnosis   • Hypertensive emergency       Therapy Treatment    Rehabilitation Treatment Summary     Row Name 19 1134             Treatment Time/Intention    Discipline  physical therapy assistant  -LL      Document Type  therapy note (daily note)  -LL      Subjective Information  no complaints  -LL      Mode of Treatment  individual therapy;physical therapy  -LL      Therapy Frequency (PT Clinical Impression)  3 times/wk 3-5 times/wk as available  -LL      Patient Effort  fair  -LL      Existing Precautions/Restrictions  fall  -LL      Patient Response to Treatment  Patient tolerated PT session fairly well with adequate rest breaks.  Patient did need verbal & tactile cues to complete activities.  She was pleasantly confused.  -LL      Recorded by [LL] Vera Donahue PTA 19 1143      Row Name 19 1134             Cognitive Assessment/Intervention- PT/OT    Orientation Status (Cognition)  oriented to;person  -LL      Follows Commands (Cognition)  follows one step commands;0-24% accuracy;delayed response/completion;physical/tactile prompts required  -LL      Recorded by [LL] Vera Donahue PTA 19 1143      Row Name 19 1134             Bed Mobility Assessment/Treatment    Bed Mobility Assessment/Treatment  bed mobility (all) activities  -LL      [place] Level (Bed Mobility)  supervision  -LL      Bed Mobility, Safety Issues  decreased use of arms  for pushing/pulling;decreased use of legs for bridging/pushing  -LL      Assistive Device (Bed Mobility)  bed rails  -LL      Recorded by [LL] Vera Donahue, [facility] 08/07/19 1143      Row Name 08/07/19 1134             Transfer Assessment/Treatment    Transfer Assessment/Treatment  sit-stand transfer;stand-sit transfer  -LL      Recorded by [LL] Vera Donahue, [facility] 08/07/19 1143      Row Name 08/07/19 1134             Sit-Stand Transfer    Sit-Stand [place] (Transfers)  contact guard  -LL      Recorded by [LL] Vera Donahue, [facility] 08/07/19 1143      Row Name 08/07/19 1134             Stand-Sit Transfer    Stand-Sit [place] (Transfers)  contact guard  -LL      Recorded by [LL] Vera Donahue, [facility] 08/07/19 1143      Row Name 08/07/19 1134             Gait/Stairs Assessment/Training    [place] Level (Gait)  minimum assist (75% patient effort)  -LL      Assistive Device (Gait)  walker, front-wheeled  -LL      Distance in Feet (Gait)  120  -LL      Pattern (Gait)  step-through  -LL      Deviations/Abnormal Patterns (Gait)  gait speed decreased;base of support, narrow  -LL      Bilateral Gait Deviations  forward flexed posture;weight shift ability decreased  -LL      Recorded by [LL] Vera Donahue, [facility] 08/07/19 1143      Row Name 08/07/19 1134             Motor Skills Assessment/Interventions    Additional Documentation  Therapeutic Exercise (Group)  -LL      Recorded by [LL] Vera Donahue, [facility] 08/07/19 1143      Row Name 08/07/19 1134             Therapeutic Exercise    Therapeutic Exercise  seated, lower extremities  -LL      Additional Documentation  Therapeutic Exercise (Row)  -LL      Recorded by [LL] Vera Donahue, [facility] 08/07/19 1143      Row Name 08/07/19 1134             Lower Extremity Seated Therapeutic Exercise    Performed, Seated Lower Extremity (Therapeutic Exercise)  LAQ (long arc quad), knee extension;ankle dorsiflexion/plantarflexion Pillow squeeze  -LL      Exercise Type, Seated  Lower Extremity (Therapeutic Exercise)  AAROM (active assistive range of motion) verbal & tactile cues to complete activities  -      Sets/Reps Detail, Seated Lower Extremity (Therapeutic Exercise)  10  -LL      Recorded by [LL] Vera Donahue, [facility] 08/07/19 1143      Row Name 08/07/19 1134             Positioning and Restraints    Pre-Treatment Position  in bed  -LL      Post Treatment Position  bed  -LL      In Bed  supine;call light within reach;encouraged to call for assist;exit alarm on;side rails up x3;heels elevated  -LL      Recorded by [LL] Vera Donahue, [facility] 08/07/19 1143      Row Name 08/07/19 1134             Outcome Summary/Treatment Plan (PT)    Anticipated Equipment Needs at Discharge (PT)  front wheeled walker  -LL      Anticipated Discharge Disposition (PT)  skilled nursing facility  -LL      Recorded by [LL] Vera Donahue, [facility] 08/07/19 1143        User Key  (r) = Recorded By, (t) = Taken By, (c) = Cosigned By    Initials Name Effective Dates Discipline    LL Vera Donahue, [facility] 05/02/16 -  PT                   Physical Therapy Education     Title: PT OT SLP Therapies (In Progress)     Topic: Physical Therapy (In Progress)     Point: Mobility training (In Progress)     Learning Progress Summary           Patient Acceptance, E,D, NR by  at 8/7/2019 11:43 AM    Acceptance, E,TB, VU by JS at 8/6/2019  9:44 PM    Acceptance, E, NR by LT at 8/6/2019  1:37 PM    Acceptance, E,TB, NL,NR by CT at 8/5/2019  3:09 PM                   Point: Home exercise program (In Progress)     Learning Progress Summary           Patient Acceptance, E,D, NR by LL at 8/7/2019 11:43 AM    Acceptance, E,TB, VU by JS at 8/6/2019  9:44 PM    Acceptance, E, NR by LT at 8/6/2019  1:37 PM    Acceptance, E,TB, NL,NR by CT at 8/5/2019  3:09 PM                   Point: Body mechanics (In Progress)     Learning Progress Summary           Patient Acceptance, E,D, NR by LL at 8/7/2019 11:43 AM    Acceptance, E,TB, VU by JS at  8/6/2019  9:44 PM    Acceptance, E, NR by LT at 8/6/2019  1:37 PM    Acceptance, E,TB, NL,NR by CT at 8/5/2019  3:09 PM                   Point: Precautions (In Progress)     Learning Progress Summary           Patient Acceptance, E,D, NR by LL at 8/7/2019 11:43 AM    Acceptance, E,TB, VU by JS at 8/6/2019  9:44 PM    Acceptance, E, NR by LT at 8/6/2019  1:37 PM    Acceptance, E,TB, NL,NR by CT at 8/5/2019  3:09 PM                               User Key     Initials Effective Dates Name Provider Type Discipline    LT 06/16/16 -  Marika Welch, RN Registered Nurse Nurse    CT 04/03/18 -  Lima Bob, PT Physical Therapist PT    LL 05/02/16 -  Vera Donahue PTA Physical Therapy Assistant PT    JS 11/26/18 -  Ad Leon, RN Registered Nurse Nurse                PT Recommendation and Plan  Anticipated Discharge Disposition (PT): skilled nursing facility  Therapy Frequency (PT Clinical Impression): 3 times/wk(3-5 times/wk as available)  Outcome Summary/Treatment Plan (PT)  Anticipated Equipment Needs at Discharge (PT): front wheeled walker  Anticipated Discharge Disposition (PT): skilled nursing facility  Plan of Care Reviewed With: patient  Progress: improving  Outcome Summary: Patient tolerated PT session well this date with adequate rest breaks.  Patient requires verbal & tactile cues for participation.  She is pleasently confused.  Continue w/ current POC.     Time Calculation:   PT Charges     Row Name 08/07/19 1506             Time Calculation    PT Non-Billable Time (min)  40 min  -      PT Received On  08/07/19  -         Time Calculation- PT    Total Timed Code Minutes- PT  40 minute(s)  -LL        User Key  (r) = Recorded By, (t) = Taken By, (c) = Cosigned By    Initials Name Provider Type    LL Vera Donahue PTA Physical Therapy Assistant        Therapy Charges for Today     Code Description Service Date Service Provider Modifiers Qty    49683876607 HC GAIT TRAINING EA 15 MIN 8/7/2019  Vera Donahue, PTA GP 1    92800130726 HC PT THERAPEUTIC ACT EA 15 MIN 8/7/2019 Vera Donahue, PTA GP 1    89807182094 HC PT THER PROC EA 15 MIN 8/7/2019 Vera Donahue, PTA GP 1    32569716902 HC PT THER SUPP EA 15 MIN 8/7/2019 Vera Donahue, PTA GP 2               Vera. QUIN Donahue  8/7/2019

## 2019-08-07 NOTE — PLAN OF CARE
Problem: Patient Care Overview  Goal: Plan of Care Review  Outcome: Ongoing (interventions implemented as appropriate)      Problem: Fall Risk (Adult)  Goal: Absence of Fall  Outcome: Ongoing (interventions implemented as appropriate)      Problem: Hypertensive Disease/Crisis (Arterial) (Adult)  Goal: Signs and Symptoms of Listed Potential Problems Will be Absent, Minimized or Managed (Hypertensive Disease/Crisis)  Outcome: Ongoing (interventions implemented as appropriate)      Problem: Skin Injury Risk (Adult)  Goal: Skin Health and Integrity  Outcome: Ongoing (interventions implemented as appropriate)      Problem: Mobility, Physical Impaired (Adult)  Goal: Identify Related Risk Factors and Signs and Symptoms  Outcome: Ongoing (interventions implemented as appropriate)

## 2019-08-08 VITALS
HEART RATE: 63 BPM | SYSTOLIC BLOOD PRESSURE: 117 MMHG | OXYGEN SATURATION: 95 % | TEMPERATURE: 97.3 F | WEIGHT: 82.3 LBS | DIASTOLIC BLOOD PRESSURE: 64 MMHG | HEIGHT: 60 IN | RESPIRATION RATE: 18 BRPM | BODY MASS INDEX: 16.16 KG/M2

## 2019-08-08 PROBLEM — I16.1 HYPERTENSIVE EMERGENCY: Status: RESOLVED | Noted: 2019-08-03 | Resolved: 2019-08-08

## 2019-08-08 LAB
ANION GAP SERPL CALCULATED.3IONS-SCNC: 11 MMOL/L (ref 5–15)
BACTERIA SPEC AEROBE CULT: NORMAL
BACTERIA SPEC AEROBE CULT: NORMAL
BUN BLD-MCNC: 30 MG/DL (ref 8–23)
BUN/CREAT SERPL: 26.1 (ref 7–25)
CALCIUM SPEC-SCNC: 8.4 MG/DL (ref 8.2–9.6)
CHLORIDE SERPL-SCNC: 107 MMOL/L (ref 98–107)
CO2 SERPL-SCNC: 23 MMOL/L (ref 22–29)
CREAT BLD-MCNC: 1.15 MG/DL (ref 0.57–1)
GFR SERPL CREATININE-BSD FRML MDRD: 44 ML/MIN/1.73
GLUCOSE BLD-MCNC: 119 MG/DL (ref 65–99)
POTASSIUM BLD-SCNC: 4 MMOL/L (ref 3.5–5.2)
SODIUM BLD-SCNC: 141 MMOL/L (ref 136–145)

## 2019-08-08 PROCEDURE — 99238 HOSP IP/OBS DSCHRG MGMT 30/<: CPT | Performed by: HOSPITALIST

## 2019-08-08 PROCEDURE — 80048 BASIC METABOLIC PNL TOTAL CA: CPT | Performed by: HOSPITALIST

## 2019-08-08 PROCEDURE — 25010000002 HEPARIN (PORCINE) PER 1000 UNITS: Performed by: INTERNAL MEDICINE

## 2019-08-08 RX ORDER — CHOLECALCIFEROL (VITAMIN D3) 125 MCG
5 CAPSULE ORAL NIGHTLY
Start: 2019-08-08

## 2019-08-08 RX ORDER — AMLODIPINE BESYLATE 5 MG/1
5 TABLET ORAL
Start: 2019-08-08 | End: 2020-07-21

## 2019-08-08 RX ADMIN — AMLODIPINE BESYLATE 5 MG: 5 TABLET ORAL at 07:05

## 2019-08-08 RX ADMIN — HEPARIN SODIUM 5000 UNITS: 5000 INJECTION INTRAVENOUS; SUBCUTANEOUS at 07:05

## 2019-08-08 NOTE — SIGNIFICANT NOTE
08/08/19 1502   Rehab Treatment   Reason Treatment Not Performed (RN requested no PT for patient secondary to patient being up all night & just falling asleep.)

## 2019-08-08 NOTE — PLAN OF CARE
Problem: Patient Care Overview  Goal: Plan of Care Review  Outcome: Ongoing (interventions implemented as appropriate)    Goal: Individualization and Mutuality  Outcome: Ongoing (interventions implemented as appropriate)    Goal: Discharge Needs Assessment  Outcome: Ongoing (interventions implemented as appropriate)    Goal: Interprofessional Rounds/Family Conf  Outcome: Ongoing (interventions implemented as appropriate)      Problem: Fall Risk (Adult)  Goal: Absence of Fall  Outcome: Ongoing (interventions implemented as appropriate)      Problem: Hypertensive Disease/Crisis (Arterial) (Adult)  Goal: Signs and Symptoms of Listed Potential Problems Will be Absent, Minimized or Managed (Hypertensive Disease/Crisis)  Outcome: Ongoing (interventions implemented as appropriate)      Problem: Skin Injury Risk (Adult)  Goal: Skin Health and Integrity  Outcome: Ongoing (interventions implemented as appropriate)      Problem: Mobility, Physical Impaired (Adult)  Goal: Identify Related Risk Factors and Signs and Symptoms  Outcome: Outcome(s) achieved Date Met: 08/08/19    Goal: Enhanced Mobility Skills  Outcome: Ongoing (interventions implemented as appropriate)    Goal: Enhanced Functional Ability  Outcome: Ongoing (interventions implemented as appropriate)

## 2019-08-08 NOTE — PROGRESS NOTES
Discharge Planning Assessment  [facility]     Patient Name: Lolis Infante  MRN: [id number]  Today's Date: 8/8/2019    Admit Date: 8/3/2019        Discharge Plan     Row Name 08/08/19 1350       Plan    Final Discharge Disposition Code  03 - skilled nursing facility (SNF)    Final Note  Pt to be discharged to [facility] and [facility] on this date.  Pt's sister, Teresa, has applied for Guardianship and is aware of pt being discharged to [facility] and [facility] on this date.  Pt t be transported via EMS.          Destination      Service Provider Request Status Selected Services Address Phone Number Fax Number    [facility] Pending - [facility] Sent N/A [address] [facility] [address] [phone]    [facility] Pending - Request Sent N/A [address] [id number] [phone]    [facility] - Request Sent N/A [address] [facility] [address] [phone]       Catherine Heath

## 2019-08-08 NOTE — DISCHARGE PLACEMENT REQUEST
"Ashley Infante (89 y.o. Female)     Date of Birth Social Security Number Address Home Phone MRN    06/26/1929  [address] [facility] [address] [phone] [id number]    [facility] Mar[employer] Status          Anglican        Admission Date Admission Type Admitting Provider Attending Provider Department, Room/Bed    8/3/19 Emergency Lynda Narvaez DO Srinivas, Priyanka, MD [address], 3306/2S    Discharge Date Discharge Disposition Discharge Destination         Nursing Facility (DC - External)              Attending Provider:  Iliana Flanagan MD    Allergies:  No Known Allergies    Isolation:  None   Infection:  None   Code Status:  CPR    Ht:  152.4 cm (60\")   Wt:  37.3 kg (82 lb 4.8 oz)    Admission Cmt:  None   Principal Problem:  None                Active Insurance as of 8/3/2019     Primary Coverage     Payor Plan Insurance Group Employer/Plan Group    MEDICARE MEDICARE A & B      Payor Plan Address Payor Plan Phone Number Payor Plan Fax Number Effective Dates    [address] [phone]  7/1/1994 - None Entered    [facility] [address]       Subscriber Name Subscriber Birth Date Member ID       ASHLEY INFANTE 6/26/1929 [id number]           Secondary Coverage     Payor Plan Insurance Group Employer/Plan Group    ANTHEM MEDICAID ANTH MEDICAID [id number]     Payor Plan Address Payor Plan Phone Number Payor Plan Fax Number Effective Dates    [address] [address] [phone]  1/3/2019 - None Entered    [facility] [address]       Subscriber Name Subscriber Birth Date Member ID       ASHLEY INFANTE 6/26/1929 [id number]                 Emergency Contacts      (Rel.) Home Phone Work Phone Mobile Phone    Jorge Infante (Other) [phone] -- --    DAMIÁN INFANTE (Sister) [phone] -- --            Emergency Contact Information     Name Relation Home Work Mobile    Jorge Infante Other [phone]      DAMIÁN INFANTE Sister [phone]            Insurance Information           " "     MEDICARE/MEDICARE A & B Phone: [phone]    Subscriber: Lolis Infante Subscriber#: [id number]    Group#:  Precert#:         ANTHEM MEDICAID/ANTHEM MEDICAID Phone: [phone]    Subscriber: Lolis Infante Subscriber#: [id number]    Group#: [id number] Precert#:           Treatment Team     Provider Relationship Specialty Contact    Iliana Flanagan MD Attending, Physician of Record Hospitalist [phone]    Vera Donahue PTA Physical Therapy Assistant Physical Therapy     Dianna Metcalf RN Registered Nurse --     Jose Jones Patient Care Technician -- [phone]    Venus Larson, RRT Respiratory Therapist -- [phone]    Mirza Avila MD Consulting Physician Psychiatry [phone]    Georgie Welch, LUX Consulting Physician Hospice and Palliative Medicine [phone]    Papo Welch RN Registered Nurse --                History & Physical      Lynda Narvaez DO at 2019 12:35 AM          Hospitalist History and Physical    Patient Identification:  Name: Lolis Infante  Age/Sex: 89 y.o. female  :  1929  MRN: [id number]         Primary Care Physician: Provider, No Known    Chief Complaint   Patient presents with   • Cough   • Back Pain       History of Present Illness  Patient is a 89 y.o. female presents with the following: falls, intermittent confusion    The patient is a 89 yo female with no past medical history who presents to the emergency department with family due to reported cough, congestion and falls.    The patient lives alone but has siblings who check in on her.  The patient's sister at her present at bedside state that their brother reported that the patient had been having some cough and congestion with \"rattling\" in her chest for approximately 2 days.  Patient apparently had a fall 2 days ago as well and has been unsteady on her feet per her sister's report.  The patient has also had decreased appetite.  Her sister states that the patient's " "brother will make her 1 pot of coffee per day and that she drinks \"hungry man meals.\"    The patient sister states that at times the patient has difficulty with memory and appears confused.  She apparently though has refused to move in with any family members and refuses to allow anyone to stay with her.  The patient does not have children.    The patient is awake and alert.  She is unable to provide history and claims that the reason for this is because she is \"tired.\"  She is oriented to self and has to be reminded of place.    In the emergency department, the patient's blood pressure was initially significantly elevated maximum blood pressure 196/117.  Her proBNP was 1929.  Chest x-ray revealed mild cardiomegaly.  CBC was unremarkable and CMP revealed a BUN of 38 with creatinine of 1.44 and a BUN to creatinine ratio of 26.4.  The patient has been admitted to the hospital service for further evaluation.    Present during visit: ROB Vance and the patient's 2 sisters    Past History:  History reviewed. No pertinent past medical history.  History reviewed. No pertinent surgical history.  Family History   Family history unknown: Yes     Social History     Tobacco Use   • Smoking status: Never Smoker   Substance Use Topics   • Alcohol use: No   • Drug use: No     No medications prior to admission.     Allergies: Patient has no known allergies.    Review of Systems:  Review of Systems   Respiratory: Positive for cough and shortness of breath.    Psychiatric/Behavioral: Positive for confusion.     Difficult to assess due to confusion/mental status change    Vital Signs  Temp:  [96.9 °F (36.1 °C)-98.2 °F (36.8 °C)] 96.9 °F (36.1 °C)  Heart Rate:  [79-85] 79  Resp:  [16-20] 16  BP: (123-196)/() 137/72  Body mass index is 16.09 kg/m².    Physical Exam:  Physical Exam   Constitutional: She appears well-developed and well-nourished. She appears cachectic. No distress.   Chronically ill appearing.   HENT:   Head: " Normocephalic and atraumatic.   Mouth/Throat: Oropharynx is clear and moist. Dental caries (Poor dentition.) present.   Eyes: Conjunctivae and EOM are normal. Pupils are equal, round, and reactive to light.   Neck: Neck supple. No tracheal deviation present. No thyromegaly present.   Cardiovascular: Normal rate and regular rhythm. Exam reveals no gallop and no friction rub.   No murmur heard.  Pulmonary/Chest: Breath sounds normal. No respiratory distress. She has no wheezes. She has no rales.   Abdominal: Soft. Bowel sounds are normal. She exhibits no distension. There is no tenderness. There is no guarding.   Musculoskeletal: Normal range of motion. She exhibits no tenderness.   Neurological: She is alert.   Follows some commands; equal strength B/L LE 3/5.   Skin: Skin is warm and dry. No rash noted. No erythema.   Psychiatric: She has a normal mood and affect.      Results Review:    Results from last 7 days   Lab Units 08/03/19 1923   PH, ARTERIAL pH units 7.421   PO2 ART mm Hg 76.7*   PCO2, ARTERIAL mm Hg 33.4*   HCO3 ART mmol/L 21.2*       Results from last 7 days   Lab Units 08/03/19  1909   WBC 10*3/mm3 7.17   HEMOGLOBIN g/dL 13.4   HEMATOCRIT % 42.1   PLATELETS 10*3/mm3 233     Results from last 7 days   Lab Units 08/03/19  1909   SODIUM mmol/L 143   POTASSIUM mmol/L 4.3   CHLORIDE mmol/L 102   CO2 mmol/L 23.5   BUN mg/dL 38*   CREATININE mg/dL 1.44*   CALCIUM mg/dL 9.5   GLUCOSE mg/dL 95     Results from last 7 days   Lab Units 08/03/19  1909   BILIRUBIN mg/dL 0.4   ALK PHOS U/L 84   AST (SGOT) U/L 26   ALT (SGPT) U/L 18     Results from last 7 days   Lab Units 08/03/19  1929   CRP mg/dL 0.28         Results from last 7 days   Lab Units 08/03/19  1909   TROPONIN T ng/mL <0.010         Imaging:    I have personally reviewed the EKG. Pending official cardiology interpretation, however, per my view: NSR with LVH; non-specific ST-T changes inferolaterally; Q waves laterally with a QTc 457 ms    Imaging  Results (most recent)     Procedure Component Value Units Date/Time    XR Chest AP [[id number]] Collected:  08/03/19 2015     Updated:  08/03/19 2017    Narrative:       CR Chest 1 Vw    INDICATION:   9-year-old female with shortness of air and cough today.     COMPARISON:    Chest x-ray 2/21/2019  CT chest 2/21/2019    FINDINGS:  Single portable AP view of the chest.  Stable mild cardiomegaly. Mediastinal contours are normal. The lungs are clear. No pneumothorax or pleural effusion. Advanced right glenohumeral arthrosis. Old, healed right-sided rib fracture.      Impression:       Stable mild cardiomegaly. No other acute chest findings.    Signer Name: Jose A Hilario MD   Signed: 8/3/2019 8:15 PM   Workstation Name: [email or web address]-    [facility]          Assessment/Plan     -Possible acute hypertensive emergency, improved after clonidine and hydralazine: Patient has been admitted to the telemetry unit. Will monitor for now and allow for permissive hypertension.     -Pre-renal azotemia, mild: Will gently hydrate x 12 hours.     -Elevated anion gap: Repeat chemistries in am after IV fluid hydration.     -Elevated pro-BNP with cardiomegaly: Obtain echocardiogram. Clinically does not appear in acute heart failure.     -Malnutrition, suspect chronic: Consult nutrition.     -Intermittent confusion, dementia versus age related cognitive decline: Obtain TSH, vitamin B12 and folate levels.  Consult physical therapy.   has been consulted for discharge planning.  I discussed with the patient's sisters and encouraged them to discuss amongst themselves and their other siblings regarding applying for patient's power of /decision-maker. It does not appear that patient can be discharged to live with one of her siblings. Family may want nursing home placement. Patient will likely require a psychiatry consultation to determine if she is competent to make medical decisions.    DVT  "prophylaxis: Subcutaneous heparin    Estimated Length of Stay: > 2 MNs    CODE: FULL/CPR    I discussed the patients findings and my recommendations with family and nursing staff      Lynda Narvaez DO  08/04/19  12:35 AM    Electronically signed by Lynda Narvaez DO at 8/4/2019  2:01 AM       Hospital Medications (active)       Dose Frequency Start End    amLODIPine (NORVASC) tablet 5 mg 5 mg Every 24 Hours Scheduled 8/4/2019     Sig - Route: Take 1 tablet by mouth Daily. - Oral    heparin (porcine) 5000 UNIT/ML injection 5,000 Units 5,000 Units Every 12 Hours Scheduled 8/4/2019     Sig - Route: Inject 1 mL under the skin into the appropriate area as directed Every 12 (Twelve) Hours. - Subcutaneous    hydrALAZINE (APRESOLINE) tablet 10 mg 10 mg Every 6 Hours PRN 8/5/2019     Sig - Route: Take 1 tablet by mouth Every 6 (Six) Hours As Needed (sustained SBP >170, DBP >100). - Oral    melatonin tablet 5 mg 5 mg Nightly 8/6/2019     Sig - Route: Take 1 tablet by mouth Every Night. - Oral    nitroglycerin (NITROSTAT) SL tablet 0.4 mg 0.4 mg Every 5 Minutes PRN 8/4/2019     Sig - Route: Place 1 tablet under the tongue Every 5 (Five) Minutes As Needed for Chest Pain (Only if SBP Greater Than 100). - Sublingual    sodium chloride 0.45 % infusion 50 mL/hr Once 8/7/2019 8/7/2019    Sig - Route: Infuse 50 mL/hr into a venous catheter 1 (One) Time. - Intravenous    sodium chloride 0.9 % flush 10 mL 10 mL As Needed 8/3/2019     Sig - Route: Infuse 10 mL into a venous catheter As Needed for Line Care. - Intravenous    sodium chloride 0.9 % flush 10 mL 10 mL As Needed 8/3/2019     Sig - Route: Infuse 10 mL into a venous catheter As Needed for Line Care. - Intravenous    Cosign for Ordering: Accepted by Asha Dillon DO on 8/5/2019  7:57 AM    Linked Group 1:  \"And\" Linked Group Details        sodium chloride 0.9 % flush 3 mL 3 mL Every 12 Hours Scheduled 8/4/2019     Sig - Route: Infuse 3 mL into a venous " catheter Every 12 (Twelve) Hours. - Intravenous    sodium chloride 0.9 % flush 3-10 mL 3-10 mL As Needed 8/4/2019     Sig - Route: Infuse 3-10 mL into a venous catheter As Needed for Line Care. - Intravenous          Orders (last 24 hrs)     Start     Ordered    08/08/19 0948  Discharge patient  Once      08/08/19 0948    08/08/19 0948  Discontinue IV  Once      08/08/19 0948    08/08/19 0600  Basic Metabolic Panel  Daily      08/07/19 0832    08/08/19 0000  melatonin 5 MG tablet tablet  Nightly      08/08/19 0948    08/08/19 0000  amLODIPine (NORVASC) 5 MG tablet  Every 24 Hours Scheduled      08/08/19 0948    08/08/19 0000  Discharge Follow-up with PCP      08/08/19 0948    08/08/19 0000  Diet: Regular      08/08/19 0948    08/08/19 0000  Activity as Tolerated      08/08/19 0948    08/07/19 0930  sodium chloride 0.45 % infusion  Once      08/07/19 0832    08/06/19 2100  melatonin tablet 5 mg  Nightly      08/06/19 1236    08/05/19 1800  Dietary Nutrition Supplements Boost Plus (Ensure Enlive, Ensure Plus)  Daily With Breakfast, Lunch & Dinner     Comments:  stwberry    08/05/19 1450    08/05/19 1119  hydrALAZINE (APRESOLINE) tablet 10 mg  Every 6 Hours PRN      08/05/19 1119    08/04/19 1730  amLODIPine (NORVASC) tablet 5 mg  Every 24 Hours Scheduled      08/04/19 1617    08/04/19 0130  sodium chloride 0.9 % flush 3 mL  Every 12 Hours Scheduled      08/04/19 0042    08/04/19 0130  heparin (porcine) 5000 UNIT/ML injection 5,000 Units  Every 12 Hours Scheduled      08/04/19 0042    08/04/19 0042  sodium chloride 0.9 % flush 3-10 mL  As Needed      08/04/19 0042    08/04/19 0042  nitroglycerin (NITROSTAT) SL tablet 0.4 mg  Every 5 Minutes PRN      08/04/19 0042    08/03/19 2018  sodium chloride 0.9 % flush 10 mL  As Needed      08/03/19 2018    08/03/19 1818  sodium chloride 0.9 % flush 10 mL  As Needed      08/03/19 1818    Unscheduled  Telemetry - Pulse Oximetry  Continuous PRN     Comments:  If Patient Develops  Unresponsiveness, Acute Dyspnea, Cyanosis or Suspected Hypoxemia Start Continuous Pulse Ox Monitoring, Apply Oxygen & Notify Provider    19    Unscheduled  Oxygen Therapy- Nasal Cannula; Titrate for SPO2: 90% - 95%  Continuous PRN     Comments:  If Patient Develops Unresponsiveness, Acute Dyspnea, Cyanosis or Suspected Hypoxemia Start Continuous Pulse Ox Monitoring, Apply Oxygen & Notify Provider    19    Unscheduled  ECG 12 Lead  As Needed     Comments:  Nurse to Release if Patient Expericences Acute Chest Pain or Dysrhythmias    19    Unscheduled  Potassium  As Needed     Comments:  For Ventricular Arrhythmias      19    Unscheduled  Magnesium  As Needed     Comments:  For Ventricular Arrhythmias      19    Unscheduled  Troponin  As Needed     Comments:  For Chest Pain      19    Unscheduled  Digoxin Level  As Needed     Comments:  For Atrial Arrhythmias      19    Unscheduled  Blood Gas, Arterial  As Needed     Comments:  Per O2 PolicyNotify Physician      19    Unscheduled  Up With Assistance  As Needed      19    --  SCANNED - TELEMETRY        19 0000             Physician Progress Notes (last 24 hours) (Notes from 2019 10:18 AM through 2019 10:18 AM)      Iliana Flanagan MD at 2019  4:00 PM              [facility]     Patient Identification:  Name:  Lolis Infante  Age:  89 y.o.  Sex:  female  :  1929  MRN:  [id number]  Visit Number:  [id number]  ROOM: [address] [place] [address]     Primary Care Provider:  Provider, No Known    Length of stay:  4    Subjective        Chief Compliant Follow up for the HTN    Patient seen and examined this morning with no family at the bedside.  Patient is doing much better. Slept well last night. Awake, ate well but pleasantly confused. Did not converse properly or followed the commands. History very limited. Blood pressure  improving.  Afebrile and no events overnight.      Objective     Current [facility] Meds:    amLODIPine 5 mg Oral Q24H   heparin (porcine) 5,000 Units Subcutaneous Q12H   melatonin 5 mg Oral Nightly   sodium chloride 3 mL Intravenous Q12H        ----------------------------------------------------------------------------------------------------------------------  Vital Signs:  Temp:  [97.3 °F (36.3 °C)-97.7 °F (36.5 °C)] 97.6 °F (36.4 °C)  Heart Rate:  [63-66] 63  Resp:  [18] 18  BP: ()/(54-84) 159/84  SpO2:  [92 %-97 %] 95 %  on   ;   Device (Oxygen Therapy): room air  Body mass index is 16.01 kg/m².    Wt Readings from Last 3 Encounters:   08/07/19 37.2 kg (82 lb)   02/21/19 45.4 kg (100 lb)   07/12/16 47.6 kg (105 lb)       Intake/Output Summary (Last 24 hours) at 8/7/2019 1602  Last data filed at 8/7/2019 1142  Gross per 24 hour   Intake 1460 ml   Output --   Net 1460 ml     Diet Soft Texture; Chopped  ----------------------------------------------------------------------------------------------------------------------  Physical exam:  General: Comfortable, Not in distress.  Cachectic  HENT:  Head:  Normocephalic and atraumatic.  Mouth:  Moist mucous membranes.    Eyes:  Conjunctivae and EOM are normal.  Pupils are equal, round, and reactive to light.  No scleral icterus.    Neck:  Neck supple.  No JVD present. Trachea midline.   Cardiovascular:  Normal rate, regular rhythm with no murmur.  Pulmonary/Chest:  No respiratory distress, no wheezes, no crackles, with normal breath sounds and good air movement.  Abdomen:  Soft.  Bowel sounds are normal.  No distension and no tenderness. No organomegaly.   Musculoskeletal:  No edema, no tenderness, and no deformity.  No red or swollen joints anywhere.    Neurological:  Alert and oriented to person, place, and time.  No cranial nerve deficit. No focal deficits. No facial droop.  No slurred speech.   Skin:  Skin is warm and dry. No rash noted. No pallor.   Peripheral  vascular:  pulses in all 4 extremities with no clubbing, no cyanosis, no edema.  Genitourinary: No ridley  ----------------------------------------------------------------------------------------------------------------------  ----------------------------------------------------------------------------------------------------------------------  Results from last 7 days   Lab Units 08/05/19 0720 08/04/19 0109 08/03/19 1929 08/03/19 1909   CRP mg/dL  --   --  0.28  --    LACTATE mmol/L  --   --  1.4  --    WBC 10*3/mm3 8.38 9.46  --  7.17   HEMOGLOBIN g/dL 13.2 12.8  --  13.4   HEMATOCRIT % 42.0 40.7  --  42.1   MCV fL 93.5 94.0  --  93.1   MCHC g/dL 31.4* 31.4*  --  31.8   PLATELETS 10*3/mm3 237 242  --  233     Results from last 7 days   Lab Units 08/07/19 0319 08/06/19 0832 08/05/19 0720 08/04/19 0109 08/03/19 1909   SODIUM mmol/L 144 143 143 142 143   POTASSIUM mmol/L 3.7 3.6 3.7 4.1 4.3   MAGNESIUM mg/dL  --   --  2.2  --   --    CHLORIDE mmol/L 109* 107 104 106 102   CO2 mmol/L 23.2 23.2 24.9 21.6* 23.5   BUN mg/dL 34* 28* 21 36* 38*   CREATININE mg/dL 1.24* 1.21* 1.04* 1.29* 1.44*   EGFR IF NONAFRICN AM mL/min/1.73 41* 42* 50* 39* 34*   CALCIUM mg/dL 8.9 9.2 9.0 9.3 9.5   GLUCOSE mg/dL 110* 94 96 94 95   ALBUMIN g/dL  --   --   --  3.47* 3.89   BILIRUBIN mg/dL  --   --   --  0.4 0.4   ALK PHOS U/L  --   --   --  77 84   AST (SGOT) U/L  --   --   --  23 26   ALT (SGPT) U/L  --   --   --  16 18   Estimated Creatinine Clearance: 17.7 mL/min (A) (by C-G formula based on SCr of 1.24 mg/dL (H)).  Results from last 7 days   Lab Units 08/04/19  1156 08/04/19  0554 08/04/19  0109   TROPONIN T ng/mL <0.010 <0.010 <0.010     No results found for: HGBA1C, POCGLU  No results found for: AMMONIA    Results from last 7 days   Lab Units 08/04/19  0619   NITRITE UA  Negative   WBC UA /HPF 6-12*   BACTERIA UA /HPF None Seen   SQUAM EPITHEL UA /HPF 0-2     Blood Culture   Date Value Ref Range Status   08/03/2019 No growth  at 2 days  Preliminary   08/03/2019 No growth at 2 days  Preliminary          I have personally looked at the labs and they are summarized above.  ----------------------------------------------------------------------------------------------------------------------  Imaging Results (last 24 hours)     ** No results found for the last 24 hours. **        I have personally reviewed the radiology images and read the final radiology report.    Assessment & Plan      Assessment:  HTN Urgency  TAYLER  Dementia  Severe malnutrition  Debility      Plan:  Continue with amlodipine for the HTN. BP stable.   Cr worsening so will start on 1/2 NS IVF. Monitor closely. Encourage oral intake.   Dementia, supportive care. Not on any medications.  Severe malnutrition, Nutrition consulted. On supplement.  Debility, PT/OT on board.    Social work consulted for discharge back to nursing home. Will dc tomorrow.     palliative care consulted for the goals of care.    The patient is high risk due to the following diagnoses/reasons:  HTN Urgency, TAYLER, Dementia, Severe malnutrition, Debility    Iliana Flanagan MD  08/07/19  4:02 PM    Electronically signed by Iliana Flanagan MD at 8/7/2019  4:05 PM          Consult Notes (last 24 hours) (Notes from 8/7/2019 10:18 AM through 8/8/2019 10:18 AM)      Georgie Welch APRN at 8/7/2019  4:00 PM      Consult Orders    1. Inpatient Palliative Care MD Consult [[id number]] ordered by Iliana Flanagan MD at 08/06/19 1642                Provider, No Known  Consulting physician: Dr. Flanagan  Reason for referral Goals of Care   Chief Complaint   Patient presents with   • Cough   • Back Pain       HPI: 89 year old female patient, presented to [facility] for falls and cough/congestion.  Patient reportedly has intermittent confusion, and lives alone.  She has no children per notes, and depends on siblings.  She was found to have high blood pressure, 196/117 with need for intervention.   "She was admitted to telemetry for evaluation.  We have been consulted for Goals of Care and to assist patient and family.     Subjective:   Patient drinking some water this evening.  She is able to answer simple questions.  Denies pain, denies SOB.  She answers she is \"in the hospital\" when I ask if she knows where she is.  She does need redirection to stay on task with answering me.  No signs of distress.  No family at bedside.     Advance care planning discussed: No family present   Code Status:   Current Code Status     Date Active Code Status Order ID Comments User Context       8/3/2019 22:57 CPR [id number]  Lynda Narvaez DO ED       Questions for Current Code Status     Question Answer Comment    Code Status CPR     Medical Interventions (Level of Support Prior to Arrest) Full         Advance Directive: None on chart   Surrogate decision maker: To be determined   History reviewed. No pertinent past medical history.  History reviewed. No pertinent surgical history.    Reviewed current scheduled and prn medications for route, type, dose and frequency.    Current Facility-Administered Medications   Medication Dose Route Frequency Provider Last Rate Last Dose   • amLODIPine (NORVASC) tablet 5 mg  5 mg Oral Q24H Seth Vargas MD   5 mg at 08/07/19 0848   • heparin (porcine) 5000 UNIT/ML injection 5,000 Units  5,000 Units Subcutaneous Q12H Lynda Narvaez DO   5,000 Units at 08/07/19 0847   • hydrALAZINE (APRESOLINE) tablet 10 mg  10 mg Oral Q6H PRN Iliana Flanagan MD   10 mg at 08/07/19 0537   • melatonin tablet 5 mg  5 mg Oral Nightly Iliana Flanagan MD   5 mg at 08/06/19 2031   • nitroglycerin (NITROSTAT) SL tablet 0.4 mg  0.4 mg Sublingual Q5 Min PRN Lynda Narvaez, DO       • sodium chloride 0.9 % flush 10 mL  10 mL Intravenous PRN Asha Dillon DO       • sodium chloride 0.9 % flush 10 mL  10 mL Intravenous PRN David Rodríguez APRN       • sodium chloride 0.9 % flush 3 mL  3 " "mL Intravenous Q12H Lynda Narvaez DO   3 mL at 08/07/19 0848   • sodium chloride 0.9 % flush 3-10 mL  3-10 mL Intravenous PRN Lynda Narvaez DO            hydrALAZINE  •  nitroglycerin  •  sodium chloride  •  [COMPLETED] Insert peripheral IV **AND** sodium chloride  •  sodium chloride  No Known Allergies  Family History   Family history unknown: Yes     Social History     Socioeconomic History   • Marital status:      Spouse name: Not on file   • Number of children: Not on file   • Years of education: Not on file   • Highest education level: Not on file   Tobacco Use   • Smoking status: Never Smoker   Substance and Sexual Activity   • Alcohol use: No   • Drug use: No   • Sexual activity: Defer       Review of Systems - +/- per HPI and symptom review.      PPS: 30  /81 (BP Location: Right arm, Patient Position: Lying)   Pulse 68   Temp 97.8 °F (36.6 °C) (Oral)   Resp 18   Ht 152.4 cm (60\")   Wt 37.2 kg (82 lb)   SpO2 95%   BMI 16.01 kg/m²    37.2 kg (82 lb) Body mass index is 16.01 kg/m².  Intake & Output (last day)       08/06 0701 - 08/07 0700 08/07 0701 - 08/08 0700    P.O. 460     I.V. (mL/kg) 500 (13.4) 1000 (26.9)    Total Intake(mL/kg) 960 (25.8) 1000 (26.9)    Net +960 +1000          Urine Unmeasured Occurrence 1 x           Physical Exam:  General Appearance: No acute distress, healthy appearance  Head: Normocephalic without obvious abnormality, atraumatic  Eyes: Conjunctivae and sclerae normal, no icterus, NOE  Throat: No oral lesions, no thrush, oral mucosa moist  Neck: No adenopathy, supple trachea, midline  Back: No kyphosis present, no skin lesions, erythema or scars, no tenderness midline  Lungs: Clear to auscultation, respirations regular, even and non-labored  Heart: Regular rhythm and normal rate, normal S1  Abdomen: Normal bowel sounds, soft, non-distended, non-tender  Extremities: Moves all extremities, no redness, no cyanosis, no edema, no                    " bilateral feet sores or wounds  Pulses: Distal pulses palpable and equal bilaterally  Skin: Warm and dry, no bleeding, bruising or rash  Neurological: Alert, interactive, appropriate conversation,no myoclonus, equal hand  and strength, able to lift lower extremities off bed    Reviewed labs and diagnostic results.  No results found for: HGBA1C  Results from last 7 days   Lab Units 08/05/19  0720   WBC 10*3/mm3 8.38   HEMOGLOBIN g/dL 13.2   HEMATOCRIT % 42.0   PLATELETS 10*3/mm3 237     Results from last 7 days   Lab Units 08/07/19  0319  08/04/19  0109   SODIUM mmol/L 144   < > 142   POTASSIUM mmol/L 3.7   < > 4.1   CHLORIDE mmol/L 109*   < > 106   CO2 mmol/L 23.2   < > 21.6*   BUN mg/dL 34*   < > 36*   CREATININE mg/dL 1.24*   < > 1.29*   CALCIUM mg/dL 8.9   < > 9.3   BILIRUBIN mg/dL  --   --  0.4   ALK PHOS U/L  --   --  77   ALT (SGPT) U/L  --   --  16   AST (SGOT) U/L  --   --  23   GLUCOSE mg/dL 110*   < > 94    < > = values in this interval not displayed.     Results from last 7 days   Lab Units 08/07/19  0319   SODIUM mmol/L 144   POTASSIUM mmol/L 3.7   CHLORIDE mmol/L 109*   CO2 mmol/L 23.2   BUN mg/dL 34*   CREATININE mg/dL 1.24*   GLUCOSE mg/dL 110*   CALCIUM mg/dL 8.9     Imaging Results (last 72 hours)     ** No results found for the last 72 hours. **        Impression: 89 y.o. female admitted with hypertensive emergency, falls, and dementia vs. Age related decline.     Plan:     1.  Goals of Care   - Patient needed much redirection today with discussion. No family at bedside.  According to chart, she has no children, not .  Siblings help care for her.  Which would make the majority of her siblings the decision makers.  I will try to catch family in the AM.  According to primary note, family has been informed of need for some decisions and awaiting them to discuss.  She possibly will need nursing home placement.  I will follow up in AM and call siblings tomorrow if none at bedside.      2.   Cough   - Patient complains of cough.  Suggest scheduled cough med. Now that BP is improving.      3.  Altered mental status   - Dementia vs. Age related.  Patient is malnourished.  Likely will need NH placement if family agreeable.      We appreciate the consult and will continue to follow along and work with patient and family.        LUX Owusu  [phone]  08/07/19  5:43 PM      Time:45 minutes spent reviewing medical and medication records, assessing and examining patient, discussing with patient, answering questions, formulating a plan and documentation of care. > 50% time spent face to face       Electronically signed by Georgie Welch APRN at 8/8/2019  2:13 AM         Discharge Summary     No notes of this type exist for this encounter.        Discharge Order (From admission, onward)    Start     Ordered    08/08/19 0948  Discharge patient  Once     Expected Discharge Date:  08/08/19    Discharge Disposition:  Nursing Facility (DC - External)    Physician of Record for Attribution - Please select from Treatment Team:  VASQUEZ CISNEROS [[id number]]    Review needed by CMO to determine Physician of Record:  No       Question Answer Comment   Physician of Record for Attribution - Please select from Treatment Team VASQUEZ CISNEROS    Review needed by CMO to determine Physician of Record No        08/08/19 0948

## 2019-08-08 NOTE — DISCHARGE SUMMARY
[facility] HOSPITALISTS DISCHARGE SUMMARY    Patient Identification:  Name:  Lolis Infante  Age:  89 y.o.  Sex:  female  :  1929  MRN:  [id number]  Visit Number:  [id number]    Date of Admission: 8/3/2019  Date of Discharge:  2019     PCP: Provider, No Known    DISCHARGE DIAGNOSIS  HTN Urgency  TAYLER  Dementia  Severe malnutrition  Debility    CONSULTS   Psychiatry    PROCEDURES PERFORMED  None    HOSPITAL COURSE  Ms. Infante is a 89 yo female with no past medical history presented to [facility] complaining of back pain, cough.  Please see the admitting history and physical for further details.  Admitted with accelerated HTN, TAYLER, debility and worsening dementia.  Patient was started on IV fluids and amlodipine and patient blood pressure stabilized.  Acute kidney injury improved with IV fluids. Family wanted nursing home placement.  Psychiatric consultation done for decision-making capacity and advised emergency guardianship.  PT OT consulted and patient did ambulate.  Patient more alert and awake and oral intake slightly improved.  Social work consult for nursing home placement.  CODE STATUS was discussed with the patient's brother in detail and still the family is thinking and deciding.  Since the patient is vitally stable, improved symptomatically and has a nursing home bed, family agreeable, it was decided to discharge the patient to nursing home in stable condition.    VITAL SIGNS:  Temp:  [97.6 °F (36.4 °C)-97.8 °F (36.6 °C)] 97.7 °F (36.5 °C)  Heart Rate:  [63-73] 73  Resp:  [18-20] 18  BP: (141-160)/(79-88) 157/79  SpO2:  [95 %] 95 %  on   ;   Device (Oxygen Therapy): room air    Body mass index is 16.07 kg/m².  Wt Readings from Last 3 Encounters:   19 37.3 kg (82 lb 4.8 oz)   19 45.4 kg (100 lb)   16 47.6 kg (105 lb)       PHYSICAL EXAM:  General: Comfortable, Not in distress.  Cachectic  HENT:  Head:  Normocephalic and atraumatic.  Mouth:  Moist mucous  membranes.    Eyes:  Conjunctivae and EOM are normal.  Pupils are equal, round, and reactive to light.  No scleral icterus.    Neck:  Neck supple.  No JVD present. Trachea midline.   Cardiovascular:  Normal rate, regular rhythm with no murmur.  Pulmonary/Chest:  No respiratory distress, no wheezes, no crackles, with normal breath sounds and good air movement.  Abdomen:  Soft.  Bowel sounds are normal.  No distension and no tenderness. No organomegaly.   Musculoskeletal:  No edema, no tenderness, and no deformity.  No red or swollen joints anywhere.    Neurological:  Alert and oriented to person, place, and time.  No cranial nerve deficit. No focal deficits. No facial droop.  No slurred speech.   Skin:  Skin is warm and dry. No rash noted. No pallor.   Peripheral vascular:  pulses in all 4 extremities with no clubbing, no cyanosis, no edema.  Genitourinary: No ridley    DISCHARGE DISPOSITION   [facility]     DISCHARGE MEDICATIONS:     Discharge Medications      New Medications      Instructions Start Date   amLODIPine 5 MG tablet  Commonly known as:  NORVASC   5 mg, Oral, Every 24 Hours Scheduled      melatonin 5 MG tablet tablet   5 mg, Oral, Nightly             Diet Instructions     Diet: Regular      Discharge Diet:  Regular        Activity Instructions     Activity as Tolerated          No future appointments.    Additional Instructions for the Follow-ups that You Need to Schedule     Discharge Follow-up with PCP   As directed       Currently Documented PCP:    Provider, No Known    PCP Phone Number:    [phone]     Follow Up Details:  Within 1 week           Follow-up Information     Provider, No Known .    Why:  Within 1 week  Contact information:  [place] [address]  [phone]                    TEST  RESULTS PENDING AT DISCHARGE   Order Current Status    Blood Culture - Blood, Arm, Left Preliminary result    Blood Culture - Blood, Arm, Right Preliminary result            CODE STATUS  Code Status and Medical Interventions:   Ordered at: 08/03/19 2257     Code Status:    CPR     Medical Interventions (Level of Support Prior to Arrest):    Full       Iliana Flanagan MD  08/08/19  9:50 AM    Please note that this discharge summary required more than 30 minutes to complete.    Please send a copy of this dictation to the following providers:  Provider, No Known

## 2019-08-22 ENCOUNTER — APPOINTMENT (OUTPATIENT)
Dept: CT IMAGING | Facility: HOSPITAL | Age: 84
End: 2019-08-22

## 2019-08-22 ENCOUNTER — HOSPITAL ENCOUNTER (EMERGENCY)
Facility: HOSPITAL | Age: 84
Discharge: HOME | End: 2019-08-22
Attending: EMERGENCY MEDICINE | Admitting: EMERGENCY MEDICINE

## 2019-08-22 VITALS
DIASTOLIC BLOOD PRESSURE: 97 MMHG | HEART RATE: 80 BPM | SYSTOLIC BLOOD PRESSURE: 164 MMHG | RESPIRATION RATE: 18 BRPM | TEMPERATURE: 98 F | WEIGHT: 80.8 LBS | BODY MASS INDEX: 15.86 KG/M2 | HEIGHT: 60 IN | OXYGEN SATURATION: 98 %

## 2019-08-22 DIAGNOSIS — S01.21XA LACERATION OF NOSE, INITIAL ENCOUNTER: ICD-10-CM

## 2019-08-22 DIAGNOSIS — S02.2XXA CLOSED FRACTURE OF NASAL BONE, INITIAL ENCOUNTER: ICD-10-CM
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
## 2019-08-22 DIAGNOSIS — S22.000A CLOSED COMPRESSION FRACTURE OF THORACIC VERTEBRA, INITIAL ENCOUNTER (HCC): ICD-10-CM

## 2019-08-22 PROCEDURE — 72125 CT NECK SPINE W/O DYE: CPT

## 2019-08-22 PROCEDURE — 25010000003 LIDOCAINE 1 % SOLUTION

## 2019-08-22 PROCEDURE — 90715 TDAP VACCINE 7 YRS/> IM: CPT | Performed by: EMERGENCY MEDICINE

## 2019-08-22 PROCEDURE — 70486 CT MAXILLOFACIAL W/O DYE: CPT

## 2019-08-22 PROCEDURE — 72125 CT NECK SPINE W/O DYE: CPT | Performed by: RADIOLOGY

## 2019-08-22 PROCEDURE — 99283 EMERGENCY DEPT VISIT LOW MDM: CPT

## 2019-08-22 PROCEDURE — 25010000002 TDAP 5-2.5-18.5 LF-MCG/0.5 SUSPENSION: Performed by: EMERGENCY MEDICINE

## 2019-08-22 PROCEDURE — 90471 IMMUNIZATION ADMIN: CPT | Performed by: EMERGENCY MEDICINE

## 2019-08-22 PROCEDURE — 70450 CT HEAD/BRAIN W/O DYE: CPT

## 2019-08-22 PROCEDURE — 70486 CT MAXILLOFACIAL W/O DYE: CPT | Performed by: RADIOLOGY

## 2019-08-22 PROCEDURE — 70450 CT HEAD/BRAIN W/O DYE: CPT | Performed by: RADIOLOGY

## 2019-08-22 RX ORDER — ASPIRIN 81 MG/1
81 TABLET, CHEWABLE ORAL DAILY
COMMUNITY

## 2019-08-22 RX ORDER — AMMONIA INHALANTS 0.04 G/.3ML
INHALANT RESPIRATORY (INHALATION)
Status: DISCONTINUED
Start: 2019-08-22 | End: 2019-08-22 | Stop reason: WASHOUT

## 2019-08-22 RX ORDER — LIDOCAINE HYDROCHLORIDE 10 MG/ML
5 INJECTION, SOLUTION EPIDURAL; INFILTRATION; INTRACAUDAL; PERINEURAL ONCE
Status: DISCONTINUED | OUTPATIENT
Start: 2019-08-22 | End: 2019-08-23 | Stop reason: HOSPADM

## 2019-08-22 RX ADMIN — TETANUS TOXOID, REDUCED DIPHTHERIA TOXOID AND ACELLULAR PERTUSSIS VACCINE, ADSORBED 0.5 ML: 5; 2.5; 8; 8; 2.5 SUSPENSION INTRAMUSCULAR at 19:40

## 2019-08-22 NOTE — ED NOTES
Spoke with Teresa Infante on phone via  and obtained verbal consent to treat pt for injuries and any required treatment. At the Teresa's request I will call her back when I have disposition of pt's status.     Daina Guevara RN  08/22/19 1945

## 2019-08-22 NOTE — ED PROVIDER NOTES
Subjective   89-year-old female with history of dementia presents from nursing home after a fall.  Apparently she tripped the hallway and landed on her face.  There were no reports of other trauma.  She arrives with no signs of distress but obvious trauma to the face and bleeding from the bridge of the nose.  She is unable to provide further history due to her baseline mental status.            Review of Systems   Unable to perform ROS: Dementia       Past Medical History:   Diagnosis Date   • Cardiomegaly    • Cognitive communication deficit    • Dementia    • Dysphagia    • Gait instability    • Hypertension    • Malnutrition (CMS/HCC)    • Renal disorder        No Known Allergies    History reviewed. No pertinent surgical history.    Family History   Family history unknown: Yes       Social History     Socioeconomic History   • Marital status:      Spouse name: Not on file   • Number of children: Not on file   • Years of education: Not on file   • Highest education level: Not on file   Tobacco Use   • Smoking status: Never Smoker   Substance and Sexual Activity   • Alcohol use: No   • Drug use: No   • Sexual activity: Defer           Objective   Physical Exam   Constitutional: No distress.   Chronically ill   HENT:   Head: Normocephalic.   Right Ear: External ear normal.   Left Ear: External ear normal.   Nose: Nose normal.   Blunt nasal trauma with laceration to bridge of nose  No septal hematoma or active bleeding   Eyes: Conjunctivae and EOM are normal. Pupils are equal, round, and reactive to light.   Neck: Neck supple. No JVD present. No tracheal deviation present.   KATHY TTP   Cardiovascular: Normal rate, regular rhythm and normal heart sounds.   No murmur heard.  Pulmonary/Chest: Effort normal and breath sounds normal. No respiratory distress. She has no wheezes.   Abdominal: Soft. Bowel sounds are normal. There is no tenderness.   Musculoskeletal: Normal range of motion. She exhibits no edema,  tenderness or deformity.   In any extremity   Neurological: She is alert. She has normal strength. She is disoriented. No cranial nerve deficit or sensory deficit. GCS eye subscore is 4. GCS verbal subscore is 4. GCS motor subscore is 5.   Skin: Skin is warm and dry. No rash noted. She is not diaphoretic. No erythema. No pallor.   Psychiatric: She has a normal mood and affect. Her behavior is normal. Thought content normal.   Nursing note and vitals reviewed.      Laceration Repair  Date/Time: 8/22/2019 10:13 PM  Performed by: Johann Rivers MD  Authorized by: Johann Rivers MD     Consent:     Consent obtained:  Verbal    Consent given by:  Healthcare agent  Anesthesia (see MAR for exact dosages):     Anesthesia method:  Local infiltration    Local anesthetic:  Lidocaine 1% w/o epi  Laceration details:     Location:  Face    Face location:  Nose    Length (cm):  1.2  Repair type:     Repair type:  Simple  Exploration:     Hemostasis achieved with:  Direct pressure    Contaminated: no    Treatment:     Amount of cleaning:  Extensive    Visualized foreign bodies/material removed: no    Skin repair:     Repair method:  Sutures    Suture size:  6-0    Suture material:  Nylon    Suture technique:  Simple interrupted  Approximation:     Approximation:  Close  Post-procedure details:     Dressing:  Open (no dressing)               ED Course                  MDM  Number of Diagnoses or Management Options  Closed compression fracture of thoracic vertebra, initial encounter (CMS/[facility]):   Closed fracture of nasal bone, initial encounter:   Fall, initial encounter:   Laceration of nose, initial encounter:   Diagnosis management comments: 89 yof with dementia and frequent falls presents after a fall.  She has obvious injuries to the face and was found to have a nasal fracture but had no septal hematoma or other emergent complication.  Head CT was negative and C-spine CT showed no acute cervical fractures,  but she did have a subtle T3 compression fracture.  Family states she has frequent falls and has had spinal fractures before.  She is neurologically intact and there is no indication for further work-up of this at this time.  No intervention will be indicated.  Her laceration was repaired.  She will be discharged back to the nursing facility.  Strict return precautions were discussed.  Family expressed understanding and agreement with the plan.       Amount and/or Complexity of Data Reviewed  Tests in the radiology section of CPT®: ordered and reviewed          Final diagnoses:   Fall, initial encounter   Laceration of nose, initial encounter   Closed fracture of nasal bone, initial encounter   Closed compression fracture of thoracic vertebra, initial encounter (CMS/[facility])            Johann Rivesr MD  08/22/19 1040

## 2019-08-23 NOTE — ED NOTES
Wound to nose irrigated with normal saline and cleansed with surgical scrub.     Isidro Fields  08/22/19 2029

## 2019-08-23 NOTE — ED NOTES
Called report to Flor Infante LPN at [facility].     Daina Guevara RN  08/22/19 1709

## 2019-08-23 NOTE — ED NOTES
Called [facility] to transport patient back to [facility].     Franca Tuhrman  08/22/19 2285

## 2019-08-23 NOTE — ED NOTES
Pt condition improved. Laceration stabilized with sutures. VS stable.     08/22/19 4361   Vital Signs   Temp 98 °F (36.7 °C)   Heart Rate 80   Resp 18   /97   Oxygen Therapy   SpO2 98 %   Vitals Timer   Restart Vitals Timer Yes        Daina Guevara, RN  08/22/19 1715

## 2019-09-26 ENCOUNTER — APPOINTMENT (OUTPATIENT)
Dept: GENERAL RADIOLOGY | Facility: HOSPITAL | Age: 84
End: 2019-09-26

## 2019-09-26 ENCOUNTER — HOSPITAL ENCOUNTER (EMERGENCY)
Facility: HOSPITAL | Age: 84
Discharge: NURSING, REHAB OR LONG-TERM CARE | End: 2019-09-26
Attending: EMERGENCY MEDICINE | Admitting: EMERGENCY MEDICINE

## 2019-09-26 ENCOUNTER — APPOINTMENT (OUTPATIENT)
Dept: CT IMAGING | Facility: HOSPITAL | Age: 84
End: 2019-09-26

## 2019-09-26 VITALS
RESPIRATION RATE: 18 BRPM | SYSTOLIC BLOOD PRESSURE: 155 MMHG | TEMPERATURE: 98 F | BODY MASS INDEX: 16.99 KG/M2 | HEIGHT: 61 IN | WEIGHT: 90 LBS | DIASTOLIC BLOOD PRESSURE: 84 MMHG | HEART RATE: 71 BPM | OXYGEN SATURATION: 100 %

## 2019-09-26 DIAGNOSIS — I10 HYPERTENSION, UNSPECIFIED TYPE: ICD-10-CM

## 2019-09-26 DIAGNOSIS — M25.531 RIGHT WRIST PAIN: ICD-10-CM
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
## 2019-09-26 PROCEDURE — 73110 X-RAY EXAM OF WRIST: CPT

## 2019-09-26 PROCEDURE — 99283 EMERGENCY DEPT VISIT LOW MDM: CPT

## 2019-09-26 PROCEDURE — 70450 CT HEAD/BRAIN W/O DYE: CPT

## 2019-09-26 PROCEDURE — 73502 X-RAY EXAM HIP UNI 2-3 VIEWS: CPT

## 2019-09-26 PROCEDURE — 71111 X-RAY EXAM RIBS/CHEST4/> VWS: CPT

## 2019-09-26 RX ORDER — HYDRALAZINE HYDROCHLORIDE 20 MG/ML
10 INJECTION INTRAMUSCULAR; INTRAVENOUS ONCE
Status: DISCONTINUED | OUTPATIENT
Start: 2019-09-26 | End: 2019-09-26

## 2019-09-26 RX ORDER — SODIUM CHLORIDE 0.9 % (FLUSH) 0.9 %
10 SYRINGE (ML) INJECTION AS NEEDED
Status: DISCONTINUED | OUTPATIENT
Start: 2019-09-26 | End: 2019-09-26

## 2019-09-26 RX ORDER — AMLODIPINE BESYLATE 5 MG/1
10 TABLET ORAL ONCE
Status: COMPLETED | OUTPATIENT
Start: 2019-09-26 | End: 2019-09-26

## 2019-09-26 RX ADMIN — AMLODIPINE BESYLATE 10 MG: 5 TABLET ORAL at 01:50

## 2019-09-26 NOTE — ED NOTES
"Called [facility] for transport back to [facility]. [facility]patch states \"I'll get you a truck sent up.\"     Willy Perez  09/26/19 0407    "

## 2019-09-26 NOTE — ED NOTES
Called [facility], spoke with Marta Jackson nurse and gave report to her for the pt's return.     Daina Guevara, RN  09/26/19 2945

## 2019-09-26 NOTE — ED PROVIDER NOTES
Subjective   89-year-old female patient presents to the emergency room today by EMS for evaluation after she took a fall.  Nursing home staff reports that patient will not stay in the bed and tries to get up on her own.  Reports that she fell and landed on her right wrist.  Patient has been complaining of right wrist pain since the fall.  Nursing home staff reports that there was no head injury or loss of consciousness.        History provided by:  Patient   used: No        Review of Systems   Constitutional: Negative.    HENT: Negative.    Eyes: Negative.    Respiratory: Negative.    Cardiovascular: Negative.    Gastrointestinal: Negative.    Endocrine: Negative.    Genitourinary: Negative.    Musculoskeletal: Negative.    Allergic/Immunologic: Negative.    Neurological: Negative.    Hematological: Negative.    Psychiatric/Behavioral: Negative.    All other systems reviewed and are negative.      Past Medical History:   Diagnosis Date   • Cardiomegaly    • Cognitive communication deficit    • Dementia    • Dysphagia    • Gait instability    • Hypertension    • Malnutrition (CMS/HCC)    • Renal disorder        No Known Allergies    No past surgical history on file.    Family History   Family history unknown: Yes       Social History     Socioeconomic History   • Marital status:      Spouse name: Not on file   • Number of children: Not on file   • Years of education: Not on file   • Highest education level: Not on file   Tobacco Use   • Smoking status: Never Smoker   Substance and Sexual Activity   • Alcohol use: No   • Drug use: No   • Sexual activity: Defer           Objective   Physical Exam   Constitutional: She is oriented to person, place, and time. She appears well-developed and well-nourished.   HENT:   Head: Normocephalic and atraumatic.   Right Ear: External ear normal.   Left Ear: External ear normal.   Nose: Nose normal.   Mouth/Throat: Oropharynx is clear and moist.   Eyes:  Conjunctivae and EOM are normal. Pupils are equal, round, and reactive to light.   Neck: Normal range of motion. Neck supple.   Cardiovascular: Normal rate, regular rhythm, normal heart sounds and intact distal pulses.   Pulmonary/Chest: Effort normal and breath sounds normal.   Abdominal: Soft. Bowel sounds are normal.   Musculoskeletal: Normal range of motion.        Right wrist: She exhibits tenderness. She exhibits normal range of motion, no bony tenderness, no swelling, no effusion, no crepitus, no deformity and no laceration.   Neurological: She is alert and oriented to person, place, and time.   Skin: Skin is warm and dry.   Nursing note and vitals reviewed.      Procedures           ED Course  ED Course as of Sep 26 0437   Thu Sep 26, 2019   0245 Nursing home reports that patient BP runs high.    [ML]   0340 Patient's family is requesting her to have a x-ray of her hip, her ribs, and a CT scan of her head as she told them that she was experiencing pain in these areas from the fall.  [ML]   0436 Patient sitting up in chair eating a lunch box and drinking coffee.  No distress noted.  [ML]   0436 Discussed findings with patient and her family.  Will discharge back to the nursing home.  [ML]      ED Course User Index  [ML] Debra Srinivasan PA                  [facility]    Final diagnoses:   Fall, initial encounter   Right wrist pain   Hypertension, unspecified type              Debra Srinivasan PA  09/26/19 2302

## 2019-09-26 NOTE — ED NOTES
No change in pt condition. VS stable. Pt ready for transport back to [facility]. Report called back to nursing home previously and explained d/c instructions as well as results of all testing and medications given to improve blood pressure.     09/26/19 0442   Vital Signs   Temp 98 °F (36.7 °C)   Heart Rate 71   Resp 18   /84   Oxygen Therapy   SpO2 100 %   Vitals Timer   Restart Vitals Timer Yes   Restart Vitals Timer Yes        Daina Guevara, ROB  09/26/19 5403

## 2019-09-27 ENCOUNTER — HOSPITAL ENCOUNTER (EMERGENCY)
Facility: HOSPITAL | Age: 84
Discharge: NURSING, REHAB OR LONG-TERM CARE | End: 2019-09-27
Attending: EMERGENCY MEDICINE | Admitting: EMERGENCY MEDICINE

## 2019-09-27 ENCOUNTER — APPOINTMENT (OUTPATIENT)
Dept: CT IMAGING | Facility: HOSPITAL | Age: 84
End: 2019-09-27

## 2019-09-27 VITALS
TEMPERATURE: 98.4 F | HEART RATE: 68 BPM | HEIGHT: 60 IN | RESPIRATION RATE: 18 BRPM | DIASTOLIC BLOOD PRESSURE: 56 MMHG | BODY MASS INDEX: 17.66 KG/M2 | OXYGEN SATURATION: 98 % | WEIGHT: 89.95 LBS | SYSTOLIC BLOOD PRESSURE: 107 MMHG

## 2019-09-27 DIAGNOSIS — S60.211A CONTUSION OF RIGHT WRIST, INITIAL ENCOUNTER: ICD-10-CM

## 2019-09-27 DIAGNOSIS — S63.501A SPRAIN OF RIGHT WRIST, INITIAL ENCOUNTER: Primary | ICD-10-CM

## 2019-09-27 PROCEDURE — 99283 EMERGENCY DEPT VISIT LOW MDM: CPT

## 2019-09-27 PROCEDURE — 73200 CT UPPER EXTREMITY W/O DYE: CPT | Performed by: RADIOLOGY

## 2019-09-27 PROCEDURE — 73200 CT UPPER EXTREMITY W/O DYE: CPT

## 2019-10-01 ENCOUNTER — TRANSCRIBE ORDERS (OUTPATIENT)
Dept: ADMINISTRATIVE | Facility: HOSPITAL | Age: 84
End: 2019-10-01

## 2019-10-01 DIAGNOSIS — R13.10 DYSPHAGIA, UNSPECIFIED TYPE: Primary | ICD-10-CM

## 2019-10-04 ENCOUNTER — HOSPITAL ENCOUNTER (OUTPATIENT)
Dept: GENERAL RADIOLOGY | Facility: HOSPITAL | Age: 84
Discharge: HOME | End: 2019-10-04
Admitting: INTERNAL MEDICINE

## 2019-10-04 ENCOUNTER — APPOINTMENT (OUTPATIENT)
Dept: GENERAL RADIOLOGY | Facility: HOSPITAL | Age: 84
End: 2019-10-04

## 2019-10-04 DIAGNOSIS — R13.10 DYSPHAGIA, UNSPECIFIED TYPE: ICD-10-CM

## 2019-10-04 PROCEDURE — 74230 X-RAY XM SWLNG FUNCJ C+: CPT | Performed by: RADIOLOGY

## 2019-10-04 PROCEDURE — 74230 X-RAY XM SWLNG FUNCJ C+: CPT

## 2019-10-04 PROCEDURE — 92611 MOTION FLUOROSCOPY/SWALLOW: CPT

## 2019-10-04 NOTE — MBS/VFSS/FEES
Outpatient - Speech Language Pathology   Swallow Initial Evaluation [facility]   OUTPATIENT MODIFIED BARIUM SWALLOW STUDY     Patient Name: Lolis Infante  : 1929  MRN: [id number]  Today's Date: 10/4/2019             Admit Date: 10/4/2019    Visit Dx:     ICD-10-CM ICD-9-CM   1. Dysphagia, unspecified type R13.10 787.20     Patient Active Problem List   Diagnosis   (none) - all problems resolved or deleted     Past Medical History:   Diagnosis Date   • Cardiomegaly    • Cognitive communication deficit    • Dementia (CMS/HCC)    • Dysphagia    • Gait instability    • Hypertension    • Malnutrition (CMS/HCC)    • Renal disorder      No past surgical history on file.     Lolis Infante  presents to the radiology suite this am from [facility] to participate in an instrumental MBS to assess safety/efficacy of swallowing fnx, determine safest/least restrictive diet. She is accompanied by a facility staff member. Per SNF paperwork, pt is on thin liquids.     No recent chest xray available for review.     Pt is observed on ra w/o complications.     Risks and benefits of the procedure are explained, pt is cooperative to participate.      Pt is positioned upright and centered in a wheelchair to accept multiple po presentations of solid cracker, puree, honey thick, nectar thick, and thin liquids via spoon, cup and straw, along w/ whole placebo pill in puree. Pt is able to self feed.     All views are from the lateral plane.     Facial/oral structures are symmetrical upon observation w/o lingual deviation upon protrusion. Oral mucosa are moist, pink and clean. Secretions are clear, thin and well controlled. OROM/CLAUDIA is generally weak and delayed to imitate oral postures. Gag is not assessed. Volitional cough is adequate in intensity, slightly congestive in quality, nonproductive. Vocal quality is adequate in intensity, clear in quality w/ intelligible speech. Pt is a/a and cooperative to particpate. She is  oriented to person only, follows simple directives, and participates in simple conversational exchanges. She is pleasantly confused.     Upon po presentations, adequate bolus anticipation w/ good labial seal for bolus clearance via spoon bowl, cup rim stability and suction via straw. Bolus formation, manipulation, and control are generally weak w/ markedly increased oral prep time and piecemeal deglutition w/ solids, phasic mastication pattern. A-p transit is moderately-moderately severely delayed w/ anterior oral holding of all consistencies, no significant oral residue. Lingual pumping of puree and honey thick liquids. Tongue base retraction and linguavelar seal are adequate w/o significant premature spillage. No laryngeal penetration or aspiration is evidenced before the swallow.     Pharyngeal swallow is timely w/ adequate hyolaryngeal elevation and epiglottic inversion. Pharyngeal contraction is adequate w/o significant residue. Isolated event of laryngeal penetration during the swallow w/ thin liquid via cup, clearing w/ spontaneous consecutive swallow. Not reproducible. No other laryngeal penetration or aspiration evidenced during or after the swallow.     Pt is unable to transit whole placebo pill in puree, expelled from oral cavity.     Partial esophageal sweep reveals no mucosal abnormalities. Motility appears adequate w/o retrograde flow noted.      Impression: Per this evaluation, Ms. Infante presents w/ a moderate-moderately severe oral dysphagia, wfl pharyngeal swallow w/o evidence of aspiration across this evaluation. She is felt to most benefit from puree diet, thin liquids, assist w/ meals.     EDUCATION  The patient has been educated in the following areas:   Dysphagia (Swallowing Impairment) Modified Diet Instruction.    SLP Recommendation and Plan  1. Puree consistency diet, thin liquids.   2. Meds whole in puree, crush PRN.   3. Assist e/ meals.   4. Upright and centered for all po intake.   5.  Oral care protocol.     D/w pt and facility staff member results and recommendations w/ verbal understanding and agreement.     Thank you for allowing me to participate in the care of your patient-  Irene Infante M.A., CCC-SLP    Time Calculation:     Therapy Charges for Today     Code Description Service Date Service Provider Modifiers Qty    61980236425 HC ST MOTION FLUORO EVAL SWALLOW 8 10/4/2019 Irene Infante MA,CCC-SLP GN 1        Irene Infante MA,CCC-SLP  10/4/2019

## 2020-06-14 ENCOUNTER — LAB (OUTPATIENT)
Dept: LAB | Facility: HOSPITAL | Age: 85
End: 2020-06-14

## 2020-06-14 ENCOUNTER — TRANSCRIBE ORDERS (OUTPATIENT)
Dept: ADMINISTRATIVE | Facility: HOSPITAL | Age: 85
End: 2020-06-14

## 2020-06-14 DIAGNOSIS — Z20.828 EXPOSURE TO SARS-ASSOCIATED CORONAVIRUS: ICD-10-CM

## 2020-06-14 DIAGNOSIS — Z20.828 EXPOSURE TO SARS-ASSOCIATED CORONAVIRUS: Primary | ICD-10-CM

## 2020-06-14 PROCEDURE — U0002 COVID-19 LAB TEST NON-CDC: HCPCS

## 2020-06-14 PROCEDURE — U0004 COV-19 TEST NON-CDC HGH THRU: HCPCS

## 2020-06-14 PROCEDURE — C9803 HOPD COVID-19 SPEC COLLECT: HCPCS

## 2020-06-15 LAB
REF LAB TEST METHOD: NORMAL
SARS-COV-2 RNA RESP QL NAA+PROBE: NOT DETECTED

## 2020-07-20 ENCOUNTER — APPOINTMENT (OUTPATIENT)
Dept: GENERAL RADIOLOGY | Facility: HOSPITAL | Age: 85
End: 2020-07-20

## 2020-07-20 ENCOUNTER — HOSPITAL ENCOUNTER (INPATIENT)
Facility: HOSPITAL | Age: 85
LOS: 6 days | Discharge: NURSING, REHAB OR LONG-TERM CARE | End: 2020-07-27
Attending: EMERGENCY MEDICINE | Admitting: HOSPITALIST

## 2020-07-20 ENCOUNTER — APPOINTMENT (OUTPATIENT)
Dept: CT IMAGING | Facility: HOSPITAL | Age: 85
End: 2020-07-20

## 2020-07-20 DIAGNOSIS — J18.9 PNEUMONIA OF RIGHT MIDDLE LOBE DUE TO INFECTIOUS ORGANISM: ICD-10-CM

## 2020-07-20 DIAGNOSIS — A41.9 SEPSIS, DUE TO UNSPECIFIED ORGANISM, UNSPECIFIED WHETHER ACUTE ORGAN DYSFUNCTION PRESENT (HCC): Primary | ICD-10-CM

## 2020-07-20 LAB
A-A DO2: 41.7 MMHG (ref 0–300)
ALBUMIN SERPL-MCNC: 3.36 G/DL (ref 3.5–5.2)
ALBUMIN/GLOB SERPL: 0.9 G/DL
ALP SERPL-CCNC: 71 U/L (ref 39–117)
ALT SERPL W P-5'-P-CCNC: 7 U/L (ref 1–33)
ANION GAP SERPL CALCULATED.3IONS-SCNC: 13.1 MMOL/L (ref 5–15)
ARTERIAL PATENCY WRIST A: POSITIVE
AST SERPL-CCNC: 13 U/L (ref 1–32)
ATMOSPHERIC PRESS: 728 MMHG
BASE EXCESS BLDA CALC-SCNC: 1.5 MMOL/L (ref 0–2)
BASOPHILS # BLD AUTO: 0.05 10*3/MM3 (ref 0–0.2)
BASOPHILS NFR BLD AUTO: 0.3 % (ref 0–1.5)
BDY SITE: ABNORMAL
BILIRUB SERPL-MCNC: 0.3 MG/DL (ref 0–1.2)
BODY TEMPERATURE: 0 C
BUN SERPL-MCNC: 28 MG/DL (ref 8–23)
BUN/CREAT SERPL: 35 (ref 7–25)
CALCIUM SPEC-SCNC: 9 MG/DL (ref 8.2–9.6)
CHLORIDE SERPL-SCNC: 102 MMOL/L (ref 98–107)
CO2 BLDA-SCNC: 26.2 MMOL/L (ref 22–33)
CO2 SERPL-SCNC: 23.9 MMOL/L (ref 22–29)
COHGB MFR BLD: 1 % (ref 0–5)
CREAT SERPL-MCNC: 0.8 MG/DL (ref 0.57–1)
D-LACTATE SERPL-SCNC: 1 MMOL/L (ref 0.5–2)
DEPRECATED RDW RBC AUTO: 53.9 FL (ref 37–54)
EOSINOPHIL # BLD AUTO: 0.79 10*3/MM3 (ref 0–0.4)
EOSINOPHIL NFR BLD AUTO: 4.6 % (ref 0.3–6.2)
ERYTHROCYTE [DISTWIDTH] IN BLOOD BY AUTOMATED COUNT: 15.5 % (ref 12.3–15.4)
FERRITIN SERPL-MCNC: 103.8 NG/ML (ref 13–150)
GFR SERPL CREATININE-BSD FRML MDRD: 67 ML/MIN/1.73
GLOBULIN UR ELPH-MCNC: 3.9 GM/DL
GLUCOSE SERPL-MCNC: 120 MG/DL (ref 65–99)
HCO3 BLDA-SCNC: 25.1 MMOL/L (ref 20–26)
HCT VFR BLD AUTO: 37.1 % (ref 34–46.6)
HCT VFR BLD CALC: 37.5 % (ref 38–51)
HGB BLD-MCNC: 11.7 G/DL (ref 12–15.9)
HGB BLDA-MCNC: 12.2 G/DL (ref 13.5–17.5)
IMM GRANULOCYTES # BLD AUTO: 0.07 10*3/MM3 (ref 0–0.05)
IMM GRANULOCYTES NFR BLD AUTO: 0.4 % (ref 0–0.5)
INHALED O2 CONCENTRATION: 21 %
LDH SERPL-CCNC: 196 U/L (ref 135–214)
LYMPHOCYTES # BLD AUTO: 1.6 10*3/MM3 (ref 0.7–3.1)
LYMPHOCYTES NFR BLD AUTO: 9.4 % (ref 19.6–45.3)
Lab: ABNORMAL
MCH RBC QN AUTO: 29.6 PG (ref 26.6–33)
MCHC RBC AUTO-ENTMCNC: 31.5 G/DL (ref 31.5–35.7)
MCV RBC AUTO: 93.9 FL (ref 79–97)
METHGB BLD QL: 0.3 % (ref 0–3)
MODALITY: ABNORMAL
MONOCYTES # BLD AUTO: 1.57 10*3/MM3 (ref 0.1–0.9)
MONOCYTES NFR BLD AUTO: 9.2 % (ref 5–12)
NEUTROPHILS NFR BLD AUTO: 12.99 10*3/MM3 (ref 1.7–7)
NEUTROPHILS NFR BLD AUTO: 76.1 % (ref 42.7–76)
NOTE: ABNORMAL
NOTIFIED BY: ABNORMAL
NOTIFIED WHO: ABNORMAL
NRBC BLD AUTO-RTO: 0 /100 WBC (ref 0–0.2)
NT-PROBNP SERPL-MCNC: 1410 PG/ML (ref 0–1800)
OXYHGB MFR BLDV: 92 % (ref 94–99)
PCO2 BLDA: 35.6 MM HG (ref 35–45)
PCO2 TEMP ADJ BLD: ABNORMAL MM[HG]
PH BLDA: 7.46 PH UNITS (ref 7.35–7.45)
PH, TEMP CORRECTED: ABNORMAL
PLATELET # BLD AUTO: 312 10*3/MM3 (ref 140–450)
PMV BLD AUTO: 9.3 FL (ref 6–12)
PO2 BLDA: 61.2 MM HG (ref 83–108)
PO2 TEMP ADJ BLD: ABNORMAL MM[HG]
POTASSIUM SERPL-SCNC: 4.2 MMOL/L (ref 3.5–5.2)
PROT SERPL-MCNC: 7.3 G/DL (ref 6–8.5)
RBC # BLD AUTO: 3.95 10*6/MM3 (ref 3.77–5.28)
SAO2 % BLDCOA: 93.2 % (ref 94–99)
SARS-COV-2 RDRP RESP QL NAA+PROBE: NOT DETECTED
SODIUM SERPL-SCNC: 139 MMOL/L (ref 136–145)
TROPONIN T SERPL-MCNC: <0.01 NG/ML (ref 0–0.03)
VENTILATOR MODE: ABNORMAL
WBC # BLD AUTO: 17.07 10*3/MM3 (ref 3.4–10.8)

## 2020-07-20 PROCEDURE — 82375 ASSAY CARBOXYHB QUANT: CPT

## 2020-07-20 PROCEDURE — 71045 X-RAY EXAM CHEST 1 VIEW: CPT

## 2020-07-20 PROCEDURE — 36600 WITHDRAWAL OF ARTERIAL BLOOD: CPT

## 2020-07-20 PROCEDURE — 83605 ASSAY OF LACTIC ACID: CPT | Performed by: PHYSICIAN ASSISTANT

## 2020-07-20 PROCEDURE — 71250 CT THORAX DX C-: CPT

## 2020-07-20 PROCEDURE — 85025 COMPLETE CBC W/AUTO DIFF WBC: CPT | Performed by: PHYSICIAN ASSISTANT

## 2020-07-20 PROCEDURE — P9612 CATHETERIZE FOR URINE SPEC: HCPCS

## 2020-07-20 PROCEDURE — 93005 ELECTROCARDIOGRAM TRACING: CPT | Performed by: PHYSICIAN ASSISTANT

## 2020-07-20 PROCEDURE — 93010 ELECTROCARDIOGRAM REPORT: CPT | Performed by: INTERNAL MEDICINE

## 2020-07-20 PROCEDURE — 83050 HGB METHEMOGLOBIN QUAN: CPT

## 2020-07-20 PROCEDURE — 70450 CT HEAD/BRAIN W/O DYE: CPT

## 2020-07-20 PROCEDURE — 82728 ASSAY OF FERRITIN: CPT | Performed by: PHYSICIAN ASSISTANT

## 2020-07-20 PROCEDURE — 83880 ASSAY OF NATRIURETIC PEPTIDE: CPT | Performed by: PHYSICIAN ASSISTANT

## 2020-07-20 PROCEDURE — 84484 ASSAY OF TROPONIN QUANT: CPT | Performed by: PHYSICIAN ASSISTANT

## 2020-07-20 PROCEDURE — 82805 BLOOD GASES W/O2 SATURATION: CPT

## 2020-07-20 PROCEDURE — 99284 EMERGENCY DEPT VISIT MOD MDM: CPT

## 2020-07-20 PROCEDURE — 80053 COMPREHEN METABOLIC PANEL: CPT | Performed by: PHYSICIAN ASSISTANT

## 2020-07-20 PROCEDURE — 84145 PROCALCITONIN (PCT): CPT | Performed by: PHYSICIAN ASSISTANT

## 2020-07-20 PROCEDURE — 87040 BLOOD CULTURE FOR BACTERIA: CPT | Performed by: PHYSICIAN ASSISTANT

## 2020-07-20 PROCEDURE — 83615 LACTATE (LD) (LDH) ENZYME: CPT | Performed by: PHYSICIAN ASSISTANT

## 2020-07-20 PROCEDURE — 87635 SARS-COV-2 COVID-19 AMP PRB: CPT | Performed by: PHYSICIAN ASSISTANT

## 2020-07-20 RX ORDER — SODIUM CHLORIDE 0.9 % (FLUSH) 0.9 %
10 SYRINGE (ML) INJECTION AS NEEDED
Status: DISCONTINUED | OUTPATIENT
Start: 2020-07-20 | End: 2020-07-27 | Stop reason: HOSPADM

## 2020-07-21 ENCOUNTER — APPOINTMENT (OUTPATIENT)
Dept: GENERAL RADIOLOGY | Facility: HOSPITAL | Age: 85
End: 2020-07-21

## 2020-07-21 PROBLEM — A41.9 SEPSIS DUE TO PNEUMONIA (HCC): Status: ACTIVE | Noted: 2020-07-21

## 2020-07-21 PROBLEM — N18.30 CHRONIC KIDNEY DISEASE (CKD), STAGE III (MODERATE) (HCC): Chronic | Status: ACTIVE | Noted: 2020-07-21

## 2020-07-21 PROBLEM — E44.1 MILD MALNUTRITION (HCC): Chronic | Status: ACTIVE | Noted: 2020-07-21

## 2020-07-21 PROBLEM — J18.9 SEPSIS DUE TO PNEUMONIA (HCC): Status: ACTIVE | Noted: 2020-07-21

## 2020-07-21 PROBLEM — I10 ESSENTIAL HYPERTENSION: Chronic | Status: ACTIVE | Noted: 2020-07-21

## 2020-07-21 PROBLEM — F03.90 DEMENTIA (HCC): Chronic | Status: ACTIVE | Noted: 2020-07-21

## 2020-07-21 PROBLEM — R54 ADVANCED AGE: Chronic | Status: ACTIVE | Noted: 2020-07-21

## 2020-07-21 LAB
ALBUMIN SERPL-MCNC: 2.88 G/DL (ref 3.5–5.2)
ALBUMIN/GLOB SERPL: 0.8 G/DL
ALP SERPL-CCNC: 78 U/L (ref 39–117)
ALT SERPL W P-5'-P-CCNC: 6 U/L (ref 1–33)
ANION GAP SERPL CALCULATED.3IONS-SCNC: 11.5 MMOL/L (ref 5–15)
AST SERPL-CCNC: 12 U/L (ref 1–32)
B PERT DNA SPEC QL NAA+PROBE: NOT DETECTED
BASOPHILS # BLD AUTO: 0.03 10*3/MM3 (ref 0–0.2)
BASOPHILS NFR BLD AUTO: 0.2 % (ref 0–1.5)
BILIRUB SERPL-MCNC: 0.4 MG/DL (ref 0–1.2)
BILIRUB UR QL STRIP: NEGATIVE
BUN SERPL-MCNC: 23 MG/DL (ref 8–23)
BUN/CREAT SERPL: 29.1 (ref 7–25)
C PNEUM DNA NPH QL NAA+NON-PROBE: NOT DETECTED
CALCIUM SPEC-SCNC: 8.5 MG/DL (ref 8.2–9.6)
CHLORIDE SERPL-SCNC: 105 MMOL/L (ref 98–107)
CLARITY UR: CLEAR
CO2 SERPL-SCNC: 25.5 MMOL/L (ref 22–29)
COLOR UR: YELLOW
CREAT SERPL-MCNC: 0.79 MG/DL (ref 0.57–1)
CRP SERPL-MCNC: 6.89 MG/DL (ref 0–0.5)
DEPRECATED RDW RBC AUTO: 55.7 FL (ref 37–54)
EOSINOPHIL # BLD AUTO: 0.63 10*3/MM3 (ref 0–0.4)
EOSINOPHIL NFR BLD AUTO: 3.7 % (ref 0.3–6.2)
ERYTHROCYTE [DISTWIDTH] IN BLOOD BY AUTOMATED COUNT: 15.6 % (ref 12.3–15.4)
FLUAV H1 2009 PAND RNA NPH QL NAA+PROBE: NOT DETECTED
FLUAV H1 HA GENE NPH QL NAA+PROBE: NOT DETECTED
FLUAV H3 RNA NPH QL NAA+PROBE: NOT DETECTED
FLUAV SUBTYP SPEC NAA+PROBE: NOT DETECTED
FLUBV RNA ISLT QL NAA+PROBE: NOT DETECTED
FOLATE SERPL-MCNC: 14.7 NG/ML (ref 4.78–24.2)
GFR SERPL CREATININE-BSD FRML MDRD: 68 ML/MIN/1.73
GLOBULIN UR ELPH-MCNC: 3.4 GM/DL
GLUCOSE SERPL-MCNC: 123 MG/DL (ref 65–99)
GLUCOSE UR STRIP-MCNC: NEGATIVE MG/DL
HADV DNA SPEC NAA+PROBE: NOT DETECTED
HBA1C MFR BLD: 5.5 % (ref 4.8–5.6)
HCOV 229E RNA SPEC QL NAA+PROBE: NOT DETECTED
HCOV HKU1 RNA SPEC QL NAA+PROBE: NOT DETECTED
HCOV NL63 RNA SPEC QL NAA+PROBE: NOT DETECTED
HCOV OC43 RNA SPEC QL NAA+PROBE: NOT DETECTED
HCT VFR BLD AUTO: 33.2 % (ref 34–46.6)
HGB BLD-MCNC: 10.2 G/DL (ref 12–15.9)
HGB UR QL STRIP.AUTO: NEGATIVE
HMPV RNA NPH QL NAA+NON-PROBE: NOT DETECTED
HPIV1 RNA SPEC QL NAA+PROBE: NOT DETECTED
HPIV2 RNA SPEC QL NAA+PROBE: NOT DETECTED
HPIV3 RNA NPH QL NAA+PROBE: NOT DETECTED
HPIV4 P GENE NPH QL NAA+PROBE: NOT DETECTED
IMM GRANULOCYTES # BLD AUTO: 0.07 10*3/MM3 (ref 0–0.05)
IMM GRANULOCYTES NFR BLD AUTO: 0.4 % (ref 0–0.5)
IRON 24H UR-MRATE: 18 MCG/DL (ref 37–145)
IRON SATN MFR SERPL: 8 % (ref 20–50)
KETONES UR QL STRIP: NEGATIVE
LEUKOCYTE ESTERASE UR QL STRIP.AUTO: NEGATIVE
LYMPHOCYTES # BLD AUTO: 1.89 10*3/MM3 (ref 0.7–3.1)
LYMPHOCYTES NFR BLD AUTO: 11 % (ref 19.6–45.3)
M PNEUMO IGG SER IA-ACNC: NOT DETECTED
MAGNESIUM SERPL-MCNC: 2.1 MG/DL (ref 1.7–2.3)
MCH RBC QN AUTO: 29.6 PG (ref 26.6–33)
MCHC RBC AUTO-ENTMCNC: 30.7 G/DL (ref 31.5–35.7)
MCV RBC AUTO: 96.2 FL (ref 79–97)
MONOCYTES # BLD AUTO: 1.88 10*3/MM3 (ref 0.1–0.9)
MONOCYTES NFR BLD AUTO: 10.9 % (ref 5–12)
MRSA DNA SPEC QL NAA+PROBE: NEGATIVE
NEUTROPHILS NFR BLD AUTO: 12.72 10*3/MM3 (ref 1.7–7)
NEUTROPHILS NFR BLD AUTO: 73.8 % (ref 42.7–76)
NITRITE UR QL STRIP: NEGATIVE
NRBC BLD AUTO-RTO: 0 /100 WBC (ref 0–0.2)
PH UR STRIP.AUTO: 5.5 [PH] (ref 5–8)
PHOSPHATE SERPL-MCNC: 3.2 MG/DL (ref 2.5–4.5)
PLATELET # BLD AUTO: 273 10*3/MM3 (ref 140–450)
PMV BLD AUTO: 9.5 FL (ref 6–12)
POTASSIUM SERPL-SCNC: 4.1 MMOL/L (ref 3.5–5.2)
PROCALCITONIN SERPL-MCNC: 0.08 NG/ML (ref 0–0.25)
PROT SERPL-MCNC: 6.3 G/DL (ref 6–8.5)
PROT UR QL STRIP: ABNORMAL
RBC # BLD AUTO: 3.45 10*6/MM3 (ref 3.77–5.28)
RHINOVIRUS RNA SPEC NAA+PROBE: NOT DETECTED
RSV RNA NPH QL NAA+NON-PROBE: NOT DETECTED
S AUREUS DNA SPEC QL NAA+PROBE: NEGATIVE
SODIUM SERPL-SCNC: 142 MMOL/L (ref 136–145)
SP GR UR STRIP: 1.02 (ref 1–1.03)
TIBC SERPL-MCNC: 234 MCG/DL (ref 298–536)
TRANSFERRIN SERPL-MCNC: 157 MG/DL (ref 200–360)
TROPONIN T SERPL-MCNC: <0.01 NG/ML (ref 0–0.03)
TROPONIN T SERPL-MCNC: <0.01 NG/ML (ref 0–0.03)
TSH SERPL DL<=0.05 MIU/L-ACNC: 1.03 UIU/ML (ref 0.27–4.2)
UROBILINOGEN UR QL STRIP: ABNORMAL
VIT B12 BLD-MCNC: 296 PG/ML (ref 211–946)
WBC # BLD AUTO: 17.22 10*3/MM3 (ref 3.4–10.8)

## 2020-07-21 PROCEDURE — 82746 ASSAY OF FOLIC ACID SERUM: CPT | Performed by: PHYSICIAN ASSISTANT

## 2020-07-21 PROCEDURE — 84466 ASSAY OF TRANSFERRIN: CPT | Performed by: PHYSICIAN ASSISTANT

## 2020-07-21 PROCEDURE — 74230 X-RAY XM SWLNG FUNCJ C+: CPT

## 2020-07-21 PROCEDURE — 25010000002 PIPERACILLIN SOD-TAZOBACTAM PER 1 G: Performed by: HOSPITALIST

## 2020-07-21 PROCEDURE — 0099U HC BIOFIRE FILMARRAY RESP PANEL 1: CPT | Performed by: HOSPITALIST

## 2020-07-21 PROCEDURE — 84100 ASSAY OF PHOSPHORUS: CPT | Performed by: PHYSICIAN ASSISTANT

## 2020-07-21 PROCEDURE — 80053 COMPREHEN METABOLIC PANEL: CPT | Performed by: HOSPITALIST

## 2020-07-21 PROCEDURE — 86140 C-REACTIVE PROTEIN: CPT | Performed by: HOSPITALIST

## 2020-07-21 PROCEDURE — 92611 MOTION FLUOROSCOPY/SWALLOW: CPT

## 2020-07-21 PROCEDURE — 83540 ASSAY OF IRON: CPT | Performed by: PHYSICIAN ASSISTANT

## 2020-07-21 PROCEDURE — 87641 MR-STAPH DNA AMP PROBE: CPT | Performed by: HOSPITALIST

## 2020-07-21 PROCEDURE — 25010000002 VANCOMYCIN: Performed by: PHYSICIAN ASSISTANT

## 2020-07-21 PROCEDURE — 74230 X-RAY XM SWLNG FUNCJ C+: CPT | Performed by: RADIOLOGY

## 2020-07-21 PROCEDURE — 25010000002 HEPARIN (PORCINE) PER 1000 UNITS: Performed by: HOSPITALIST

## 2020-07-21 PROCEDURE — 84484 ASSAY OF TROPONIN QUANT: CPT | Performed by: PHYSICIAN ASSISTANT

## 2020-07-21 PROCEDURE — 83735 ASSAY OF MAGNESIUM: CPT | Performed by: PHYSICIAN ASSISTANT

## 2020-07-21 PROCEDURE — 81003 URINALYSIS AUTO W/O SCOPE: CPT | Performed by: PHYSICIAN ASSISTANT

## 2020-07-21 PROCEDURE — 87640 STAPH A DNA AMP PROBE: CPT | Performed by: HOSPITALIST

## 2020-07-21 PROCEDURE — 84443 ASSAY THYROID STIM HORMONE: CPT | Performed by: PHYSICIAN ASSISTANT

## 2020-07-21 PROCEDURE — 83036 HEMOGLOBIN GLYCOSYLATED A1C: CPT | Performed by: PHYSICIAN ASSISTANT

## 2020-07-21 PROCEDURE — 25010000002 PIPERACILLIN SOD-TAZOBACTAM PER 1 G: Performed by: PHYSICIAN ASSISTANT

## 2020-07-21 PROCEDURE — 82607 VITAMIN B-12: CPT | Performed by: PHYSICIAN ASSISTANT

## 2020-07-21 PROCEDURE — 92610 EVALUATE SWALLOWING FUNCTION: CPT

## 2020-07-21 PROCEDURE — 94799 UNLISTED PULMONARY SVC/PX: CPT

## 2020-07-21 PROCEDURE — 99223 1ST HOSP IP/OBS HIGH 75: CPT | Performed by: PHYSICIAN ASSISTANT

## 2020-07-21 PROCEDURE — 85025 COMPLETE CBC W/AUTO DIFF WBC: CPT | Performed by: HOSPITALIST

## 2020-07-21 RX ORDER — CETIRIZINE HYDROCHLORIDE 10 MG/1
5 TABLET ORAL DAILY
Status: DISCONTINUED | OUTPATIENT
Start: 2020-07-21 | End: 2020-07-27 | Stop reason: HOSPADM

## 2020-07-21 RX ORDER — MONTELUKAST SODIUM 10 MG/1
10 TABLET ORAL NIGHTLY
Status: ON HOLD | COMMUNITY
End: 2021-01-01

## 2020-07-21 RX ORDER — IPRATROPIUM BROMIDE AND ALBUTEROL SULFATE 2.5; .5 MG/3ML; MG/3ML
3 SOLUTION RESPIRATORY (INHALATION) EVERY 4 HOURS PRN
Status: DISCONTINUED | OUTPATIENT
Start: 2020-07-21 | End: 2020-07-25

## 2020-07-21 RX ORDER — CHOLECALCIFEROL (VITAMIN D3) 125 MCG
5 CAPSULE ORAL NIGHTLY PRN
Status: DISCONTINUED | OUTPATIENT
Start: 2020-07-21 | End: 2020-07-27 | Stop reason: HOSPADM

## 2020-07-21 RX ORDER — AMLODIPINE BESYLATE 10 MG/1
10 TABLET ORAL DAILY
Status: CANCELLED | OUTPATIENT
Start: 2020-07-21

## 2020-07-21 RX ORDER — ASPIRIN 81 MG/1
81 TABLET, CHEWABLE ORAL DAILY
Status: DISCONTINUED | OUTPATIENT
Start: 2020-07-21 | End: 2020-07-27 | Stop reason: HOSPADM

## 2020-07-21 RX ORDER — BISACODYL 10 MG
10 SUPPOSITORY, RECTAL RECTAL DAILY PRN
COMMUNITY

## 2020-07-21 RX ORDER — BUSPIRONE HYDROCHLORIDE 15 MG/1
7.5 TABLET ORAL 2 TIMES DAILY
COMMUNITY
End: 2020-07-27 | Stop reason: HOSPADM

## 2020-07-21 RX ORDER — IPRATROPIUM BROMIDE AND ALBUTEROL SULFATE 2.5; .5 MG/3ML; MG/3ML
3 SOLUTION RESPIRATORY (INHALATION)
Status: DISCONTINUED | OUTPATIENT
Start: 2020-07-21 | End: 2020-07-21

## 2020-07-21 RX ORDER — BUSPIRONE HYDROCHLORIDE 5 MG/1
5 TABLET ORAL EVERY 12 HOURS SCHEDULED
Status: DISCONTINUED | OUTPATIENT
Start: 2020-07-21 | End: 2020-07-27 | Stop reason: HOSPADM

## 2020-07-21 RX ORDER — HEPARIN SODIUM 5000 [USP'U]/ML
5000 INJECTION, SOLUTION INTRAVENOUS; SUBCUTANEOUS EVERY 12 HOURS SCHEDULED
Status: DISCONTINUED | OUTPATIENT
Start: 2020-07-21 | End: 2020-07-23

## 2020-07-21 RX ORDER — MULTIVITAMIN
1 TABLET ORAL DAILY
Status: DISCONTINUED | OUTPATIENT
Start: 2020-07-21 | End: 2020-07-27 | Stop reason: HOSPADM

## 2020-07-21 RX ORDER — SENNA PLUS 8.6 MG/1
1 TABLET ORAL DAILY PRN
Status: CANCELLED | OUTPATIENT
Start: 2020-07-21

## 2020-07-21 RX ORDER — BISACODYL 10 MG
10 SUPPOSITORY, RECTAL RECTAL DAILY PRN
Status: CANCELLED | OUTPATIENT
Start: 2020-07-21

## 2020-07-21 RX ORDER — LORATADINE 10 MG/1
10 TABLET ORAL DAILY
Status: ON HOLD | COMMUNITY
End: 2021-01-01

## 2020-07-21 RX ORDER — ACETAMINOPHEN 500 MG
500 TABLET ORAL EVERY 6 HOURS PRN
COMMUNITY

## 2020-07-21 RX ORDER — CETIRIZINE HYDROCHLORIDE 10 MG/1
5 TABLET ORAL DAILY
Status: CANCELLED | OUTPATIENT
Start: 2020-07-21

## 2020-07-21 RX ORDER — SODIUM CHLORIDE 0.9 % (FLUSH) 0.9 %
10 SYRINGE (ML) INJECTION AS NEEDED
Status: DISCONTINUED | OUTPATIENT
Start: 2020-07-21 | End: 2020-07-27 | Stop reason: HOSPADM

## 2020-07-21 RX ORDER — AMLODIPINE BESYLATE 5 MG/1
5 TABLET ORAL
Status: DISCONTINUED | OUTPATIENT
Start: 2020-07-21 | End: 2020-07-27 | Stop reason: HOSPADM

## 2020-07-21 RX ORDER — SENNA PLUS 8.6 MG/1
1 TABLET ORAL DAILY PRN
COMMUNITY

## 2020-07-21 RX ORDER — SODIUM CHLORIDE 0.9 % (FLUSH) 0.9 %
10 SYRINGE (ML) INJECTION EVERY 12 HOURS SCHEDULED
Status: DISCONTINUED | OUTPATIENT
Start: 2020-07-21 | End: 2020-07-27 | Stop reason: HOSPADM

## 2020-07-21 RX ORDER — SODIUM CHLORIDE 9 MG/ML
50 INJECTION, SOLUTION INTRAVENOUS CONTINUOUS
Status: DISCONTINUED | OUTPATIENT
Start: 2020-07-21 | End: 2020-07-23

## 2020-07-21 RX ORDER — ACETAMINOPHEN 500 MG
500 TABLET ORAL EVERY 6 HOURS PRN
Status: CANCELLED | OUTPATIENT
Start: 2020-07-21

## 2020-07-21 RX ORDER — AMLODIPINE BESYLATE 10 MG/1
10 TABLET ORAL DAILY
Status: ON HOLD | COMMUNITY
End: 2020-07-27 | Stop reason: SDUPTHER

## 2020-07-21 RX ORDER — HYDRALAZINE HYDROCHLORIDE 20 MG/ML
10 INJECTION INTRAMUSCULAR; INTRAVENOUS EVERY 6 HOURS PRN
Status: DISCONTINUED | OUTPATIENT
Start: 2020-07-21 | End: 2020-07-27 | Stop reason: HOSPADM

## 2020-07-21 RX ORDER — L.ACID,PARA/B.BIFIDUM/S.THERM 8B CELL
1 CAPSULE ORAL DAILY
Status: DISCONTINUED | OUTPATIENT
Start: 2020-07-21 | End: 2020-07-27 | Stop reason: HOSPADM

## 2020-07-21 RX ORDER — BUSPIRONE HYDROCHLORIDE 5 MG/1
7.5 TABLET ORAL 2 TIMES DAILY
Status: CANCELLED | OUTPATIENT
Start: 2020-07-21

## 2020-07-21 RX ORDER — ASPIRIN 81 MG/1
81 TABLET, CHEWABLE ORAL DAILY
Status: CANCELLED | OUTPATIENT
Start: 2020-07-21

## 2020-07-21 RX ORDER — PANTOPRAZOLE SODIUM 40 MG/1
40 TABLET, DELAYED RELEASE ORAL
Status: DISCONTINUED | OUTPATIENT
Start: 2020-07-21 | End: 2020-07-27 | Stop reason: HOSPADM

## 2020-07-21 RX ORDER — ACETAMINOPHEN 325 MG/1
650 TABLET ORAL EVERY 6 HOURS PRN
Status: DISCONTINUED | OUTPATIENT
Start: 2020-07-21 | End: 2020-07-27 | Stop reason: HOSPADM

## 2020-07-21 RX ADMIN — VANCOMYCIN HYDROCHLORIDE 750 MG: 1 INJECTION, POWDER, LYOPHILIZED, FOR SOLUTION INTRAVENOUS at 03:45

## 2020-07-21 RX ADMIN — PIPERACILLIN SODIUM AND TAZOBACTAM SODIUM 3.38 G: 3; .375 INJECTION, POWDER, LYOPHILIZED, FOR SOLUTION INTRAVENOUS at 08:10

## 2020-07-21 RX ADMIN — SODIUM CHLORIDE, PRESERVATIVE FREE 10 ML: 5 INJECTION INTRAVENOUS at 08:10

## 2020-07-21 RX ADMIN — SODIUM CHLORIDE 100 ML/HR: 9 INJECTION, SOLUTION INTRAVENOUS at 16:36

## 2020-07-21 RX ADMIN — ASPIRIN 81 MG: 81 TABLET, CHEWABLE ORAL at 17:26

## 2020-07-21 RX ADMIN — HEPARIN SODIUM 5000 UNITS: 5000 INJECTION INTRAVENOUS; SUBCUTANEOUS at 08:10

## 2020-07-21 RX ADMIN — PIPERACILLIN SODIUM AND TAZOBACTAM SODIUM 3.38 G: 3; .375 INJECTION, POWDER, LYOPHILIZED, FOR SOLUTION INTRAVENOUS at 16:36

## 2020-07-21 RX ADMIN — BUSPIRONE HYDROCHLORIDE 5 MG: 5 TABLET ORAL at 21:46

## 2020-07-21 RX ADMIN — HEPARIN SODIUM 5000 UNITS: 5000 INJECTION INTRAVENOUS; SUBCUTANEOUS at 21:46

## 2020-07-21 RX ADMIN — SODIUM CHLORIDE 100 ML/HR: 9 INJECTION, SOLUTION INTRAVENOUS at 04:56

## 2020-07-21 RX ADMIN — AMLODIPINE BESYLATE 5 MG: 5 TABLET ORAL at 17:26

## 2020-07-21 RX ADMIN — CETIRIZINE HYDROCHLORIDE 5 MG: 10 TABLET, FILM COATED ORAL at 17:26

## 2020-07-22 PROCEDURE — 94799 UNLISTED PULMONARY SVC/PX: CPT

## 2020-07-22 PROCEDURE — 25010000002 PIPERACILLIN SOD-TAZOBACTAM PER 1 G: Performed by: HOSPITALIST

## 2020-07-22 PROCEDURE — 99231 SBSQ HOSP IP/OBS SF/LOW 25: CPT | Performed by: INTERNAL MEDICINE

## 2020-07-22 PROCEDURE — 25010000002 HEPARIN (PORCINE) PER 1000 UNITS: Performed by: HOSPITALIST

## 2020-07-22 PROCEDURE — 92610 EVALUATE SWALLOWING FUNCTION: CPT

## 2020-07-22 RX ORDER — MONTELUKAST SODIUM 10 MG/1
10 TABLET ORAL NIGHTLY
Status: DISCONTINUED | OUTPATIENT
Start: 2020-07-22 | End: 2020-07-27 | Stop reason: HOSPADM

## 2020-07-22 RX ORDER — CHOLECALCIFEROL (VITAMIN D3) 125 MCG
5 CAPSULE ORAL NIGHTLY
Status: DISCONTINUED | OUTPATIENT
Start: 2020-07-22 | End: 2020-07-27 | Stop reason: HOSPADM

## 2020-07-22 RX ADMIN — CETIRIZINE HYDROCHLORIDE 5 MG: 10 TABLET, FILM COATED ORAL at 08:58

## 2020-07-22 RX ADMIN — MONTELUKAST SODIUM 10 MG: 10 TABLET, COATED ORAL at 21:04

## 2020-07-22 RX ADMIN — PIPERACILLIN SODIUM AND TAZOBACTAM SODIUM 3.38 G: 3; .375 INJECTION, POWDER, LYOPHILIZED, FOR SOLUTION INTRAVENOUS at 09:40

## 2020-07-22 RX ADMIN — BUSPIRONE HYDROCHLORIDE 5 MG: 5 TABLET ORAL at 21:05

## 2020-07-22 RX ADMIN — Medication 1 CAPSULE: at 08:58

## 2020-07-22 RX ADMIN — PANTOPRAZOLE SODIUM 40 MG: 40 TABLET, DELAYED RELEASE ORAL at 05:54

## 2020-07-22 RX ADMIN — BUSPIRONE HYDROCHLORIDE 5 MG: 5 TABLET ORAL at 08:58

## 2020-07-22 RX ADMIN — SODIUM CHLORIDE, PRESERVATIVE FREE 10 ML: 5 INJECTION INTRAVENOUS at 09:00

## 2020-07-22 RX ADMIN — ASPIRIN 81 MG: 81 TABLET, CHEWABLE ORAL at 08:58

## 2020-07-22 RX ADMIN — Medication 5 MG: at 21:05

## 2020-07-22 RX ADMIN — HEPARIN SODIUM 5000 UNITS: 5000 INJECTION INTRAVENOUS; SUBCUTANEOUS at 21:05

## 2020-07-22 RX ADMIN — AMLODIPINE BESYLATE 5 MG: 5 TABLET ORAL at 08:58

## 2020-07-22 RX ADMIN — SODIUM CHLORIDE 50 ML/HR: 9 INJECTION, SOLUTION INTRAVENOUS at 16:52

## 2020-07-22 RX ADMIN — Medication 1 TABLET: at 08:58

## 2020-07-22 RX ADMIN — HEPARIN SODIUM 5000 UNITS: 5000 INJECTION INTRAVENOUS; SUBCUTANEOUS at 08:59

## 2020-07-22 RX ADMIN — PIPERACILLIN SODIUM AND TAZOBACTAM SODIUM 3.38 G: 3; .375 INJECTION, POWDER, LYOPHILIZED, FOR SOLUTION INTRAVENOUS at 01:07

## 2020-07-22 RX ADMIN — PIPERACILLIN SODIUM AND TAZOBACTAM SODIUM 3.38 G: 3; .375 INJECTION, POWDER, LYOPHILIZED, FOR SOLUTION INTRAVENOUS at 16:37

## 2020-07-23 LAB
ALBUMIN SERPL-MCNC: 2.39 G/DL (ref 3.5–5.2)
ALBUMIN/GLOB SERPL: 0.7 G/DL
ALP SERPL-CCNC: 59 U/L (ref 39–117)
ALT SERPL W P-5'-P-CCNC: 6 U/L (ref 1–33)
ANION GAP SERPL CALCULATED.3IONS-SCNC: 9.9 MMOL/L (ref 5–15)
AST SERPL-CCNC: 11 U/L (ref 1–32)
BASOPHILS # BLD AUTO: 0.02 10*3/MM3 (ref 0–0.2)
BASOPHILS NFR BLD AUTO: 0.2 % (ref 0–1.5)
BILIRUB SERPL-MCNC: 0.2 MG/DL (ref 0–1.2)
BUN SERPL-MCNC: 17 MG/DL (ref 8–23)
BUN/CREAT SERPL: 19.1 (ref 7–25)
CALCIUM SPEC-SCNC: 8.3 MG/DL (ref 8.2–9.6)
CHLORIDE SERPL-SCNC: 110 MMOL/L (ref 98–107)
CO2 SERPL-SCNC: 22.1 MMOL/L (ref 22–29)
CREAT SERPL-MCNC: 0.89 MG/DL (ref 0.57–1)
CRP SERPL-MCNC: 7.52 MG/DL (ref 0–0.5)
DEPRECATED RDW RBC AUTO: 54.8 FL (ref 37–54)
EOSINOPHIL # BLD AUTO: 1.19 10*3/MM3 (ref 0–0.4)
EOSINOPHIL NFR BLD AUTO: 10.4 % (ref 0.3–6.2)
ERYTHROCYTE [DISTWIDTH] IN BLOOD BY AUTOMATED COUNT: 15.6 % (ref 12.3–15.4)
GFR SERPL CREATININE-BSD FRML MDRD: 59 ML/MIN/1.73
GLOBULIN UR ELPH-MCNC: 3.6 GM/DL
GLUCOSE SERPL-MCNC: 108 MG/DL (ref 65–99)
HCT VFR BLD AUTO: 31.5 % (ref 34–46.6)
HGB BLD-MCNC: 9.9 G/DL (ref 12–15.9)
IMM GRANULOCYTES # BLD AUTO: 0.04 10*3/MM3 (ref 0–0.05)
IMM GRANULOCYTES NFR BLD AUTO: 0.3 % (ref 0–0.5)
LYMPHOCYTES # BLD AUTO: 1.74 10*3/MM3 (ref 0.7–3.1)
LYMPHOCYTES NFR BLD AUTO: 15.2 % (ref 19.6–45.3)
MAGNESIUM SERPL-MCNC: 2.2 MG/DL (ref 1.7–2.3)
MCH RBC QN AUTO: 29.9 PG (ref 26.6–33)
MCHC RBC AUTO-ENTMCNC: 31.4 G/DL (ref 31.5–35.7)
MCV RBC AUTO: 95.2 FL (ref 79–97)
MONOCYTES # BLD AUTO: 1.21 10*3/MM3 (ref 0.1–0.9)
MONOCYTES NFR BLD AUTO: 10.6 % (ref 5–12)
NEUTROPHILS NFR BLD AUTO: 63.3 % (ref 42.7–76)
NEUTROPHILS NFR BLD AUTO: 7.25 10*3/MM3 (ref 1.7–7)
NRBC BLD AUTO-RTO: 0 /100 WBC (ref 0–0.2)
PHOSPHATE SERPL-MCNC: 2.8 MG/DL (ref 2.5–4.5)
PLATELET # BLD AUTO: 289 10*3/MM3 (ref 140–450)
PMV BLD AUTO: 9.3 FL (ref 6–12)
POTASSIUM SERPL-SCNC: 3.9 MMOL/L (ref 3.5–5.2)
PROT SERPL-MCNC: 6 G/DL (ref 6–8.5)
RBC # BLD AUTO: 3.31 10*6/MM3 (ref 3.77–5.28)
SODIUM SERPL-SCNC: 142 MMOL/L (ref 136–145)
WBC # BLD AUTO: 11.45 10*3/MM3 (ref 3.4–10.8)

## 2020-07-23 PROCEDURE — 84100 ASSAY OF PHOSPHORUS: CPT | Performed by: INTERNAL MEDICINE

## 2020-07-23 PROCEDURE — 99232 SBSQ HOSP IP/OBS MODERATE 35: CPT | Performed by: INTERNAL MEDICINE

## 2020-07-23 PROCEDURE — 94799 UNLISTED PULMONARY SVC/PX: CPT

## 2020-07-23 PROCEDURE — 85025 COMPLETE CBC W/AUTO DIFF WBC: CPT | Performed by: INTERNAL MEDICINE

## 2020-07-23 PROCEDURE — 83735 ASSAY OF MAGNESIUM: CPT | Performed by: INTERNAL MEDICINE

## 2020-07-23 PROCEDURE — 92610 EVALUATE SWALLOWING FUNCTION: CPT

## 2020-07-23 PROCEDURE — 86140 C-REACTIVE PROTEIN: CPT | Performed by: INTERNAL MEDICINE

## 2020-07-23 PROCEDURE — 80053 COMPREHEN METABOLIC PANEL: CPT | Performed by: INTERNAL MEDICINE

## 2020-07-23 PROCEDURE — 25010000002 PIPERACILLIN SOD-TAZOBACTAM PER 1 G: Performed by: HOSPITALIST

## 2020-07-23 PROCEDURE — 25010000002 HEPARIN (PORCINE) PER 1000 UNITS: Performed by: HOSPITALIST

## 2020-07-23 RX ADMIN — HEPARIN SODIUM 5000 UNITS: 5000 INJECTION INTRAVENOUS; SUBCUTANEOUS at 09:45

## 2020-07-23 RX ADMIN — AMLODIPINE BESYLATE 5 MG: 5 TABLET ORAL at 09:45

## 2020-07-23 RX ADMIN — SODIUM CHLORIDE, PRESERVATIVE FREE 10 ML: 5 INJECTION INTRAVENOUS at 09:45

## 2020-07-23 RX ADMIN — CETIRIZINE HYDROCHLORIDE 5 MG: 10 TABLET, FILM COATED ORAL at 09:45

## 2020-07-23 RX ADMIN — Medication 1 CAPSULE: at 09:45

## 2020-07-23 RX ADMIN — Medication 5 MG: at 20:12

## 2020-07-23 RX ADMIN — BUSPIRONE HYDROCHLORIDE 5 MG: 5 TABLET ORAL at 09:45

## 2020-07-23 RX ADMIN — Medication 1 TABLET: at 09:45

## 2020-07-23 RX ADMIN — PIPERACILLIN SODIUM AND TAZOBACTAM SODIUM 3.38 G: 3; .375 INJECTION, POWDER, LYOPHILIZED, FOR SOLUTION INTRAVENOUS at 09:45

## 2020-07-23 RX ADMIN — PIPERACILLIN SODIUM AND TAZOBACTAM SODIUM 3.38 G: 3; .375 INJECTION, POWDER, LYOPHILIZED, FOR SOLUTION INTRAVENOUS at 01:12

## 2020-07-23 RX ADMIN — BUSPIRONE HYDROCHLORIDE 5 MG: 5 TABLET ORAL at 20:12

## 2020-07-23 RX ADMIN — MONTELUKAST SODIUM 10 MG: 10 TABLET, COATED ORAL at 20:12

## 2020-07-23 RX ADMIN — ASPIRIN 81 MG: 81 TABLET, CHEWABLE ORAL at 09:45

## 2020-07-23 RX ADMIN — PIPERACILLIN SODIUM AND TAZOBACTAM SODIUM 3.38 G: 3; .375 INJECTION, POWDER, LYOPHILIZED, FOR SOLUTION INTRAVENOUS at 16:56

## 2020-07-23 RX ADMIN — PANTOPRAZOLE SODIUM 40 MG: 40 TABLET, DELAYED RELEASE ORAL at 05:53

## 2020-07-24 ENCOUNTER — APPOINTMENT (OUTPATIENT)
Dept: GENERAL RADIOLOGY | Facility: HOSPITAL | Age: 85
End: 2020-07-24

## 2020-07-24 LAB
ANION GAP SERPL CALCULATED.3IONS-SCNC: 12 MMOL/L (ref 5–15)
BASOPHILS # BLD AUTO: 0.04 10*3/MM3 (ref 0–0.2)
BASOPHILS NFR BLD AUTO: 0.4 % (ref 0–1.5)
BUN SERPL-MCNC: 19 MG/DL (ref 8–23)
BUN/CREAT SERPL: 23.8 (ref 7–25)
CALCIUM SPEC-SCNC: 8.6 MG/DL (ref 8.2–9.6)
CHLORIDE SERPL-SCNC: 109 MMOL/L (ref 98–107)
CO2 SERPL-SCNC: 23 MMOL/L (ref 22–29)
CREAT SERPL-MCNC: 0.8 MG/DL (ref 0.57–1)
CRP SERPL-MCNC: 5.86 MG/DL (ref 0–0.5)
DEPRECATED RDW RBC AUTO: 55 FL (ref 37–54)
EOSINOPHIL # BLD AUTO: 1.17 10*3/MM3 (ref 0–0.4)
EOSINOPHIL NFR BLD AUTO: 10.6 % (ref 0.3–6.2)
ERYTHROCYTE [DISTWIDTH] IN BLOOD BY AUTOMATED COUNT: 15.4 % (ref 12.3–15.4)
GFR SERPL CREATININE-BSD FRML MDRD: 67 ML/MIN/1.73
GLUCOSE SERPL-MCNC: 113 MG/DL (ref 65–99)
HCT VFR BLD AUTO: 34.4 % (ref 34–46.6)
HGB BLD-MCNC: 10.5 G/DL (ref 12–15.9)
IMM GRANULOCYTES # BLD AUTO: 0.1 10*3/MM3 (ref 0–0.05)
IMM GRANULOCYTES NFR BLD AUTO: 0.9 % (ref 0–0.5)
LYMPHOCYTES # BLD AUTO: 2.09 10*3/MM3 (ref 0.7–3.1)
LYMPHOCYTES NFR BLD AUTO: 18.8 % (ref 19.6–45.3)
MCH RBC QN AUTO: 29.5 PG (ref 26.6–33)
MCHC RBC AUTO-ENTMCNC: 30.5 G/DL (ref 31.5–35.7)
MCV RBC AUTO: 96.6 FL (ref 79–97)
MONOCYTES # BLD AUTO: 1.15 10*3/MM3 (ref 0.1–0.9)
MONOCYTES NFR BLD AUTO: 10.4 % (ref 5–12)
NEUTROPHILS NFR BLD AUTO: 58.9 % (ref 42.7–76)
NEUTROPHILS NFR BLD AUTO: 6.54 10*3/MM3 (ref 1.7–7)
NRBC BLD AUTO-RTO: 0 /100 WBC (ref 0–0.2)
PLATELET # BLD AUTO: 327 10*3/MM3 (ref 140–450)
PMV BLD AUTO: 9.3 FL (ref 6–12)
POTASSIUM SERPL-SCNC: 4.5 MMOL/L (ref 3.5–5.2)
RBC # BLD AUTO: 3.56 10*6/MM3 (ref 3.77–5.28)
SODIUM SERPL-SCNC: 144 MMOL/L (ref 136–145)
WBC # BLD AUTO: 11.09 10*3/MM3 (ref 3.4–10.8)

## 2020-07-24 PROCEDURE — 99231 SBSQ HOSP IP/OBS SF/LOW 25: CPT | Performed by: INTERNAL MEDICINE

## 2020-07-24 PROCEDURE — 86140 C-REACTIVE PROTEIN: CPT | Performed by: INTERNAL MEDICINE

## 2020-07-24 PROCEDURE — 80048 BASIC METABOLIC PNL TOTAL CA: CPT | Performed by: INTERNAL MEDICINE

## 2020-07-24 PROCEDURE — 94799 UNLISTED PULMONARY SVC/PX: CPT

## 2020-07-24 PROCEDURE — 71045 X-RAY EXAM CHEST 1 VIEW: CPT

## 2020-07-24 PROCEDURE — 85025 COMPLETE CBC W/AUTO DIFF WBC: CPT | Performed by: INTERNAL MEDICINE

## 2020-07-24 PROCEDURE — 71045 X-RAY EXAM CHEST 1 VIEW: CPT | Performed by: RADIOLOGY

## 2020-07-24 PROCEDURE — 25010000002 PIPERACILLIN SOD-TAZOBACTAM PER 1 G: Performed by: HOSPITALIST

## 2020-07-24 RX ORDER — AMOXICILLIN AND CLAVULANATE POTASSIUM 500; 125 MG/1; MG/1
1 TABLET, FILM COATED ORAL EVERY 12 HOURS SCHEDULED
Status: DISCONTINUED | OUTPATIENT
Start: 2020-07-24 | End: 2020-07-25

## 2020-07-24 RX ADMIN — MONTELUKAST SODIUM 10 MG: 10 TABLET, COATED ORAL at 22:59

## 2020-07-24 RX ADMIN — SODIUM CHLORIDE, PRESERVATIVE FREE 10 ML: 5 INJECTION INTRAVENOUS at 22:58

## 2020-07-24 RX ADMIN — AMOXICILLIN AND CLAVULANATE POTASSIUM 500 MG: 500; 125 TABLET, FILM COATED ORAL at 22:59

## 2020-07-24 RX ADMIN — AMLODIPINE BESYLATE 5 MG: 5 TABLET ORAL at 08:00

## 2020-07-24 RX ADMIN — BUSPIRONE HYDROCHLORIDE 5 MG: 5 TABLET ORAL at 22:59

## 2020-07-24 RX ADMIN — ASPIRIN 81 MG: 81 TABLET, CHEWABLE ORAL at 08:00

## 2020-07-24 RX ADMIN — PIPERACILLIN SODIUM AND TAZOBACTAM SODIUM 3.38 G: 3; .375 INJECTION, POWDER, LYOPHILIZED, FOR SOLUTION INTRAVENOUS at 08:00

## 2020-07-24 RX ADMIN — PANTOPRAZOLE SODIUM 40 MG: 40 TABLET, DELAYED RELEASE ORAL at 05:36

## 2020-07-24 RX ADMIN — PIPERACILLIN SODIUM AND TAZOBACTAM SODIUM 3.38 G: 3; .375 INJECTION, POWDER, LYOPHILIZED, FOR SOLUTION INTRAVENOUS at 01:38

## 2020-07-24 RX ADMIN — Medication 1 TABLET: at 08:00

## 2020-07-24 RX ADMIN — SODIUM CHLORIDE, PRESERVATIVE FREE 10 ML: 5 INJECTION INTRAVENOUS at 08:00

## 2020-07-24 RX ADMIN — Medication 5 MG: at 22:59

## 2020-07-24 RX ADMIN — CETIRIZINE HYDROCHLORIDE 5 MG: 10 TABLET, FILM COATED ORAL at 08:00

## 2020-07-24 RX ADMIN — BUSPIRONE HYDROCHLORIDE 5 MG: 5 TABLET ORAL at 08:00

## 2020-07-24 RX ADMIN — PIPERACILLIN SODIUM AND TAZOBACTAM SODIUM 3.38 G: 3; .375 INJECTION, POWDER, LYOPHILIZED, FOR SOLUTION INTRAVENOUS at 16:00

## 2020-07-24 RX ADMIN — Medication 1 CAPSULE: at 08:00

## 2020-07-25 LAB
ALBUMIN SERPL-MCNC: 3.1 G/DL (ref 3.5–5.2)
ALBUMIN/GLOB SERPL: 0.8 G/DL
ALP SERPL-CCNC: 77 U/L (ref 39–117)
ALT SERPL W P-5'-P-CCNC: 15 U/L (ref 1–33)
ANION GAP SERPL CALCULATED.3IONS-SCNC: 12.6 MMOL/L (ref 5–15)
AST SERPL-CCNC: 25 U/L (ref 1–32)
BACTERIA SPEC AEROBE CULT: NORMAL
BACTERIA SPEC AEROBE CULT: NORMAL
BASOPHILS # BLD AUTO: 0.03 10*3/MM3 (ref 0–0.2)
BASOPHILS NFR BLD AUTO: 0.3 % (ref 0–1.5)
BILIRUB SERPL-MCNC: 0.3 MG/DL (ref 0–1.2)
BUN SERPL-MCNC: 17 MG/DL (ref 8–23)
BUN/CREAT SERPL: 24.6 (ref 7–25)
CALCIUM SPEC-SCNC: 9.1 MG/DL (ref 8.2–9.6)
CHLORIDE SERPL-SCNC: 105 MMOL/L (ref 98–107)
CO2 SERPL-SCNC: 26.4 MMOL/L (ref 22–29)
CREAT SERPL-MCNC: 0.69 MG/DL (ref 0.57–1)
CRP SERPL-MCNC: 6.47 MG/DL (ref 0–0.5)
DEPRECATED RDW RBC AUTO: 53.3 FL (ref 37–54)
EOSINOPHIL # BLD AUTO: 0.91 10*3/MM3 (ref 0–0.4)
EOSINOPHIL NFR BLD AUTO: 9 % (ref 0.3–6.2)
ERYTHROCYTE [DISTWIDTH] IN BLOOD BY AUTOMATED COUNT: 15.3 % (ref 12.3–15.4)
GFR SERPL CREATININE-BSD FRML MDRD: 80 ML/MIN/1.73
GLOBULIN UR ELPH-MCNC: 4 GM/DL
GLUCOSE SERPL-MCNC: 91 MG/DL (ref 65–99)
HCT VFR BLD AUTO: 35.6 % (ref 34–46.6)
HGB BLD-MCNC: 11 G/DL (ref 12–15.9)
IMM GRANULOCYTES # BLD AUTO: 0.07 10*3/MM3 (ref 0–0.05)
IMM GRANULOCYTES NFR BLD AUTO: 0.7 % (ref 0–0.5)
LYMPHOCYTES # BLD AUTO: 1.87 10*3/MM3 (ref 0.7–3.1)
LYMPHOCYTES NFR BLD AUTO: 18.4 % (ref 19.6–45.3)
MCH RBC QN AUTO: 29.3 PG (ref 26.6–33)
MCHC RBC AUTO-ENTMCNC: 30.9 G/DL (ref 31.5–35.7)
MCV RBC AUTO: 94.7 FL (ref 79–97)
MONOCYTES # BLD AUTO: 0.86 10*3/MM3 (ref 0.1–0.9)
MONOCYTES NFR BLD AUTO: 8.5 % (ref 5–12)
NEUTROPHILS NFR BLD AUTO: 6.42 10*3/MM3 (ref 1.7–7)
NEUTROPHILS NFR BLD AUTO: 63.1 % (ref 42.7–76)
NRBC BLD AUTO-RTO: 0 /100 WBC (ref 0–0.2)
PLATELET # BLD AUTO: 350 10*3/MM3 (ref 140–450)
PMV BLD AUTO: 9.1 FL (ref 6–12)
POTASSIUM SERPL-SCNC: 4.2 MMOL/L (ref 3.5–5.2)
PROT SERPL-MCNC: 7.1 G/DL (ref 6–8.5)
RBC # BLD AUTO: 3.76 10*6/MM3 (ref 3.77–5.28)
SODIUM SERPL-SCNC: 144 MMOL/L (ref 136–145)
WBC # BLD AUTO: 10.16 10*3/MM3 (ref 3.4–10.8)

## 2020-07-25 PROCEDURE — 99233 SBSQ HOSP IP/OBS HIGH 50: CPT | Performed by: INTERNAL MEDICINE

## 2020-07-25 PROCEDURE — 94799 UNLISTED PULMONARY SVC/PX: CPT

## 2020-07-25 PROCEDURE — 86140 C-REACTIVE PROTEIN: CPT | Performed by: INTERNAL MEDICINE

## 2020-07-25 PROCEDURE — 80053 COMPREHEN METABOLIC PANEL: CPT | Performed by: INTERNAL MEDICINE

## 2020-07-25 PROCEDURE — 85025 COMPLETE CBC W/AUTO DIFF WBC: CPT | Performed by: INTERNAL MEDICINE

## 2020-07-25 RX ORDER — HEPARIN SODIUM 5000 [USP'U]/ML
5000 INJECTION, SOLUTION INTRAVENOUS; SUBCUTANEOUS EVERY 12 HOURS SCHEDULED
Status: DISCONTINUED | OUTPATIENT
Start: 2020-07-26 | End: 2020-07-27 | Stop reason: HOSPADM

## 2020-07-25 RX ADMIN — ASPIRIN 81 MG: 81 TABLET, CHEWABLE ORAL at 08:56

## 2020-07-25 RX ADMIN — BUSPIRONE HYDROCHLORIDE 5 MG: 5 TABLET ORAL at 19:18

## 2020-07-25 RX ADMIN — AMOXICILLIN AND CLAVULANATE POTASSIUM 500 MG: 500; 125 TABLET, FILM COATED ORAL at 08:56

## 2020-07-25 RX ADMIN — BUSPIRONE HYDROCHLORIDE 5 MG: 5 TABLET ORAL at 08:56

## 2020-07-25 RX ADMIN — MONTELUKAST SODIUM 10 MG: 10 TABLET, COATED ORAL at 19:18

## 2020-07-25 RX ADMIN — Medication 5 MG: at 19:18

## 2020-07-25 RX ADMIN — AMOXICILLIN AND CLAVULANATE POTASSIUM 500 MG: 500; 125 TABLET, FILM COATED ORAL at 19:18

## 2020-07-25 RX ADMIN — AMLODIPINE BESYLATE 5 MG: 5 TABLET ORAL at 08:56

## 2020-07-25 RX ADMIN — PANTOPRAZOLE SODIUM 40 MG: 40 TABLET, DELAYED RELEASE ORAL at 05:12

## 2020-07-25 RX ADMIN — SODIUM CHLORIDE, PRESERVATIVE FREE 10 ML: 5 INJECTION INTRAVENOUS at 08:56

## 2020-07-25 RX ADMIN — CETIRIZINE HYDROCHLORIDE 5 MG: 10 TABLET, FILM COATED ORAL at 08:56

## 2020-07-25 RX ADMIN — Medication 1 CAPSULE: at 08:56

## 2020-07-25 RX ADMIN — Medication 1 TABLET: at 08:56

## 2020-07-26 PROCEDURE — 25010000002 HEPARIN (PORCINE) PER 1000 UNITS: Performed by: INTERNAL MEDICINE

## 2020-07-26 PROCEDURE — 99232 SBSQ HOSP IP/OBS MODERATE 35: CPT | Performed by: INTERNAL MEDICINE

## 2020-07-26 RX ORDER — DONEPEZIL HYDROCHLORIDE 5 MG/1
5 TABLET, FILM COATED ORAL NIGHTLY
Status: DISCONTINUED | OUTPATIENT
Start: 2020-07-26 | End: 2020-07-27 | Stop reason: HOSPADM

## 2020-07-26 RX ADMIN — MONTELUKAST SODIUM 10 MG: 10 TABLET, COATED ORAL at 20:13

## 2020-07-26 RX ADMIN — DONEPEZIL HYDROCHLORIDE 5 MG: 5 TABLET, FILM COATED ORAL at 20:13

## 2020-07-26 RX ADMIN — Medication 1 CAPSULE: at 08:17

## 2020-07-26 RX ADMIN — BUSPIRONE HYDROCHLORIDE 5 MG: 5 TABLET ORAL at 08:17

## 2020-07-26 RX ADMIN — PANTOPRAZOLE SODIUM 40 MG: 40 TABLET, DELAYED RELEASE ORAL at 04:33

## 2020-07-26 RX ADMIN — SODIUM CHLORIDE, PRESERVATIVE FREE 10 ML: 5 INJECTION INTRAVENOUS at 20:13

## 2020-07-26 RX ADMIN — METRONIDAZOLE 500 MG: 500 INJECTION, SOLUTION INTRAVENOUS at 20:13

## 2020-07-26 RX ADMIN — CETIRIZINE HYDROCHLORIDE 5 MG: 10 TABLET, FILM COATED ORAL at 08:17

## 2020-07-26 RX ADMIN — BUSPIRONE HYDROCHLORIDE 5 MG: 5 TABLET ORAL at 20:13

## 2020-07-26 RX ADMIN — HEPARIN SODIUM 5000 UNITS: 5000 INJECTION, SOLUTION INTRAVENOUS; SUBCUTANEOUS at 08:17

## 2020-07-26 RX ADMIN — HEPARIN SODIUM 5000 UNITS: 5000 INJECTION, SOLUTION INTRAVENOUS; SUBCUTANEOUS at 20:12

## 2020-07-26 RX ADMIN — ASPIRIN 81 MG: 81 TABLET, CHEWABLE ORAL at 08:17

## 2020-07-26 RX ADMIN — Medication 5 MG: at 20:13

## 2020-07-26 RX ADMIN — METRONIDAZOLE 500 MG: 500 INJECTION, SOLUTION INTRAVENOUS at 10:31

## 2020-07-26 RX ADMIN — HEPARIN SODIUM 5000 UNITS: 5000 INJECTION, SOLUTION INTRAVENOUS; SUBCUTANEOUS at 01:35

## 2020-07-26 RX ADMIN — Medication 1 TABLET: at 08:17

## 2020-07-27 VITALS
HEIGHT: 60 IN | HEART RATE: 67 BPM | BODY MASS INDEX: 18.4 KG/M2 | OXYGEN SATURATION: 95 % | DIASTOLIC BLOOD PRESSURE: 67 MMHG | WEIGHT: 93.7 LBS | TEMPERATURE: 97.7 F | RESPIRATION RATE: 18 BRPM | SYSTOLIC BLOOD PRESSURE: 134 MMHG

## 2020-07-27 PROBLEM — A41.9 SEPSIS DUE TO PNEUMONIA (HCC): Status: RESOLVED | Noted: 2020-07-21 | Resolved: 2020-07-27

## 2020-07-27 PROBLEM — J18.9 SEPSIS DUE TO PNEUMONIA (HCC): Status: RESOLVED | Noted: 2020-07-21 | Resolved: 2020-07-27

## 2020-07-27 LAB
ANION GAP SERPL CALCULATED.3IONS-SCNC: 10.9 MMOL/L (ref 5–15)
BASOPHILS # BLD AUTO: 0.04 10*3/MM3 (ref 0–0.2)
BASOPHILS NFR BLD AUTO: 0.4 % (ref 0–1.5)
BUN SERPL-MCNC: 19 MG/DL (ref 8–23)
BUN/CREAT SERPL: 23.5 (ref 7–25)
CALCIUM SPEC-SCNC: 8.6 MG/DL (ref 8.2–9.6)
CHLORIDE SERPL-SCNC: 105 MMOL/L (ref 98–107)
CO2 SERPL-SCNC: 24.1 MMOL/L (ref 22–29)
CREAT SERPL-MCNC: 0.81 MG/DL (ref 0.57–1)
CRP SERPL-MCNC: 1.87 MG/DL (ref 0–0.5)
DEPRECATED RDW RBC AUTO: 52.6 FL (ref 37–54)
EOSINOPHIL # BLD AUTO: 0.98 10*3/MM3 (ref 0–0.4)
EOSINOPHIL NFR BLD AUTO: 9 % (ref 0.3–6.2)
ERYTHROCYTE [DISTWIDTH] IN BLOOD BY AUTOMATED COUNT: 15.1 % (ref 12.3–15.4)
GFR SERPL CREATININE-BSD FRML MDRD: 66 ML/MIN/1.73
GLUCOSE SERPL-MCNC: 105 MG/DL (ref 65–99)
HCT VFR BLD AUTO: 34.2 % (ref 34–46.6)
HGB BLD-MCNC: 10.6 G/DL (ref 12–15.9)
IMM GRANULOCYTES # BLD AUTO: 0.1 10*3/MM3 (ref 0–0.05)
IMM GRANULOCYTES NFR BLD AUTO: 0.9 % (ref 0–0.5)
LYMPHOCYTES # BLD AUTO: 2.05 10*3/MM3 (ref 0.7–3.1)
LYMPHOCYTES NFR BLD AUTO: 18.8 % (ref 19.6–45.3)
MCH RBC QN AUTO: 29.3 PG (ref 26.6–33)
MCHC RBC AUTO-ENTMCNC: 31 G/DL (ref 31.5–35.7)
MCV RBC AUTO: 94.5 FL (ref 79–97)
MONOCYTES # BLD AUTO: 0.77 10*3/MM3 (ref 0.1–0.9)
MONOCYTES NFR BLD AUTO: 7.1 % (ref 5–12)
NEUTROPHILS NFR BLD AUTO: 6.94 10*3/MM3 (ref 1.7–7)
NEUTROPHILS NFR BLD AUTO: 63.8 % (ref 42.7–76)
NRBC BLD AUTO-RTO: 0 /100 WBC (ref 0–0.2)
PLATELET # BLD AUTO: 334 10*3/MM3 (ref 140–450)
PMV BLD AUTO: 8.7 FL (ref 6–12)
POTASSIUM SERPL-SCNC: 3.5 MMOL/L (ref 3.5–5.2)
RBC # BLD AUTO: 3.62 10*6/MM3 (ref 3.77–5.28)
SODIUM SERPL-SCNC: 140 MMOL/L (ref 136–145)
WBC # BLD AUTO: 10.88 10*3/MM3 (ref 3.4–10.8)

## 2020-07-27 PROCEDURE — 25010000002 HEPARIN (PORCINE) PER 1000 UNITS: Performed by: INTERNAL MEDICINE

## 2020-07-27 PROCEDURE — 86140 C-REACTIVE PROTEIN: CPT | Performed by: INTERNAL MEDICINE

## 2020-07-27 PROCEDURE — 85025 COMPLETE CBC W/AUTO DIFF WBC: CPT | Performed by: INTERNAL MEDICINE

## 2020-07-27 PROCEDURE — 94799 UNLISTED PULMONARY SVC/PX: CPT

## 2020-07-27 PROCEDURE — 99239 HOSP IP/OBS DSCHRG MGMT >30: CPT | Performed by: INTERNAL MEDICINE

## 2020-07-27 PROCEDURE — 80048 BASIC METABOLIC PNL TOTAL CA: CPT | Performed by: INTERNAL MEDICINE

## 2020-07-27 PROCEDURE — 25010000002 CEFTRIAXONE PER 250 MG: Performed by: INTERNAL MEDICINE

## 2020-07-27 RX ORDER — L.ACID,PARA/B.BIFIDUM/S.THERM 8B CELL
1 CAPSULE ORAL DAILY
Qty: 5 CAPSULE | Refills: 0 | Status: SHIPPED | OUTPATIENT
Start: 2020-07-28 | End: 2020-08-02

## 2020-07-27 RX ORDER — DONEPEZIL HYDROCHLORIDE 5 MG/1
5 TABLET, FILM COATED ORAL NIGHTLY
Qty: 30 TABLET | Refills: 0 | Status: SHIPPED | OUTPATIENT
Start: 2020-07-27

## 2020-07-27 RX ORDER — METRONIDAZOLE 500 MG/1
500 TABLET ORAL 3 TIMES DAILY
Qty: 15 TABLET | Refills: 0 | Status: SHIPPED | OUTPATIENT
Start: 2020-07-27 | End: 2020-08-01

## 2020-07-27 RX ORDER — BUSPIRONE HYDROCHLORIDE 5 MG/1
5 TABLET ORAL EVERY 12 HOURS SCHEDULED
Qty: 60 TABLET | Refills: 0 | Status: SHIPPED | OUTPATIENT
Start: 2020-07-27

## 2020-07-27 RX ORDER — AMLODIPINE BESYLATE 10 MG/1
5 TABLET ORAL DAILY
Qty: 30 TABLET | Refills: 0 | Status: ON HOLD | OUTPATIENT
Start: 2020-07-27 | End: 2021-01-01

## 2020-07-27 RX ORDER — FAMOTIDINE 20 MG/1
20 TABLET, FILM COATED ORAL DAILY
Qty: 30 TABLET | Refills: 0 | Status: SHIPPED | OUTPATIENT
Start: 2020-07-27

## 2020-07-27 RX ORDER — CEFDINIR 300 MG/1
300 CAPSULE ORAL 2 TIMES DAILY
Qty: 10 CAPSULE | Refills: 0 | Status: SHIPPED | OUTPATIENT
Start: 2020-07-27 | End: 2020-08-01

## 2020-07-27 RX ADMIN — BUSPIRONE HYDROCHLORIDE 5 MG: 5 TABLET ORAL at 08:18

## 2020-07-27 RX ADMIN — METRONIDAZOLE 500 MG: 500 INJECTION, SOLUTION INTRAVENOUS at 11:01

## 2020-07-27 RX ADMIN — PANTOPRAZOLE SODIUM 40 MG: 40 TABLET, DELAYED RELEASE ORAL at 05:48

## 2020-07-27 RX ADMIN — ASPIRIN 81 MG: 81 TABLET, CHEWABLE ORAL at 08:18

## 2020-07-27 RX ADMIN — Medication 1 CAPSULE: at 08:18

## 2020-07-27 RX ADMIN — METRONIDAZOLE 500 MG: 500 INJECTION, SOLUTION INTRAVENOUS at 05:00

## 2020-07-27 RX ADMIN — CETIRIZINE HYDROCHLORIDE 5 MG: 10 TABLET, FILM COATED ORAL at 08:18

## 2020-07-27 RX ADMIN — CEFTRIAXONE 2 G: 2 INJECTION, POWDER, FOR SOLUTION INTRAMUSCULAR; INTRAVENOUS at 01:24

## 2020-07-27 RX ADMIN — SODIUM CHLORIDE, PRESERVATIVE FREE 10 ML: 5 INJECTION INTRAVENOUS at 08:19

## 2020-07-27 RX ADMIN — HEPARIN SODIUM 5000 UNITS: 5000 INJECTION, SOLUTION INTRAVENOUS; SUBCUTANEOUS at 08:18

## 2020-07-27 RX ADMIN — AMLODIPINE BESYLATE 5 MG: 5 TABLET ORAL at 08:18

## 2020-07-27 RX ADMIN — Medication 1 TABLET: at 08:18

## 2021-01-01 ENCOUNTER — APPOINTMENT (OUTPATIENT)
Dept: ULTRASOUND IMAGING | Facility: HOSPITAL | Age: 86
End: 2021-01-01

## 2021-01-01 ENCOUNTER — APPOINTMENT (OUTPATIENT)
Dept: CARDIOLOGY | Facility: HOSPITAL | Age: 86
End: 2021-01-01

## 2021-01-01 ENCOUNTER — APPOINTMENT (OUTPATIENT)
Dept: GENERAL RADIOLOGY | Facility: HOSPITAL | Age: 86
End: 2021-01-01

## 2021-01-01 ENCOUNTER — HOSPITAL ENCOUNTER (INPATIENT)
Facility: HOSPITAL | Age: 86
LOS: 4 days | End: 2021-10-05
Attending: EMERGENCY MEDICINE | Admitting: HOSPITALIST

## 2021-01-01 ENCOUNTER — APPOINTMENT (OUTPATIENT)
Dept: NUCLEAR MEDICINE | Facility: HOSPITAL | Age: 86
End: 2021-01-01

## 2021-01-01 VITALS
SYSTOLIC BLOOD PRESSURE: 50 MMHG | WEIGHT: 86.5 LBS | OXYGEN SATURATION: 67 % | TEMPERATURE: 97 F | HEIGHT: 60 IN | BODY MASS INDEX: 16.98 KG/M2 | DIASTOLIC BLOOD PRESSURE: 29 MMHG

## 2021-01-01 DIAGNOSIS — N39.0 URINARY TRACT INFECTION WITHOUT HEMATURIA, SITE UNSPECIFIED: ICD-10-CM

## 2021-01-01 DIAGNOSIS — E86.0 DEHYDRATION: ICD-10-CM

## 2021-01-01 DIAGNOSIS — R79.89 ELEVATED D-DIMER: ICD-10-CM

## 2021-01-01 DIAGNOSIS — A41.9 SEPTIC SHOCK (HCC): Primary | ICD-10-CM

## 2021-01-01 DIAGNOSIS — E87.0 HYPERNATREMIA: ICD-10-CM

## 2021-01-01 DIAGNOSIS — R77.8 ELEVATED TROPONIN: ICD-10-CM

## 2021-01-01 DIAGNOSIS — J18.9 COMMUNITY ACQUIRED PNEUMONIA, UNSPECIFIED LATERALITY: ICD-10-CM

## 2021-01-01 DIAGNOSIS — R65.21 SEPTIC SHOCK (HCC): Primary | ICD-10-CM

## 2021-01-01 DIAGNOSIS — N17.9 ACUTE KIDNEY INJURY (HCC): ICD-10-CM

## 2021-01-01 LAB
A-A DO2: 212.5 MMHG (ref 0–300)
A-A DO2: 540.4 MMHG (ref 0–300)
A-A DO2: 567.2 MMHG (ref 0–300)
A-A DO2: ABNORMAL
ALBUMIN SERPL-MCNC: 2.48 G/DL (ref 3.5–5.2)
ALBUMIN SERPL-MCNC: 2.89 G/DL (ref 3.5–5.2)
ALBUMIN/GLOB SERPL: 0.8 G/DL
ALBUMIN/GLOB SERPL: 0.8 G/DL
ALP SERPL-CCNC: 70 U/L (ref 39–117)
ALP SERPL-CCNC: 93 U/L (ref 39–117)
ALT SERPL W P-5'-P-CCNC: 17 U/L (ref 1–33)
ALT SERPL W P-5'-P-CCNC: 18 U/L (ref 1–33)
ANION GAP SERPL CALCULATED.3IONS-SCNC: 15.8 MMOL/L (ref 5–15)
ANION GAP SERPL CALCULATED.3IONS-SCNC: 16.5 MMOL/L (ref 5–15)
ANION GAP SERPL CALCULATED.3IONS-SCNC: 16.6 MMOL/L (ref 5–15)
ANION GAP SERPL CALCULATED.3IONS-SCNC: 16.6 MMOL/L (ref 5–15)
ANION GAP SERPL CALCULATED.3IONS-SCNC: 17.6 MMOL/L (ref 5–15)
ANION GAP SERPL CALCULATED.3IONS-SCNC: 18.1 MMOL/L (ref 5–15)
ANION GAP SERPL CALCULATED.3IONS-SCNC: 18.7 MMOL/L (ref 5–15)
ANION GAP SERPL CALCULATED.3IONS-SCNC: 19 MMOL/L (ref 5–15)
ANION GAP SERPL CALCULATED.3IONS-SCNC: 19.2 MMOL/L (ref 5–15)
ANION GAP SERPL CALCULATED.3IONS-SCNC: 19.3 MMOL/L (ref 5–15)
ANION GAP SERPL CALCULATED.3IONS-SCNC: 19.8 MMOL/L (ref 5–15)
ANION GAP SERPL CALCULATED.3IONS-SCNC: 19.9 MMOL/L (ref 5–15)
ANION GAP SERPL CALCULATED.3IONS-SCNC: 20 MMOL/L (ref 5–15)
ANION GAP SERPL CALCULATED.3IONS-SCNC: 20.2 MMOL/L (ref 5–15)
ANION GAP SERPL CALCULATED.3IONS-SCNC: ABNORMAL MMOL/L
ANISOCYTOSIS BLD QL: ABNORMAL
APTT PPP: 28.6 SECONDS (ref 25.5–35.4)
APTT PPP: 46.1 SECONDS (ref 25.5–35.4)
APTT PPP: >100 SECONDS (ref 25.5–35.4)
APTT PPP: >100 SECONDS (ref 25.5–35.4)
ARTERIAL PATENCY WRIST A: ABNORMAL
ARTERIAL PATENCY WRIST A: POSITIVE
AST SERPL-CCNC: 32 U/L (ref 1–32)
AST SERPL-CCNC: 36 U/L (ref 1–32)
ATMOSPHERIC PRESS: 732 MMHG
B PARAPERT DNA SPEC QL NAA+PROBE: NOT DETECTED
B PERT DNA SPEC QL NAA+PROBE: NOT DETECTED
BACTERIA SPEC AEROBE CULT: ABNORMAL
BACTERIA UR QL AUTO: ABNORMAL /HPF
BASE EXCESS BLDA CALC-SCNC: -11.3 MMOL/L (ref 0–2)
BASE EXCESS BLDA CALC-SCNC: -11.7 MMOL/L (ref 0–2)
BASE EXCESS BLDA CALC-SCNC: -15 MMOL/L (ref 0–2)
BASE EXCESS BLDA CALC-SCNC: -9.5 MMOL/L (ref 0–2)
BDY SITE: ABNORMAL
BH CV ECHO MEAS - ACS: 1.4 CM
BH CV ECHO MEAS - AO MAX PG: 3.5 MMHG
BH CV ECHO MEAS - AO MEAN PG: 3 MMHG
BH CV ECHO MEAS - AO ROOT AREA (BSA CORRECTED): 2.3
BH CV ECHO MEAS - AO ROOT AREA: 6.8 CM^2
BH CV ECHO MEAS - AO ROOT DIAM: 3 CM
BH CV ECHO MEAS - AO V2 MAX: 93.3 CM/SEC
BH CV ECHO MEAS - AO V2 MEAN: 82.3 CM/SEC
BH CV ECHO MEAS - AO V2 VTI: 16.7 CM
BH CV ECHO MEAS - BSA(HAYCOCK): 1.3 M^2
BH CV ECHO MEAS - BSA: 1.3 M^2
BH CV ECHO MEAS - BZI_BMI: 16.2 KILOGRAMS/M^2
BH CV ECHO MEAS - BZI_METRIC_HEIGHT: 152.4 CM
BH CV ECHO MEAS - BZI_METRIC_WEIGHT: 37.6 KG
BH CV ECHO MEAS - EDV(CUBED): 29.8 ML
BH CV ECHO MEAS - EDV(MOD-SP4): 15.2 ML
BH CV ECHO MEAS - EDV(TEICH): 37.9 ML
BH CV ECHO MEAS - EF(CUBED): 73.1 %
BH CV ECHO MEAS - EF(MOD-SP4): 70.1 %
BH CV ECHO MEAS - EF(TEICH): 66.4 %
BH CV ECHO MEAS - ESV(CUBED): 8 ML
BH CV ECHO MEAS - ESV(MOD-SP4): 4.5 ML
BH CV ECHO MEAS - ESV(TEICH): 12.7 ML
BH CV ECHO MEAS - FS: 35.5 %
BH CV ECHO MEAS - IVS/LVPW: 1
BH CV ECHO MEAS - IVSD: 1 CM
BH CV ECHO MEAS - LA DIMENSION: 1.7 CM
BH CV ECHO MEAS - LA/AO: 0.58
BH CV ECHO MEAS - LV DIASTOLIC VOL/BSA (35-75): 11.8 ML/M^2
BH CV ECHO MEAS - LV MASS(C)D: 86.2 GRAMS
BH CV ECHO MEAS - LV MASS(C)DI: 67.1 GRAMS/M^2
BH CV ECHO MEAS - LV SYSTOLIC VOL/BSA (12-30): 3.5 ML/M^2
BH CV ECHO MEAS - LVIDD: 3.1 CM
BH CV ECHO MEAS - LVIDS: 2 CM
BH CV ECHO MEAS - LVLD AP4: 5.9 CM
BH CV ECHO MEAS - LVLS AP4: 4.7 CM
BH CV ECHO MEAS - LVOT AREA (M): 2.3 CM^2
BH CV ECHO MEAS - LVOT AREA: 2.3 CM^2
BH CV ECHO MEAS - LVOT DIAM: 1.7 CM
BH CV ECHO MEAS - LVPWD: 1 CM
BH CV ECHO MEAS - MV A MAX VEL: 69.4 CM/SEC
BH CV ECHO MEAS - MV E MAX VEL: 57.4 CM/SEC
BH CV ECHO MEAS - MV E/A: 0.83
BH CV ECHO MEAS - PA ACC TIME: 0.06 SEC
BH CV ECHO MEAS - PA PR(ACCEL): 50.7 MMHG
BH CV ECHO MEAS - RAP SYSTOLE: 10 MMHG
BH CV ECHO MEAS - RVSP: 36 MMHG
BH CV ECHO MEAS - SI(AO): 88.9 ML/M^2
BH CV ECHO MEAS - SI(CUBED): 17 ML/M^2
BH CV ECHO MEAS - SI(MOD-SP4): 8.3 ML/M^2
BH CV ECHO MEAS - SI(TEICH): 19.6 ML/M^2
BH CV ECHO MEAS - SV(AO): 114.1 ML
BH CV ECHO MEAS - SV(CUBED): 21.8 ML
BH CV ECHO MEAS - SV(MOD-SP4): 10.7 ML
BH CV ECHO MEAS - SV(TEICH): 25.2 ML
BH CV ECHO MEAS - TR MAX VEL: 255 CM/SEC
BILIRUB SERPL-MCNC: 0.5 MG/DL (ref 0–1.2)
BILIRUB SERPL-MCNC: 0.6 MG/DL (ref 0–1.2)
BILIRUB UR QL STRIP: NEGATIVE
BODY TEMPERATURE: 0 C
BUN SERPL-MCNC: 75 MG/DL (ref 8–23)
BUN SERPL-MCNC: 76 MG/DL (ref 8–23)
BUN SERPL-MCNC: 77 MG/DL (ref 8–23)
BUN SERPL-MCNC: 78 MG/DL (ref 8–23)
BUN SERPL-MCNC: 82 MG/DL (ref 8–23)
BUN SERPL-MCNC: 83 MG/DL (ref 8–23)
BUN SERPL-MCNC: 85 MG/DL (ref 8–23)
BUN SERPL-MCNC: 86 MG/DL (ref 8–23)
BUN SERPL-MCNC: 86 MG/DL (ref 8–23)
BUN SERPL-MCNC: 87 MG/DL (ref 8–23)
BUN SERPL-MCNC: 88 MG/DL (ref 8–23)
BUN SERPL-MCNC: 90 MG/DL (ref 8–23)
BUN SERPL-MCNC: 92 MG/DL (ref 8–23)
BUN SERPL-MCNC: 93 MG/DL (ref 8–23)
BUN SERPL-MCNC: 98 MG/DL (ref 8–23)
BUN/CREAT SERPL: 29.6 (ref 7–25)
BUN/CREAT SERPL: 29.9 (ref 7–25)
BUN/CREAT SERPL: 30 (ref 7–25)
BUN/CREAT SERPL: 30.7 (ref 7–25)
BUN/CREAT SERPL: 30.7 (ref 7–25)
BUN/CREAT SERPL: 31.2 (ref 7–25)
BUN/CREAT SERPL: 31.2 (ref 7–25)
BUN/CREAT SERPL: 31.6 (ref 7–25)
BUN/CREAT SERPL: 31.8 (ref 7–25)
BUN/CREAT SERPL: 31.8 (ref 7–25)
BUN/CREAT SERPL: 32.5 (ref 7–25)
BUN/CREAT SERPL: 32.9 (ref 7–25)
BUN/CREAT SERPL: 33.3 (ref 7–25)
BUN/CREAT SERPL: 33.7 (ref 7–25)
BUN/CREAT SERPL: 34.1 (ref 7–25)
BURR CELLS BLD QL SMEAR: ABNORMAL
C PNEUM DNA NPH QL NAA+NON-PROBE: NOT DETECTED
CALCIUM SPEC-SCNC: 6.1 MG/DL (ref 8.2–9.6)
CALCIUM SPEC-SCNC: 6.2 MG/DL (ref 8.2–9.6)
CALCIUM SPEC-SCNC: 6.3 MG/DL (ref 8.2–9.6)
CALCIUM SPEC-SCNC: 6.4 MG/DL (ref 8.2–9.6)
CALCIUM SPEC-SCNC: 6.6 MG/DL (ref 8.2–9.6)
CALCIUM SPEC-SCNC: 6.7 MG/DL (ref 8.2–9.6)
CALCIUM SPEC-SCNC: 7 MG/DL (ref 8.2–9.6)
CALCIUM SPEC-SCNC: 7.3 MG/DL (ref 8.2–9.6)
CALCIUM SPEC-SCNC: 7.3 MG/DL (ref 8.2–9.6)
CALCIUM SPEC-SCNC: 7.5 MG/DL (ref 8.2–9.6)
CALCIUM SPEC-SCNC: 7.7 MG/DL (ref 8.2–9.6)
CALCIUM SPEC-SCNC: 7.9 MG/DL (ref 8.2–9.6)
CALCIUM SPEC-SCNC: 8.4 MG/DL (ref 8.2–9.6)
CALCIUM SPEC-SCNC: 8.4 MG/DL (ref 8.2–9.6)
CALCIUM SPEC-SCNC: 8.5 MG/DL (ref 8.2–9.6)
CHLORIDE SERPL-SCNC: 115 MMOL/L (ref 98–107)
CHLORIDE SERPL-SCNC: 118 MMOL/L (ref 98–107)
CHLORIDE SERPL-SCNC: 120 MMOL/L (ref 98–107)
CHLORIDE SERPL-SCNC: 123 MMOL/L (ref 98–107)
CHLORIDE SERPL-SCNC: 127 MMOL/L (ref 98–107)
CHLORIDE SERPL-SCNC: 132 MMOL/L (ref 98–107)
CHLORIDE SERPL-SCNC: 133 MMOL/L (ref 98–107)
CHLORIDE SERPL-SCNC: 135 MMOL/L (ref 98–107)
CHLORIDE SERPL-SCNC: 136 MMOL/L (ref 98–107)
CHLORIDE SERPL-SCNC: 137 MMOL/L (ref 98–107)
CHLORIDE SERPL-SCNC: 139 MMOL/L (ref 98–107)
CHLORIDE SERPL-SCNC: 139 MMOL/L (ref 98–107)
CHLORIDE SERPL-SCNC: >140 MMOL/L (ref 98–107)
CLARITY UR: ABNORMAL
CO2 BLDA-SCNC: 13 MMOL/L (ref 22–33)
CO2 BLDA-SCNC: 13.9 MMOL/L (ref 22–33)
CO2 BLDA-SCNC: 14.5 MMOL/L (ref 22–33)
CO2 BLDA-SCNC: 15 MMOL/L (ref 22–33)
CO2 SERPL-SCNC: 10 MMOL/L (ref 22–29)
CO2 SERPL-SCNC: 11.2 MMOL/L (ref 22–29)
CO2 SERPL-SCNC: 11.3 MMOL/L (ref 22–29)
CO2 SERPL-SCNC: 11.7 MMOL/L (ref 22–29)
CO2 SERPL-SCNC: 11.8 MMOL/L (ref 22–29)
CO2 SERPL-SCNC: 12 MMOL/L (ref 22–29)
CO2 SERPL-SCNC: 12.9 MMOL/L (ref 22–29)
CO2 SERPL-SCNC: 13.4 MMOL/L (ref 22–29)
CO2 SERPL-SCNC: 13.4 MMOL/L (ref 22–29)
CO2 SERPL-SCNC: 13.7 MMOL/L (ref 22–29)
CO2 SERPL-SCNC: 13.8 MMOL/L (ref 22–29)
CO2 SERPL-SCNC: 14.2 MMOL/L (ref 22–29)
CO2 SERPL-SCNC: 14.2 MMOL/L (ref 22–29)
CO2 SERPL-SCNC: 14.4 MMOL/L (ref 22–29)
CO2 SERPL-SCNC: 14.4 MMOL/L (ref 22–29)
CO2 SERPL-SCNC: 14.5 MMOL/L (ref 22–29)
CO2 SERPL-SCNC: 9.1 MMOL/L (ref 22–29)
COHGB MFR BLD: 0.6 % (ref 0–5)
COHGB MFR BLD: 0.7 % (ref 0–5)
COHGB MFR BLD: 0.8 % (ref 0–5)
COHGB MFR BLD: 0.9 % (ref 0–5)
COLOR UR: YELLOW
CREAT SERPL-MCNC: 2.34 MG/DL (ref 0.57–1)
CREAT SERPL-MCNC: 2.4 MG/DL (ref 0.57–1)
CREAT SERPL-MCNC: 2.42 MG/DL (ref 0.57–1)
CREAT SERPL-MCNC: 2.43 MG/DL (ref 0.57–1)
CREAT SERPL-MCNC: 2.5 MG/DL (ref 0.57–1)
CREAT SERPL-MCNC: 2.64 MG/DL (ref 0.57–1)
CREAT SERPL-MCNC: 2.64 MG/DL (ref 0.57–1)
CREAT SERPL-MCNC: 2.65 MG/DL (ref 0.57–1)
CREAT SERPL-MCNC: 2.78 MG/DL (ref 0.57–1)
CREAT SERPL-MCNC: 2.79 MG/DL (ref 0.57–1)
CREAT SERPL-MCNC: 2.79 MG/DL (ref 0.57–1)
CREAT SERPL-MCNC: 2.8 MG/DL (ref 0.57–1)
CREAT SERPL-MCNC: 2.83 MG/DL (ref 0.57–1)
CREAT SERPL-MCNC: 2.87 MG/DL (ref 0.57–1)
CREAT SERPL-MCNC: 2.89 MG/DL (ref 0.57–1)
CREAT SERPL-MCNC: 2.94 MG/DL (ref 0.57–1)
CREAT SERPL-MCNC: 2.98 MG/DL (ref 0.57–1)
CRP SERPL-MCNC: 2.04 MG/DL (ref 0–0.5)
D DIMER PPP FEU-MCNC: >20 MCGFEU/ML (ref 0–0.5)
D-LACTATE SERPL-SCNC: 4.8 MMOL/L (ref 0.5–2)
D-LACTATE SERPL-SCNC: 5.1 MMOL/L (ref 0.5–2)
D-LACTATE SERPL-SCNC: 5.7 MMOL/L (ref 0.5–2)
D-LACTATE SERPL-SCNC: 6.3 MMOL/L (ref 0.5–2)
D-LACTATE SERPL-SCNC: 7.1 MMOL/L (ref 0.5–2)
DACRYOCYTES BLD QL SMEAR: ABNORMAL
DEPRECATED RDW RBC AUTO: 70.5 FL (ref 37–54)
DEPRECATED RDW RBC AUTO: 71 FL (ref 37–54)
DEPRECATED RDW RBC AUTO: 72.3 FL (ref 37–54)
DEPRECATED RDW RBC AUTO: 72.4 FL (ref 37–54)
DEPRECATED RDW RBC AUTO: 73.1 FL (ref 37–54)
EOSINOPHIL # BLD MANUAL: 0.08 10*3/MM3 (ref 0–0.4)
EOSINOPHIL # BLD MANUAL: 0.13 10*3/MM3 (ref 0–0.4)
EOSINOPHIL NFR BLD MANUAL: 2 % (ref 0.3–6.2)
EOSINOPHIL NFR BLD MANUAL: 2 % (ref 0.3–6.2)
ERYTHROCYTE [DISTWIDTH] IN BLOOD BY AUTOMATED COUNT: 17.8 % (ref 12.3–15.4)
ERYTHROCYTE [DISTWIDTH] IN BLOOD BY AUTOMATED COUNT: 17.9 % (ref 12.3–15.4)
ERYTHROCYTE [DISTWIDTH] IN BLOOD BY AUTOMATED COUNT: 17.9 % (ref 12.3–15.4)
ERYTHROCYTE [DISTWIDTH] IN BLOOD BY AUTOMATED COUNT: 18.4 % (ref 12.3–15.4)
ERYTHROCYTE [DISTWIDTH] IN BLOOD BY AUTOMATED COUNT: 18.6 % (ref 12.3–15.4)
FLUAV RNA RESP QL NAA+PROBE: NOT DETECTED
FLUAV SUBTYP SPEC NAA+PROBE: NOT DETECTED
FLUBV RNA ISLT QL NAA+PROBE: NOT DETECTED
FLUBV RNA RESP QL NAA+PROBE: NOT DETECTED
GAS FLOW AIRWAY: 12 LPM
GAS FLOW AIRWAY: 55 LPM
GAS FLOW AIRWAY: 6 LPM
GFR SERPL CREATININE-BSD FRML MDRD: 15 ML/MIN/1.73
GFR SERPL CREATININE-BSD FRML MDRD: 16 ML/MIN/1.73
GFR SERPL CREATININE-BSD FRML MDRD: 17 ML/MIN/1.73
GFR SERPL CREATININE-BSD FRML MDRD: 18 ML/MIN/1.73
GFR SERPL CREATININE-BSD FRML MDRD: 19 ML/MIN/1.73
GIANT PLATELETS: ABNORMAL
GLOBULIN UR ELPH-MCNC: 3 GM/DL
GLOBULIN UR ELPH-MCNC: 3.6 GM/DL
GLUCOSE BLDC GLUCOMTR-MCNC: 110 MG/DL (ref 70–130)
GLUCOSE BLDC GLUCOMTR-MCNC: 130 MG/DL (ref 70–130)
GLUCOSE BLDC GLUCOMTR-MCNC: 158 MG/DL (ref 70–130)
GLUCOSE BLDC GLUCOMTR-MCNC: 161 MG/DL (ref 70–130)
GLUCOSE BLDC GLUCOMTR-MCNC: 163 MG/DL (ref 70–130)
GLUCOSE BLDC GLUCOMTR-MCNC: 172 MG/DL (ref 70–130)
GLUCOSE BLDC GLUCOMTR-MCNC: 190 MG/DL (ref 70–130)
GLUCOSE BLDC GLUCOMTR-MCNC: 202 MG/DL (ref 70–130)
GLUCOSE BLDC GLUCOMTR-MCNC: 202 MG/DL (ref 70–130)
GLUCOSE BLDC GLUCOMTR-MCNC: 205 MG/DL (ref 70–130)
GLUCOSE BLDC GLUCOMTR-MCNC: 214 MG/DL (ref 70–130)
GLUCOSE BLDC GLUCOMTR-MCNC: 227 MG/DL (ref 70–130)
GLUCOSE BLDC GLUCOMTR-MCNC: 235 MG/DL (ref 70–130)
GLUCOSE BLDC GLUCOMTR-MCNC: 251 MG/DL (ref 70–130)
GLUCOSE BLDC GLUCOMTR-MCNC: 252 MG/DL (ref 70–130)
GLUCOSE BLDC GLUCOMTR-MCNC: 67 MG/DL (ref 70–130)
GLUCOSE BLDC GLUCOMTR-MCNC: 85 MG/DL (ref 70–130)
GLUCOSE BLDC GLUCOMTR-MCNC: 90 MG/DL (ref 70–130)
GLUCOSE BLDC GLUCOMTR-MCNC: 91 MG/DL (ref 70–130)
GLUCOSE BLDC GLUCOMTR-MCNC: 99 MG/DL (ref 70–130)
GLUCOSE SERPL-MCNC: 103 MG/DL (ref 65–99)
GLUCOSE SERPL-MCNC: 110 MG/DL (ref 65–99)
GLUCOSE SERPL-MCNC: 114 MG/DL (ref 65–99)
GLUCOSE SERPL-MCNC: 160 MG/DL (ref 65–99)
GLUCOSE SERPL-MCNC: 170 MG/DL (ref 65–99)
GLUCOSE SERPL-MCNC: 179 MG/DL (ref 65–99)
GLUCOSE SERPL-MCNC: 180 MG/DL (ref 65–99)
GLUCOSE SERPL-MCNC: 208 MG/DL (ref 65–99)
GLUCOSE SERPL-MCNC: 208 MG/DL (ref 65–99)
GLUCOSE SERPL-MCNC: 209 MG/DL (ref 65–99)
GLUCOSE SERPL-MCNC: 232 MG/DL (ref 65–99)
GLUCOSE SERPL-MCNC: 235 MG/DL (ref 65–99)
GLUCOSE SERPL-MCNC: 250 MG/DL (ref 65–99)
GLUCOSE SERPL-MCNC: 269 MG/DL (ref 65–99)
GLUCOSE SERPL-MCNC: 277 MG/DL (ref 65–99)
GLUCOSE SERPL-MCNC: 299 MG/DL (ref 65–99)
GLUCOSE SERPL-MCNC: 79 MG/DL (ref 65–99)
GLUCOSE UR STRIP-MCNC: NEGATIVE MG/DL
HADV DNA SPEC NAA+PROBE: NOT DETECTED
HCO3 BLDA-SCNC: 12 MMOL/L (ref 20–26)
HCO3 BLDA-SCNC: 13 MMOL/L (ref 20–26)
HCO3 BLDA-SCNC: 13.6 MMOL/L (ref 20–26)
HCO3 BLDA-SCNC: 14.2 MMOL/L (ref 20–26)
HCOV 229E RNA SPEC QL NAA+PROBE: NOT DETECTED
HCOV HKU1 RNA SPEC QL NAA+PROBE: NOT DETECTED
HCOV NL63 RNA SPEC QL NAA+PROBE: NOT DETECTED
HCOV OC43 RNA SPEC QL NAA+PROBE: NOT DETECTED
HCT VFR BLD AUTO: 31.1 % (ref 34–46.6)
HCT VFR BLD AUTO: 38.1 % (ref 34–46.6)
HCT VFR BLD AUTO: 44.2 % (ref 34–46.6)
HCT VFR BLD AUTO: 46.3 % (ref 34–46.6)
HCT VFR BLD AUTO: 51.8 % (ref 34–46.6)
HCT VFR BLD CALC: 39.4 % (ref 38–51)
HCT VFR BLD CALC: 40.7 % (ref 38–51)
HCT VFR BLD CALC: 42.9 % (ref 38–51)
HCT VFR BLD CALC: 43.8 % (ref 38–51)
HGB BLD-MCNC: 10.8 G/DL (ref 12–15.9)
HGB BLD-MCNC: 12.7 G/DL (ref 12–15.9)
HGB BLD-MCNC: 13.4 G/DL (ref 12–15.9)
HGB BLD-MCNC: 14.9 G/DL (ref 12–15.9)
HGB BLD-MCNC: 9 G/DL (ref 12–15.9)
HGB BLDA-MCNC: 12.9 G/DL (ref 13.5–17.5)
HGB BLDA-MCNC: 13.3 G/DL (ref 13.5–17.5)
HGB BLDA-MCNC: 14 G/DL (ref 13.5–17.5)
HGB BLDA-MCNC: 14.3 G/DL (ref 13.5–17.5)
HGB UR QL STRIP.AUTO: ABNORMAL
HMPV RNA NPH QL NAA+NON-PROBE: NOT DETECTED
HPIV1 RNA SPEC QL NAA+PROBE: NOT DETECTED
HPIV2 RNA SPEC QL NAA+PROBE: NOT DETECTED
HPIV3 RNA NPH QL NAA+PROBE: NOT DETECTED
HPIV4 P GENE NPH QL NAA+PROBE: NOT DETECTED
HYALINE CASTS UR QL AUTO: ABNORMAL /LPF
HYPOCHROMIA BLD QL: ABNORMAL
INHALED O2 CONCENTRATION: 100 %
INHALED O2 CONCENTRATION: 44 %
INHALED O2 CONCENTRATION: 92 %
INHALED O2 CONCENTRATION: <21 %
INR PPP: 1.36 (ref 0.9–1.1)
KETONES UR QL STRIP: NEGATIVE
LEUKOCYTE ESTERASE UR QL STRIP.AUTO: ABNORMAL
LYMPHOCYTES # BLD MANUAL: 0.62 10*3/MM3 (ref 0.7–3.1)
LYMPHOCYTES # BLD MANUAL: 1.46 10*3/MM3 (ref 0.7–3.1)
LYMPHOCYTES # BLD MANUAL: 1.49 10*3/MM3 (ref 0.7–3.1)
LYMPHOCYTES # BLD MANUAL: 1.54 10*3/MM3 (ref 0.7–3.1)
LYMPHOCYTES # BLD MANUAL: 1.59 10*3/MM3 (ref 0.7–3.1)
LYMPHOCYTES NFR BLD MANUAL: 11 % (ref 19.6–45.3)
LYMPHOCYTES NFR BLD MANUAL: 11 % (ref 19.6–45.3)
LYMPHOCYTES NFR BLD MANUAL: 25 % (ref 19.6–45.3)
LYMPHOCYTES NFR BLD MANUAL: 37 % (ref 19.6–45.3)
LYMPHOCYTES NFR BLD MANUAL: 37 % (ref 19.6–45.3)
LYMPHOCYTES NFR BLD MANUAL: 4 % (ref 5–12)
LYMPHOCYTES NFR BLD MANUAL: 5 % (ref 5–12)
LYMPHOCYTES NFR BLD MANUAL: 6 % (ref 5–12)
LYMPHOCYTES NFR BLD MANUAL: 7 % (ref 5–12)
LYMPHOCYTES NFR BLD MANUAL: 7 % (ref 5–12)
Lab: ABNORMAL
M PNEUMO IGG SER IA-ACNC: NOT DETECTED
MACROCYTES BLD QL SMEAR: ABNORMAL
MAGNESIUM SERPL-MCNC: 2.6 MG/DL (ref 1.7–2.3)
MAXIMAL PREDICTED HEART RATE: 128 BPM
MCH RBC QN AUTO: 30.5 PG (ref 26.6–33)
MCH RBC QN AUTO: 30.8 PG (ref 26.6–33)
MCH RBC QN AUTO: 31 PG (ref 26.6–33)
MCH RBC QN AUTO: 31.2 PG (ref 26.6–33)
MCH RBC QN AUTO: 31.3 PG (ref 26.6–33)
MCHC RBC AUTO-ENTMCNC: 28.3 G/DL (ref 31.5–35.7)
MCHC RBC AUTO-ENTMCNC: 28.7 G/DL (ref 31.5–35.7)
MCHC RBC AUTO-ENTMCNC: 28.8 G/DL (ref 31.5–35.7)
MCHC RBC AUTO-ENTMCNC: 28.9 G/DL (ref 31.5–35.7)
MCHC RBC AUTO-ENTMCNC: 28.9 G/DL (ref 31.5–35.7)
MCV RBC AUTO: 105.4 FL (ref 79–97)
MCV RBC AUTO: 107.7 FL (ref 79–97)
MCV RBC AUTO: 107.7 FL (ref 79–97)
MCV RBC AUTO: 108.5 FL (ref 79–97)
MCV RBC AUTO: 108.9 FL (ref 79–97)
METAMYELOCYTES NFR BLD MANUAL: 1 % (ref 0–0)
METAMYELOCYTES NFR BLD MANUAL: 1 % (ref 0–0)
METAMYELOCYTES NFR BLD MANUAL: 2 % (ref 0–0)
METAMYELOCYTES NFR BLD MANUAL: 3 % (ref 0–0)
METAMYELOCYTES NFR BLD MANUAL: 4 % (ref 0–0)
METHGB BLD QL: 0 % (ref 0–3)
METHGB BLD QL: 0.1 % (ref 0–3)
METHGB BLD QL: 0.2 % (ref 0–3)
METHGB BLD QL: 0.3 % (ref 0–3)
MODALITY: ABNORMAL
MONOCYTES # BLD AUTO: 0.1 10*3/MM3 (ref 0.1–0.9)
MONOCYTES # BLD AUTO: 0.28 10*3/MM3 (ref 0.1–0.9)
MONOCYTES # BLD AUTO: 0.45 10*3/MM3 (ref 0.1–0.9)
MONOCYTES # BLD AUTO: 0.53 10*3/MM3 (ref 0.1–0.9)
MONOCYTES # BLD AUTO: 0.7 10*3/MM3 (ref 0.1–0.9)
MRSA DNA SPEC QL NAA+PROBE: POSITIVE
MYELOCYTES NFR BLD MANUAL: 2 % (ref 0–0)
MYELOCYTES NFR BLD MANUAL: 2 % (ref 0–0)
NEUTROPHILS # BLD AUTO: 0.89 10*3/MM3 (ref 1.7–7)
NEUTROPHILS # BLD AUTO: 10.91 10*3/MM3 (ref 1.7–7)
NEUTROPHILS # BLD AUTO: 11.61 10*3/MM3 (ref 1.7–7)
NEUTROPHILS # BLD AUTO: 2.1 10*3/MM3 (ref 1.7–7)
NEUTROPHILS # BLD AUTO: 3.88 10*3/MM3 (ref 1.7–7)
NEUTROPHILS NFR BLD MANUAL: 41 % (ref 42.7–76)
NEUTROPHILS NFR BLD MANUAL: 47 % (ref 42.7–76)
NEUTROPHILS NFR BLD MANUAL: 47 % (ref 42.7–76)
NEUTROPHILS NFR BLD MANUAL: 66 % (ref 42.7–76)
NEUTROPHILS NFR BLD MANUAL: 73 % (ref 42.7–76)
NEUTS BAND NFR BLD MANUAL: 10 % (ref 0–5)
NEUTS BAND NFR BLD MANUAL: 11 % (ref 0–5)
NEUTS BAND NFR BLD MANUAL: 14 % (ref 0–5)
NEUTS BAND NFR BLD MANUAL: 16 % (ref 0–5)
NEUTS BAND NFR BLD MANUAL: 6 % (ref 0–5)
NITRITE UR QL STRIP: NEGATIVE
NOTE: ABNORMAL
NOTIFIED BY: ABNORMAL
NOTIFIED BY: ABNORMAL
NOTIFIED WHO: ABNORMAL
NRBC SPEC MANUAL: 2 /100 WBC (ref 0–0.2)
NRBC SPEC MANUAL: 3 /100 WBC (ref 0–0.2)
NRBC SPEC MANUAL: 3 /100 WBC (ref 0–0.2)
NRBC SPEC MANUAL: 4 /100 WBC (ref 0–0.2)
NT-PROBNP SERPL-MCNC: 4279 PG/ML (ref 0–1800)
OXYHGB MFR BLDV: 85.8 % (ref 94–99)
OXYHGB MFR BLDV: 88.7 % (ref 94–99)
OXYHGB MFR BLDV: 92 % (ref 94–99)
OXYHGB MFR BLDV: 96.5 % (ref 94–99)
PCO2 BLDA: 25.3 MM HG (ref 35–45)
PCO2 BLDA: 26.5 MM HG (ref 35–45)
PCO2 BLDA: 27.7 MM HG (ref 35–45)
PCO2 BLDA: 31.8 MM HG (ref 35–45)
PCO2 TEMP ADJ BLD: ABNORMAL MM[HG]
PH BLDA: 7.19 PH UNITS (ref 7.35–7.45)
PH BLDA: 7.3 PH UNITS (ref 7.35–7.45)
PH BLDA: 7.3 PH UNITS (ref 7.35–7.45)
PH BLDA: 7.36 PH UNITS (ref 7.35–7.45)
PH UR STRIP.AUTO: 6 [PH] (ref 5–8)
PH, TEMP CORRECTED: ABNORMAL
PLAT MORPH BLD: NORMAL
PLATELET # BLD AUTO: 123 10*3/MM3 (ref 140–450)
PLATELET # BLD AUTO: 147 10*3/MM3 (ref 140–450)
PLATELET # BLD AUTO: 154 10*3/MM3 (ref 140–450)
PLATELET # BLD AUTO: 156 10*3/MM3 (ref 140–450)
PLATELET # BLD AUTO: 80 10*3/MM3 (ref 140–450)
PMV BLD AUTO: 12.3 FL (ref 6–12)
PMV BLD AUTO: 12.8 FL (ref 6–12)
PMV BLD AUTO: 12.8 FL (ref 6–12)
PMV BLD AUTO: 12.9 FL (ref 6–12)
PMV BLD AUTO: 12.9 FL (ref 6–12)
PO2 BLDA: 55.8 MM HG (ref 83–108)
PO2 BLDA: 59.9 MM HG (ref 83–108)
PO2 BLDA: 61.8 MM HG (ref 83–108)
PO2 BLDA: 92.5 MM HG (ref 83–108)
PO2 TEMP ADJ BLD: ABNORMAL MM[HG]
POIKILOCYTOSIS BLD QL SMEAR: ABNORMAL
POLYCHROMASIA BLD QL SMEAR: ABNORMAL
POLYCHROMASIA BLD QL SMEAR: ABNORMAL
POTASSIUM SERPL-SCNC: 3.3 MMOL/L (ref 3.5–5.2)
POTASSIUM SERPL-SCNC: 3.4 MMOL/L (ref 3.5–5.2)
POTASSIUM SERPL-SCNC: 3.4 MMOL/L (ref 3.5–5.2)
POTASSIUM SERPL-SCNC: 3.5 MMOL/L (ref 3.5–5.2)
POTASSIUM SERPL-SCNC: 3.6 MMOL/L (ref 3.5–5.2)
POTASSIUM SERPL-SCNC: 3.7 MMOL/L (ref 3.5–5.2)
POTASSIUM SERPL-SCNC: 3.8 MMOL/L (ref 3.5–5.2)
POTASSIUM SERPL-SCNC: 3.9 MMOL/L (ref 3.5–5.2)
POTASSIUM SERPL-SCNC: 4.2 MMOL/L (ref 3.5–5.2)
POTASSIUM SERPL-SCNC: 4.3 MMOL/L (ref 3.5–5.2)
POTASSIUM SERPL-SCNC: 4.3 MMOL/L (ref 3.5–5.2)
PROT SERPL-MCNC: 5.5 G/DL (ref 6–8.5)
PROT SERPL-MCNC: 6.5 G/DL (ref 6–8.5)
PROT UR QL STRIP: ABNORMAL
PROTHROMBIN TIME: 17.2 SECONDS (ref 12.8–14.5)
QT INTERVAL: 272 MS
QT INTERVAL: 274 MS
QT INTERVAL: 274 MS
QTC INTERVAL: 307 MS
QTC INTERVAL: 416 MS
QTC INTERVAL: 455 MS
RBC # BLD AUTO: 2.95 10*6/MM3 (ref 3.77–5.28)
RBC # BLD AUTO: 3.51 10*6/MM3 (ref 3.77–5.28)
RBC # BLD AUTO: 4.06 10*6/MM3 (ref 3.77–5.28)
RBC # BLD AUTO: 4.3 10*6/MM3 (ref 3.77–5.28)
RBC # BLD AUTO: 4.81 10*6/MM3 (ref 3.77–5.28)
RBC # UR: ABNORMAL /HPF
REF LAB TEST METHOD: ABNORMAL
RHINOVIRUS RNA SPEC NAA+PROBE: NOT DETECTED
RSV RNA NPH QL NAA+NON-PROBE: NOT DETECTED
S AUREUS DNA SPEC QL NAA+PROBE: POSITIVE
SAO2 % BLDCOA: 86.8 % (ref 94–99)
SAO2 % BLDCOA: 89.5 % (ref 94–99)
SAO2 % BLDCOA: 92.8 % (ref 94–99)
SAO2 % BLDCOA: 97.1 % (ref 94–99)
SARS-COV-2 RNA RESP QL NAA+PROBE: NOT DETECTED
SCAN SLIDE: NORMAL
SODIUM SERPL-SCNC: 144 MMOL/L (ref 136–145)
SODIUM SERPL-SCNC: 148 MMOL/L (ref 136–145)
SODIUM SERPL-SCNC: 151 MMOL/L (ref 136–145)
SODIUM SERPL-SCNC: 154 MMOL/L (ref 136–145)
SODIUM SERPL-SCNC: 158 MMOL/L (ref 136–145)
SODIUM SERPL-SCNC: 163 MMOL/L (ref 136–145)
SODIUM SERPL-SCNC: 164 MMOL/L (ref 136–145)
SODIUM SERPL-SCNC: 165 MMOL/L (ref 136–145)
SODIUM SERPL-SCNC: 168 MMOL/L (ref 136–145)
SODIUM SERPL-SCNC: 169 MMOL/L (ref 136–145)
SODIUM SERPL-SCNC: 170 MMOL/L (ref 136–145)
SODIUM SERPL-SCNC: 170 MMOL/L (ref 136–145)
SODIUM SERPL-SCNC: 175 MMOL/L (ref 136–145)
SODIUM SERPL-SCNC: 179 MMOL/L (ref 136–145)
SODIUM SERPL-SCNC: 179 MMOL/L (ref 136–145)
SP GR UR STRIP: 1.02 (ref 1–1.03)
SQUAMOUS #/AREA URNS HPF: ABNORMAL /HPF
STRESS TARGET HR: 109 BPM
TROPONIN T SERPL-MCNC: 0.22 NG/ML (ref 0–0.03)
TROPONIN T SERPL-MCNC: 0.22 NG/ML (ref 0–0.03)
TROPONIN T SERPL-MCNC: 0.23 NG/ML (ref 0–0.03)
UROBILINOGEN UR QL STRIP: ABNORMAL
VANCOMYCIN SERPL-MCNC: 14 MCG/ML (ref 5–40)
VANCOMYCIN SERPL-MCNC: 9.4 MCG/ML (ref 5–40)
VENTILATOR MODE: ABNORMAL
WBC # BLD AUTO: 1.67 10*3/MM3 (ref 3.4–10.8)
WBC # BLD AUTO: 13.3 10*3/MM3 (ref 3.4–10.8)
WBC # BLD AUTO: 13.99 10*3/MM3 (ref 3.4–10.8)
WBC # BLD AUTO: 4.04 10*3/MM3 (ref 3.4–10.8)
WBC # BLD AUTO: 6.36 10*3/MM3 (ref 3.4–10.8)
WBC UR QL AUTO: ABNORMAL /HPF

## 2021-01-01 PROCEDURE — P9612 CATHETERIZE FOR URINE SPEC: HCPCS

## 2021-01-01 PROCEDURE — 83880 ASSAY OF NATRIURETIC PEPTIDE: CPT | Performed by: EMERGENCY MEDICINE

## 2021-01-01 PROCEDURE — 82375 ASSAY CARBOXYHB QUANT: CPT

## 2021-01-01 PROCEDURE — 80048 BASIC METABOLIC PNL TOTAL CA: CPT | Performed by: HOSPITALIST

## 2021-01-01 PROCEDURE — A9540 TC99M MAA: HCPCS | Performed by: EMERGENCY MEDICINE

## 2021-01-01 PROCEDURE — 99233 SBSQ HOSP IP/OBS HIGH 50: CPT | Performed by: INTERNAL MEDICINE

## 2021-01-01 PROCEDURE — 25010000002 AMIODARONE IN DEXTROSE 5% 360-4.14 MG/200ML-% SOLUTION: Performed by: INTERNAL MEDICINE

## 2021-01-01 PROCEDURE — 71045 X-RAY EXAM CHEST 1 VIEW: CPT

## 2021-01-01 PROCEDURE — 83050 HGB METHEMOGLOBIN QUAN: CPT

## 2021-01-01 PROCEDURE — 78580 LUNG PERFUSION IMAGING: CPT

## 2021-01-01 PROCEDURE — 94799 UNLISTED PULMONARY SVC/PX: CPT

## 2021-01-01 PROCEDURE — 82962 GLUCOSE BLOOD TEST: CPT

## 2021-01-01 PROCEDURE — 25010000002 CEFEPIME PER 500 MG: Performed by: HOSPITALIST

## 2021-01-01 PROCEDURE — 85007 BL SMEAR W/DIFF WBC COUNT: CPT | Performed by: EMERGENCY MEDICINE

## 2021-01-01 PROCEDURE — 80053 COMPREHEN METABOLIC PANEL: CPT | Performed by: EMERGENCY MEDICINE

## 2021-01-01 PROCEDURE — 99233 SBSQ HOSP IP/OBS HIGH 50: CPT | Performed by: FAMILY MEDICINE

## 2021-01-01 PROCEDURE — 87086 URINE CULTURE/COLONY COUNT: CPT | Performed by: EMERGENCY MEDICINE

## 2021-01-01 PROCEDURE — 25010000002 HYALURONIDASE (HUMAN) 150 UNIT/ML SOLUTION 1 ML VIAL: Performed by: FAMILY MEDICINE

## 2021-01-01 PROCEDURE — 80202 ASSAY OF VANCOMYCIN: CPT | Performed by: FAMILY MEDICINE

## 2021-01-01 PROCEDURE — 93010 ELECTROCARDIOGRAM REPORT: CPT | Performed by: INTERNAL MEDICINE

## 2021-01-01 PROCEDURE — 99232 SBSQ HOSP IP/OBS MODERATE 35: CPT | Performed by: INTERNAL MEDICINE

## 2021-01-01 PROCEDURE — 93306 TTE W/DOPPLER COMPLETE: CPT | Performed by: INTERNAL MEDICINE

## 2021-01-01 PROCEDURE — 02HV33Z INSERTION OF INFUSION DEVICE INTO SUPERIOR VENA CAVA, PERCUTANEOUS APPROACH: ICD-10-PCS | Performed by: EMERGENCY MEDICINE

## 2021-01-01 PROCEDURE — 85610 PROTHROMBIN TIME: CPT | Performed by: HOSPITALIST

## 2021-01-01 PROCEDURE — 71045 X-RAY EXAM CHEST 1 VIEW: CPT | Performed by: RADIOLOGY

## 2021-01-01 PROCEDURE — 87636 SARSCOV2 & INF A&B AMP PRB: CPT | Performed by: EMERGENCY MEDICINE

## 2021-01-01 PROCEDURE — 25010000002 HEPARIN (PORCINE) PER 1000 UNITS: Performed by: FAMILY MEDICINE

## 2021-01-01 PROCEDURE — 93005 ELECTROCARDIOGRAM TRACING: CPT | Performed by: EMERGENCY MEDICINE

## 2021-01-01 PROCEDURE — 25010000002 AMIODARONE PER 30 MG: Performed by: INTERNAL MEDICINE

## 2021-01-01 PROCEDURE — 63710000001 INSULIN ASPART PER 5 UNITS: Performed by: HOSPITALIST

## 2021-01-01 PROCEDURE — 85025 COMPLETE CBC W/AUTO DIFF WBC: CPT | Performed by: HOSPITALIST

## 2021-01-01 PROCEDURE — 25010000002 MORPHINE PER 10 MG: Performed by: FAMILY MEDICINE

## 2021-01-01 PROCEDURE — 85025 COMPLETE CBC W/AUTO DIFF WBC: CPT | Performed by: FAMILY MEDICINE

## 2021-01-01 PROCEDURE — 36600 WITHDRAWAL OF ARTERIAL BLOOD: CPT

## 2021-01-01 PROCEDURE — 84484 ASSAY OF TROPONIN QUANT: CPT | Performed by: HOSPITALIST

## 2021-01-01 PROCEDURE — 25010000002 PHENYLEPHRINE 10 MG/ML SOLUTION: Performed by: HOSPITALIST

## 2021-01-01 PROCEDURE — 32556 INSERT CATH PLEURA W/O IMAGE: CPT | Performed by: SURGERY

## 2021-01-01 PROCEDURE — 86140 C-REACTIVE PROTEIN: CPT | Performed by: HOSPITALIST

## 2021-01-01 PROCEDURE — 83605 ASSAY OF LACTIC ACID: CPT | Performed by: FAMILY MEDICINE

## 2021-01-01 PROCEDURE — 85730 THROMBOPLASTIN TIME PARTIAL: CPT | Performed by: FAMILY MEDICINE

## 2021-01-01 PROCEDURE — 73620 X-RAY EXAM OF FOOT: CPT

## 2021-01-01 PROCEDURE — 94760 N-INVAS EAR/PLS OXIMETRY 1: CPT

## 2021-01-01 PROCEDURE — 93005 ELECTROCARDIOGRAM TRACING: CPT | Performed by: HOSPITALIST

## 2021-01-01 PROCEDURE — 85007 BL SMEAR W/DIFF WBC COUNT: CPT | Performed by: FAMILY MEDICINE

## 2021-01-01 PROCEDURE — 25010000002 VANCOMYCIN: Performed by: EMERGENCY MEDICINE

## 2021-01-01 PROCEDURE — 76775 US EXAM ABDO BACK WALL LIM: CPT | Performed by: RADIOLOGY

## 2021-01-01 PROCEDURE — 82805 BLOOD GASES W/O2 SATURATION: CPT

## 2021-01-01 PROCEDURE — 25010000002 PIPERACILLIN SOD-TAZOBACTAM PER 1 G: Performed by: EMERGENCY MEDICINE

## 2021-01-01 PROCEDURE — 0 TECHNETIUM ALBUMIN AGGREGATED: Performed by: EMERGENCY MEDICINE

## 2021-01-01 PROCEDURE — 83605 ASSAY OF LACTIC ACID: CPT | Performed by: EMERGENCY MEDICINE

## 2021-01-01 PROCEDURE — 83605 ASSAY OF LACTIC ACID: CPT | Performed by: HOSPITALIST

## 2021-01-01 PROCEDURE — 87633 RESP VIRUS 12-25 TARGETS: CPT | Performed by: HOSPITALIST

## 2021-01-01 PROCEDURE — 87040 BLOOD CULTURE FOR BACTERIA: CPT | Performed by: EMERGENCY MEDICINE

## 2021-01-01 PROCEDURE — 76775 US EXAM ABDO BACK WALL LIM: CPT

## 2021-01-01 PROCEDURE — 85730 THROMBOPLASTIN TIME PARTIAL: CPT | Performed by: HOSPITALIST

## 2021-01-01 PROCEDURE — 81001 URINALYSIS AUTO W/SCOPE: CPT | Performed by: EMERGENCY MEDICINE

## 2021-01-01 PROCEDURE — 25010000002 HEPARIN (PORCINE) PER 1000 UNITS: Performed by: HOSPITALIST

## 2021-01-01 PROCEDURE — 85007 BL SMEAR W/DIFF WBC COUNT: CPT | Performed by: HOSPITALIST

## 2021-01-01 PROCEDURE — 78580 LUNG PERFUSION IMAGING: CPT | Performed by: RADIOLOGY

## 2021-01-01 PROCEDURE — 93970 EXTREMITY STUDY: CPT | Performed by: RADIOLOGY

## 2021-01-01 PROCEDURE — 25010000002 VANCOMYCIN 5 G RECONSTITUTED SOLUTION: Performed by: HOSPITALIST

## 2021-01-01 PROCEDURE — 93306 TTE W/DOPPLER COMPLETE: CPT

## 2021-01-01 PROCEDURE — 83735 ASSAY OF MAGNESIUM: CPT | Performed by: HOSPITALIST

## 2021-01-01 PROCEDURE — 85379 FIBRIN DEGRADATION QUANT: CPT | Performed by: EMERGENCY MEDICINE

## 2021-01-01 PROCEDURE — 99284 EMERGENCY DEPT VISIT MOD MDM: CPT

## 2021-01-01 PROCEDURE — 84484 ASSAY OF TROPONIN QUANT: CPT | Performed by: EMERGENCY MEDICINE

## 2021-01-01 PROCEDURE — 0W9930Z DRAINAGE OF RIGHT PLEURAL CAVITY WITH DRAINAGE DEVICE, PERCUTANEOUS APPROACH: ICD-10-PCS | Performed by: SURGERY

## 2021-01-01 PROCEDURE — 87641 MR-STAPH DNA AMP PROBE: CPT | Performed by: HOSPITALIST

## 2021-01-01 PROCEDURE — 99285 EMERGENCY DEPT VISIT HI MDM: CPT

## 2021-01-01 PROCEDURE — 87640 STAPH A DNA AMP PROBE: CPT | Performed by: HOSPITALIST

## 2021-01-01 PROCEDURE — 93970 EXTREMITY STUDY: CPT

## 2021-01-01 PROCEDURE — 73620 X-RAY EXAM OF FOOT: CPT | Performed by: RADIOLOGY

## 2021-01-01 PROCEDURE — 80202 ASSAY OF VANCOMYCIN: CPT | Performed by: HOSPITALIST

## 2021-01-01 PROCEDURE — 85025 COMPLETE CBC W/AUTO DIFF WBC: CPT | Performed by: EMERGENCY MEDICINE

## 2021-01-01 PROCEDURE — 25010000003 POTASSIUM CHLORIDE 10 MEQ/100ML SOLUTION: Performed by: FAMILY MEDICINE

## 2021-01-01 PROCEDURE — 99291 CRITICAL CARE FIRST HOUR: CPT | Performed by: HOSPITALIST

## 2021-01-01 PROCEDURE — 93005 ELECTROCARDIOGRAM TRACING: CPT | Performed by: FAMILY MEDICINE

## 2021-01-01 PROCEDURE — 99232 SBSQ HOSP IP/OBS MODERATE 35: CPT | Performed by: NURSE PRACTITIONER

## 2021-01-01 PROCEDURE — 25010000002 CALCIUM GLUCONATE PER 10 ML: Performed by: FAMILY MEDICINE

## 2021-01-01 PROCEDURE — 25010000002 VANCOMYCIN 5 G RECONSTITUTED SOLUTION: Performed by: FAMILY MEDICINE

## 2021-01-01 PROCEDURE — 99222 1ST HOSP IP/OBS MODERATE 55: CPT | Performed by: INTERNAL MEDICINE

## 2021-01-01 PROCEDURE — 80053 COMPREHEN METABOLIC PANEL: CPT | Performed by: HOSPITALIST

## 2021-01-01 RX ORDER — BUSPIRONE HYDROCHLORIDE 5 MG/1
5 TABLET ORAL EVERY 12 HOURS SCHEDULED
Status: CANCELLED | OUTPATIENT
Start: 2021-01-01

## 2021-01-01 RX ORDER — MORPHINE SULFATE 2 MG/ML
2 INJECTION, SOLUTION INTRAMUSCULAR; INTRAVENOUS
Status: CANCELLED | OUTPATIENT
Start: 2021-01-01 | End: 2021-10-10

## 2021-01-01 RX ORDER — POTASSIUM CHLORIDE 7.45 MG/ML
10 INJECTION INTRAVENOUS
Status: COMPLETED | OUTPATIENT
Start: 2021-01-01 | End: 2021-01-01

## 2021-01-01 RX ORDER — FAMOTIDINE 10 MG/ML
20 INJECTION, SOLUTION INTRAVENOUS DAILY
Status: DISCONTINUED | OUTPATIENT
Start: 2021-01-01 | End: 2021-01-01

## 2021-01-01 RX ORDER — AMLODIPINE BESYLATE 5 MG/1
5 TABLET ORAL DAILY
Status: CANCELLED | OUTPATIENT
Start: 2021-01-01

## 2021-01-01 RX ORDER — ASPIRIN 300 MG/1
300 SUPPOSITORY RECTAL EVERY 6 HOURS PRN
Status: DISCONTINUED | OUTPATIENT
Start: 2021-01-01 | End: 2021-01-01

## 2021-01-01 RX ORDER — SODIUM CHLORIDE 0.9 % (FLUSH) 0.9 %
10 SYRINGE (ML) INJECTION EVERY 12 HOURS SCHEDULED
Status: DISCONTINUED | OUTPATIENT
Start: 2021-01-01 | End: 2021-01-01 | Stop reason: HOSPADM

## 2021-01-01 RX ORDER — CHOLECALCIFEROL (VITAMIN D3) 125 MCG
5 CAPSULE ORAL NIGHTLY
Status: CANCELLED | OUTPATIENT
Start: 2021-01-01

## 2021-01-01 RX ORDER — MORPHINE SULFATE 2 MG/ML
1 INJECTION, SOLUTION INTRAMUSCULAR; INTRAVENOUS EVERY 4 HOURS PRN
Status: DISCONTINUED | OUTPATIENT
Start: 2021-01-01 | End: 2021-01-01

## 2021-01-01 RX ORDER — SENNA PLUS 8.6 MG/1
1 TABLET ORAL DAILY PRN
Status: CANCELLED | OUTPATIENT
Start: 2021-01-01

## 2021-01-01 RX ORDER — SODIUM CHLORIDE 0.9 % (FLUSH) 0.9 %
10 SYRINGE (ML) INJECTION AS NEEDED
Status: DISCONTINUED | OUTPATIENT
Start: 2021-01-01 | End: 2021-01-01

## 2021-01-01 RX ORDER — LORAZEPAM 2 MG/ML
0.5 INJECTION INTRAMUSCULAR EVERY 4 HOURS PRN
Status: DISCONTINUED | OUTPATIENT
Start: 2021-01-01 | End: 2021-01-01 | Stop reason: HOSPADM

## 2021-01-01 RX ORDER — MEGESTROL ACETATE 40 MG/ML
200 SUSPENSION ORAL 2 TIMES DAILY
Status: CANCELLED | OUTPATIENT
Start: 2021-01-01

## 2021-01-01 RX ORDER — ROSUVASTATIN CALCIUM 10 MG/1
10 TABLET, COATED ORAL NIGHTLY
Status: CANCELLED | OUTPATIENT
Start: 2021-01-01

## 2021-01-01 RX ORDER — DEXTROSE MONOHYDRATE 25 G/50ML
25 INJECTION, SOLUTION INTRAVENOUS
Status: DISCONTINUED | OUTPATIENT
Start: 2021-01-01 | End: 2021-01-01

## 2021-01-01 RX ORDER — MORPHINE SULFATE 2 MG/ML
2 INJECTION, SOLUTION INTRAMUSCULAR; INTRAVENOUS
Status: DISCONTINUED | OUTPATIENT
Start: 2021-01-01 | End: 2021-01-01

## 2021-01-01 RX ORDER — DONEPEZIL HYDROCHLORIDE 5 MG/1
5 TABLET, FILM COATED ORAL NIGHTLY
Status: CANCELLED | OUTPATIENT
Start: 2021-01-01

## 2021-01-01 RX ORDER — DEXTROSE MONOHYDRATE 50 MG/ML
75 INJECTION, SOLUTION INTRAVENOUS CONTINUOUS
Status: DISCONTINUED | OUTPATIENT
Start: 2021-01-01 | End: 2021-01-01

## 2021-01-01 RX ORDER — DEXTROSE MONOHYDRATE 25 G/50ML
INJECTION, SOLUTION INTRAVENOUS
Status: COMPLETED
Start: 2021-01-01 | End: 2021-01-01

## 2021-01-01 RX ORDER — SODIUM CHLORIDE 0.9 % (FLUSH) 0.9 %
10 SYRINGE (ML) INJECTION AS NEEDED
Status: DISCONTINUED | OUTPATIENT
Start: 2021-01-01 | End: 2021-01-01 | Stop reason: HOSPADM

## 2021-01-01 RX ORDER — ASCORBIC ACID 500 MG
500 TABLET ORAL DAILY
COMMUNITY

## 2021-01-01 RX ORDER — FAMOTIDINE 20 MG/1
20 TABLET, FILM COATED ORAL DAILY
Status: CANCELLED | OUTPATIENT
Start: 2021-01-01

## 2021-01-01 RX ORDER — PHENYLEPHRINE HCL IN 0.9% NACL 0.5 MG/5ML
.5-3 SYRINGE (ML) INTRAVENOUS
Status: DISCONTINUED | OUTPATIENT
Start: 2021-01-01 | End: 2021-01-01

## 2021-01-01 RX ORDER — HEPARIN SODIUM 5000 [USP'U]/ML
60 INJECTION, SOLUTION INTRAVENOUS; SUBCUTANEOUS ONCE
Status: COMPLETED | OUTPATIENT
Start: 2021-01-01 | End: 2021-01-01

## 2021-01-01 RX ORDER — SODIUM CHLORIDE 9 MG/ML
125 INJECTION, SOLUTION INTRAVENOUS CONTINUOUS
Status: DISCONTINUED | OUTPATIENT
Start: 2021-01-01 | End: 2021-01-01

## 2021-01-01 RX ORDER — HEPARIN SODIUM 5000 [USP'U]/ML
5000 INJECTION, SOLUTION INTRAVENOUS; SUBCUTANEOUS EVERY 12 HOURS SCHEDULED
Status: DISCONTINUED | OUTPATIENT
Start: 2021-01-01 | End: 2021-01-01

## 2021-01-01 RX ORDER — SODIUM CHLORIDE 0.9 % (FLUSH) 0.9 %
20 SYRINGE (ML) INJECTION AS NEEDED
Status: DISCONTINUED | OUTPATIENT
Start: 2021-01-01 | End: 2021-01-01

## 2021-01-01 RX ORDER — MEGESTROL ACETATE 40 MG/ML
200 SUSPENSION ORAL 2 TIMES DAILY
COMMUNITY

## 2021-01-01 RX ORDER — LORAZEPAM 2 MG/ML
0.5 INJECTION INTRAMUSCULAR EVERY 4 HOURS PRN
Status: DISCONTINUED | OUTPATIENT
Start: 2021-01-01 | End: 2021-01-01 | Stop reason: SDUPTHER

## 2021-01-01 RX ORDER — HEPARIN SODIUM 10000 [USP'U]/100ML
12 INJECTION, SOLUTION INTRAVENOUS
Status: DISCONTINUED | OUTPATIENT
Start: 2021-01-01 | End: 2021-01-01

## 2021-01-01 RX ORDER — HEPARIN SODIUM 5000 [USP'U]/ML
60 INJECTION, SOLUTION INTRAVENOUS; SUBCUTANEOUS AS NEEDED
Status: DISCONTINUED | OUTPATIENT
Start: 2021-01-01 | End: 2021-01-01

## 2021-01-01 RX ORDER — ACETAMINOPHEN 650 MG/1
650 SUPPOSITORY RECTAL EVERY 6 HOURS PRN
Status: DISCONTINUED | OUTPATIENT
Start: 2021-01-01 | End: 2021-01-01

## 2021-01-01 RX ORDER — NOREPINEPHRINE BIT/0.9 % NACL 8 MG/250ML
.02-.3 INFUSION BOTTLE (ML) INTRAVENOUS
Status: DISCONTINUED | OUTPATIENT
Start: 2021-01-01 | End: 2021-01-01

## 2021-01-01 RX ORDER — BISACODYL 10 MG
10 SUPPOSITORY, RECTAL RECTAL DAILY PRN
Status: DISCONTINUED | OUTPATIENT
Start: 2021-01-01 | End: 2021-01-01

## 2021-01-01 RX ORDER — ROSUVASTATIN CALCIUM 10 MG/1
10 TABLET, COATED ORAL NIGHTLY
COMMUNITY

## 2021-01-01 RX ORDER — ACETAMINOPHEN 500 MG
500 TABLET ORAL EVERY 6 HOURS PRN
Status: DISCONTINUED | OUTPATIENT
Start: 2021-01-01 | End: 2021-01-01

## 2021-01-01 RX ORDER — ASCORBIC ACID 500 MG
500 TABLET ORAL DAILY
Status: DISCONTINUED | OUTPATIENT
Start: 2021-01-01 | End: 2021-01-01

## 2021-01-01 RX ORDER — GLYCOPYRROLATE 0.2 MG/ML
0.1 INJECTION INTRAMUSCULAR; INTRAVENOUS EVERY 4 HOURS PRN
Status: DISCONTINUED | OUTPATIENT
Start: 2021-01-01 | End: 2021-01-01 | Stop reason: HOSPADM

## 2021-01-01 RX ORDER — AMLODIPINE BESYLATE 5 MG/1
5 TABLET ORAL DAILY
COMMUNITY

## 2021-01-01 RX ORDER — ZINC SULFATE 50(220)MG
220 CAPSULE ORAL 2 TIMES DAILY
Status: DISCONTINUED | OUTPATIENT
Start: 2021-01-01 | End: 2021-01-01

## 2021-01-01 RX ORDER — CASTOR OIL AND BALSAM, PERU 788; 87 MG/G; MG/G
1 OINTMENT TOPICAL EVERY 12 HOURS SCHEDULED
Status: DISCONTINUED | OUTPATIENT
Start: 2021-01-01 | End: 2021-01-01

## 2021-01-01 RX ORDER — ATORVASTATIN CALCIUM 40 MG/1
40 TABLET, FILM COATED ORAL NIGHTLY
Status: DISCONTINUED | OUTPATIENT
Start: 2021-01-01 | End: 2021-01-01

## 2021-01-01 RX ORDER — CLOPIDOGREL BISULFATE 75 MG/1
75 TABLET ORAL DAILY
Status: DISCONTINUED | OUTPATIENT
Start: 2021-01-01 | End: 2021-01-01

## 2021-01-01 RX ORDER — NICOTINE POLACRILEX 4 MG
15 LOZENGE BUCCAL
Status: DISCONTINUED | OUTPATIENT
Start: 2021-01-01 | End: 2021-01-01

## 2021-01-01 RX ORDER — GLYCOPYRROLATE 0.2 MG/ML
0.1 INJECTION INTRAMUSCULAR; INTRAVENOUS EVERY 6 HOURS PRN
Status: DISCONTINUED | OUTPATIENT
Start: 2021-01-01 | End: 2021-01-01 | Stop reason: SDUPTHER

## 2021-01-01 RX ORDER — HEPARIN SODIUM 5000 [USP'U]/ML
30 INJECTION, SOLUTION INTRAVENOUS; SUBCUTANEOUS AS NEEDED
Status: DISCONTINUED | OUTPATIENT
Start: 2021-01-01 | End: 2021-01-01

## 2021-01-01 RX ORDER — DEXTROSE MONOHYDRATE 50 MG/ML
125 INJECTION, SOLUTION INTRAVENOUS CONTINUOUS
Status: DISCONTINUED | OUTPATIENT
Start: 2021-01-01 | End: 2021-01-01

## 2021-01-01 RX ORDER — DEXTROSE MONOHYDRATE 25 G/50ML
25 INJECTION, SOLUTION INTRAVENOUS ONCE
Status: COMPLETED | OUTPATIENT
Start: 2021-01-01 | End: 2021-01-01

## 2021-01-01 RX ORDER — ASPIRIN 81 MG/1
81 TABLET, CHEWABLE ORAL DAILY
Status: DISCONTINUED | OUTPATIENT
Start: 2021-01-01 | End: 2021-01-01

## 2021-01-01 RX ORDER — ZINC SULFATE 50(220)MG
220 CAPSULE ORAL 2 TIMES DAILY
COMMUNITY

## 2021-01-01 RX ADMIN — METRONIDAZOLE 500 MG: 500 INJECTION, SOLUTION INTRAVENOUS at 06:25

## 2021-01-01 RX ADMIN — METRONIDAZOLE 500 MG: 500 INJECTION, SOLUTION INTRAVENOUS at 21:29

## 2021-01-01 RX ADMIN — VASOPRESSIN 0.03 UNITS/MIN: 20 INJECTION INTRAVENOUS at 05:38

## 2021-01-01 RX ADMIN — SODIUM CHLORIDE, PRESERVATIVE FREE 10 ML: 5 INJECTION INTRAVENOUS at 22:00

## 2021-01-01 RX ADMIN — SODIUM CHLORIDE 5 MG/HR: 900 INJECTION, SOLUTION INTRAVENOUS at 08:01

## 2021-01-01 RX ADMIN — AMIODARONE HYDROCHLORIDE 0.5 MG/MIN: 1.8 INJECTION, SOLUTION INTRAVENOUS at 06:39

## 2021-01-01 RX ADMIN — SODIUM CHLORIDE, PRESERVATIVE FREE 10 ML: 5 INJECTION INTRAVENOUS at 08:34

## 2021-01-01 RX ADMIN — SODIUM CHLORIDE, PRESERVATIVE FREE 10 ML: 5 INJECTION INTRAVENOUS at 08:32

## 2021-01-01 RX ADMIN — SODIUM CHLORIDE, PRESERVATIVE FREE 10 ML: 5 INJECTION INTRAVENOUS at 20:07

## 2021-01-01 RX ADMIN — SODIUM CHLORIDE, PRESERVATIVE FREE 10 ML: 5 INJECTION INTRAVENOUS at 21:30

## 2021-01-01 RX ADMIN — CASTOR OIL AND BALSAM, PERU 1 APPLICATION: 788; 87 OINTMENT TOPICAL at 09:25

## 2021-01-01 RX ADMIN — FAMOTIDINE 20 MG: 10 INJECTION INTRAVENOUS at 08:33

## 2021-01-01 RX ADMIN — SODIUM CHLORIDE, PRESERVATIVE FREE 10 ML: 5 INJECTION INTRAVENOUS at 08:13

## 2021-01-01 RX ADMIN — METRONIDAZOLE 500 MG: 500 INJECTION, SOLUTION INTRAVENOUS at 21:48

## 2021-01-01 RX ADMIN — SODIUM CHLORIDE, PRESERVATIVE FREE 10 ML: 5 INJECTION INTRAVENOUS at 08:33

## 2021-01-01 RX ADMIN — INSULIN ASPART 3 UNITS: 100 INJECTION, SOLUTION INTRAVENOUS; SUBCUTANEOUS at 00:30

## 2021-01-01 RX ADMIN — SODIUM CHLORIDE, PRESERVATIVE FREE 10 ML: 5 INJECTION INTRAVENOUS at 21:29

## 2021-01-01 RX ADMIN — VANCOMYCIN HYDROCHLORIDE 750 MG: 5 INJECTION, POWDER, LYOPHILIZED, FOR SOLUTION INTRAVENOUS at 12:48

## 2021-01-01 RX ADMIN — SODIUM CHLORIDE, PRESERVATIVE FREE 10 ML: 5 INJECTION INTRAVENOUS at 21:26

## 2021-01-01 RX ADMIN — HEPARIN SODIUM 5000 UNITS: 5000 INJECTION INTRAVENOUS; SUBCUTANEOUS at 08:01

## 2021-01-01 RX ADMIN — POTASSIUM CHLORIDE 10 MEQ: 7.46 INJECTION, SOLUTION INTRAVENOUS at 18:30

## 2021-01-01 RX ADMIN — DEXTROSE MONOHYDRATE 100 ML/HR: 50 INJECTION, SOLUTION INTRAVENOUS at 12:03

## 2021-01-01 RX ADMIN — NOREPINEPHRINE BITARTRATE 0.3 MCG/KG/MIN: 1 INJECTION, SOLUTION, CONCENTRATE INTRAVENOUS at 17:21

## 2021-01-01 RX ADMIN — SODIUM CHLORIDE, PRESERVATIVE FREE 10 ML: 5 INJECTION INTRAVENOUS at 09:51

## 2021-01-01 RX ADMIN — HEPARIN SODIUM 5000 UNITS: 5000 INJECTION INTRAVENOUS; SUBCUTANEOUS at 21:48

## 2021-01-01 RX ADMIN — HEPARIN SODIUM 5000 UNITS: 5000 INJECTION INTRAVENOUS; SUBCUTANEOUS at 09:25

## 2021-01-01 RX ADMIN — CEFEPIME HYDROCHLORIDE 2 G: 2 INJECTION, POWDER, FOR SOLUTION INTRAVENOUS at 00:10

## 2021-01-01 RX ADMIN — HEPARIN SODIUM 12 UNITS/KG/HR: 10000 INJECTION, SOLUTION INTRAVENOUS at 22:41

## 2021-01-01 RX ADMIN — METRONIDAZOLE 500 MG: 500 INJECTION, SOLUTION INTRAVENOUS at 15:08

## 2021-01-01 RX ADMIN — METRONIDAZOLE 500 MG: 500 INJECTION, SOLUTION INTRAVENOUS at 15:13

## 2021-01-01 RX ADMIN — SODIUM CHLORIDE, PRESERVATIVE FREE 10 ML: 5 INJECTION INTRAVENOUS at 09:50

## 2021-01-01 RX ADMIN — KIT FOR THE PREPARATION OF TECHNETIUM TC 99M ALBUMIN AGGREGATED 1 DOSE: 2.5 INJECTION, POWDER, FOR SOLUTION INTRAVENOUS at 18:15

## 2021-01-01 RX ADMIN — PHENYLEPHRINE HYDROCHLORIDE 1.1 MCG/KG/MIN: 10 INJECTION INTRAVENOUS at 17:24

## 2021-01-01 RX ADMIN — SODIUM BICARBONATE 75 ML/HR: 84 INJECTION, SOLUTION INTRAVENOUS at 10:53

## 2021-01-01 RX ADMIN — NOREPINEPHRINE BITARTRATE 0.16 MCG/KG/MIN: 1 INJECTION, SOLUTION, CONCENTRATE INTRAVENOUS at 17:29

## 2021-01-01 RX ADMIN — PHENYLEPHRINE HYDROCHLORIDE 0.5 MCG/KG/MIN: 10 INJECTION INTRAVENOUS at 05:48

## 2021-01-01 RX ADMIN — AMIODARONE HYDROCHLORIDE 0.5 MG/MIN: 1.8 INJECTION, SOLUTION INTRAVENOUS at 17:30

## 2021-01-01 RX ADMIN — CEFEPIME HYDROCHLORIDE 2 G: 2 INJECTION, POWDER, FOR SOLUTION INTRAVENOUS at 23:30

## 2021-01-01 RX ADMIN — NOREPINEPHRINE BITARTRATE 0.16 MCG/KG/MIN: 1 INJECTION, SOLUTION, CONCENTRATE INTRAVENOUS at 05:38

## 2021-01-01 RX ADMIN — CEFEPIME HYDROCHLORIDE 2 G: 2 INJECTION, POWDER, FOR SOLUTION INTRAVENOUS at 00:04

## 2021-01-01 RX ADMIN — INSULIN ASPART 3 UNITS: 100 INJECTION, SOLUTION INTRAVENOUS; SUBCUTANEOUS at 01:08

## 2021-01-01 RX ADMIN — DEXTROSE MONOHYDRATE 125 ML/HR: 50 INJECTION, SOLUTION INTRAVENOUS at 22:40

## 2021-01-01 RX ADMIN — METRONIDAZOLE 500 MG: 500 INJECTION, SOLUTION INTRAVENOUS at 07:18

## 2021-01-01 RX ADMIN — METRONIDAZOLE 500 MG: 500 INJECTION, SOLUTION INTRAVENOUS at 22:45

## 2021-01-01 RX ADMIN — VANCOMYCIN HYDROCHLORIDE 750 MG: 5 INJECTION, POWDER, LYOPHILIZED, FOR SOLUTION INTRAVENOUS at 14:56

## 2021-01-01 RX ADMIN — NOREPINEPHRINE BITARTRATE 0.3 MCG/KG/MIN: 1 INJECTION, SOLUTION, CONCENTRATE INTRAVENOUS at 05:35

## 2021-01-01 RX ADMIN — CASTOR OIL AND BALSAM, PERU 1 APPLICATION: 788; 87 OINTMENT TOPICAL at 21:00

## 2021-01-01 RX ADMIN — VASOPRESSIN 0.03 UNITS/MIN: 20 INJECTION INTRAVENOUS at 20:12

## 2021-01-01 RX ADMIN — SODIUM CHLORIDE 125 ML/HR: 900 INJECTION INTRAVENOUS at 15:58

## 2021-01-01 RX ADMIN — SODIUM BICARBONATE 50 MEQ: 84 INJECTION INTRAVENOUS at 06:43

## 2021-01-01 RX ADMIN — SODIUM CHLORIDE 500 ML: 9 INJECTION, SOLUTION INTRAVENOUS at 14:43

## 2021-01-01 RX ADMIN — HEPARIN SODIUM 2400 UNITS: 5000 INJECTION INTRAVENOUS; SUBCUTANEOUS at 22:41

## 2021-01-01 RX ADMIN — METRONIDAZOLE 500 MG: 500 INJECTION, SOLUTION INTRAVENOUS at 13:23

## 2021-01-01 RX ADMIN — ASPIRIN 300 MG: 300 SUPPOSITORY RECTAL at 15:58

## 2021-01-01 RX ADMIN — CALCIUM GLUCONATE: 98 INJECTION, SOLUTION INTRAVENOUS at 18:30

## 2021-01-01 RX ADMIN — FAMOTIDINE 20 MG: 10 INJECTION INTRAVENOUS at 09:25

## 2021-01-01 RX ADMIN — CALCIUM GLUCONATE 1 G: 98 INJECTION, SOLUTION INTRAVENOUS at 18:30

## 2021-01-01 RX ADMIN — AMIODARONE HYDROCHLORIDE 150 MG: 50 INJECTION, SOLUTION INTRAVENOUS at 12:01

## 2021-01-01 RX ADMIN — VASOPRESSIN 0.03 UNITS/MIN: 20 INJECTION INTRAVENOUS at 17:28

## 2021-01-01 RX ADMIN — SODIUM CHLORIDE 1000 ML: 9 INJECTION, SOLUTION INTRAVENOUS at 13:29

## 2021-01-01 RX ADMIN — MORPHINE SULFATE 1 MG: 2 INJECTION, SOLUTION INTRAMUSCULAR; INTRAVENOUS at 16:29

## 2021-01-01 RX ADMIN — HYALURONIDASE (HUMAN RECOMBINANT) 150 UNITS: 150 INJECTION, SOLUTION SUBCUTANEOUS at 16:54

## 2021-01-01 RX ADMIN — SODIUM CHLORIDE, PRESERVATIVE FREE 10 ML: 5 INJECTION INTRAVENOUS at 22:01

## 2021-01-01 RX ADMIN — POTASSIUM CHLORIDE 10 MEQ: 7.46 INJECTION, SOLUTION INTRAVENOUS at 18:26

## 2021-01-01 RX ADMIN — HEPARIN SODIUM 5000 UNITS: 5000 INJECTION INTRAVENOUS; SUBCUTANEOUS at 21:29

## 2021-01-01 RX ADMIN — VASOPRESSIN 0.03 UNITS/MIN: 20 INJECTION INTRAVENOUS at 08:55

## 2021-01-01 RX ADMIN — DEXTROSE MONOHYDRATE 25 G: 25 INJECTION, SOLUTION INTRAVENOUS at 15:04

## 2021-01-01 RX ADMIN — INSULIN ASPART 3 UNITS: 100 INJECTION, SOLUTION INTRAVENOUS; SUBCUTANEOUS at 18:42

## 2021-01-01 RX ADMIN — DEXTROSE MONOHYDRATE 75 ML/HR: 50 INJECTION, SOLUTION INTRAVENOUS at 01:32

## 2021-01-01 RX ADMIN — NOREPINEPHRINE BITARTRATE 0.02 MCG/KG/MIN: 1 INJECTION, SOLUTION, CONCENTRATE INTRAVENOUS at 16:53

## 2021-01-01 RX ADMIN — SODIUM CHLORIDE, PRESERVATIVE FREE 10 ML: 5 INJECTION INTRAVENOUS at 21:31

## 2021-01-01 RX ADMIN — Medication 750 MG: at 15:37

## 2021-01-01 RX ADMIN — INSULIN ASPART 3 UNITS: 100 INJECTION, SOLUTION INTRAVENOUS; SUBCUTANEOUS at 13:23

## 2021-01-01 RX ADMIN — ACETAMINOPHEN 650 MG: 650 SUPPOSITORY RECTAL at 21:48

## 2021-01-01 RX ADMIN — FAMOTIDINE 20 MG: 10 INJECTION INTRAVENOUS at 08:01

## 2021-01-01 RX ADMIN — PIPERACILLIN SODIUM AND TAZOBACTAM SODIUM 3.38 G: 3; .375 INJECTION, POWDER, LYOPHILIZED, FOR SOLUTION INTRAVENOUS at 14:44

## 2021-01-01 RX ADMIN — METRONIDAZOLE 500 MG: 500 INJECTION, SOLUTION INTRAVENOUS at 05:35

## 2021-01-01 RX ADMIN — SODIUM CHLORIDE 500 ML: 9 INJECTION, SOLUTION INTRAVENOUS at 11:00

## 2021-01-01 RX ADMIN — FAMOTIDINE 20 MG: 10 INJECTION INTRAVENOUS at 22:42

## 2021-01-01 RX ADMIN — INSULIN ASPART 3 UNITS: 100 INJECTION, SOLUTION INTRAVENOUS; SUBCUTANEOUS at 07:19

## 2021-01-01 RX ADMIN — AMIODARONE HYDROCHLORIDE 1 MG/MIN: 1.8 INJECTION, SOLUTION INTRAVENOUS at 12:03

## 2021-01-01 RX ADMIN — SODIUM BICARBONATE 75 MEQ: 84 INJECTION, SOLUTION INTRAVENOUS at 00:07

## 2021-01-01 RX ADMIN — SODIUM CHLORIDE, PRESERVATIVE FREE 10 ML: 5 INJECTION INTRAVENOUS at 08:14

## 2021-10-01 PROBLEM — A41.9 SEPTIC SHOCK (HCC): Status: ACTIVE | Noted: 2021-01-01

## 2021-10-01 PROBLEM — R65.21 SEPTIC SHOCK (HCC): Status: ACTIVE | Noted: 2021-01-01

## 2021-10-01 NOTE — ED NOTES
Attempted to call pts guardian twice with no answer, unable to leave message     Vanda Beard, RN  10/01/21 1141

## 2021-10-01 NOTE — ED NOTES
Consent for central line placement given by emigdio parker, [facility] guardianship office, witnessed by dr. shirley Beard, Vanda Robins, RN  10/01/21 6762

## 2021-10-01 NOTE — H&P
Hospitalist History and Physical        Patient Identification  Name: Lolis Infante  Age/Sex: 89 y.o. female  :  1929        MRN: [id number]  Visit Number: [id number]  Admit Date: 10/1/2021   PCP: Cristal Linn MD          Chief complaint respiratory distress    History of Present Illness:  Patient is a 89 y.o. female nursing home resident with history of stage III CKD, dementia, cognitive communication deficit, grade 1 diastolic CHF with cardiomegaly, HTN, gait instability and malnutrition, who presents with reports of respiratory distress. Per nursing home staff, the patient exhibited increased shortness of breath today. Her roommate reportedly has COVID-19. EMS reports she was hypoxic with O2 sat in the 40% range upon arrival. They placed her on nasal cannula 6L and 100% NRB mask and her O2 sat improved to the 80s. She was also noted to be hypotensive. She is unable to give any history herself.     Upon arrival to the ED, she was tachypneic with RR 32, hypotensive with BP 61/43, with O2 sat 100%. ABG showed adequate oxygenation with PO2 92, sat 97, pH 7.357 with CO2 25 and bicarb of 14 on the 100% NRB mask. She has been weaned down to 6L NC and O2 sat has been documented as staying in the 90s. Repeat ABG is pending. Labs showed elevated troponin and BNP, severe hypernatremia, acute on CKD, low bicarb, CRP of 2.04 and lactate of 5.7, d-dimer >20, WBC 1.67 with normal hemoglobin and platelets but likely falsely high from dehydration, with 6% bands. UA suggests possible UTI though could be contaminated with 7-12 squamous epithelial cells present. Chest XR showed bibasilar airspace disease. She remained hypotensive despite IV fluid bolus, so IV levophed was initiated. She has an appointed guardian who confirmed over the phone that she is DNR/DNI. She is being admitted to the CCU.     Review of Systems  Review of Systems   Unable to perform ROS: Patient nonverbal (she has acute encephalopathy superimposed  on dementia)       History  Past Medical History:   Diagnosis Date   • Advanced age    • Cardiomegaly    • Chronic kidney disease (CKD), stage III (moderate) (CMS/HCC)    • Cognitive communication deficit    • Dementia (CMS/HCC)    • Dysphagia    • Gait instability    • Grade I diastolic dysfunction    • Hypertension    • Malnutrition (CMS/HCC)      No past surgical history on file.  Family History   Family history unknown: Yes     Social History     Tobacco Use   • Smoking status: Never Smoker   Substance Use Topics   • Alcohol use: No   • Drug use: No     (Not in a hospital admission)    Allergies:  Patient has no known allergies.    Objective     Vital Signs  Temp:  [94.6 °F (34.8 °C)-99 °F (37.2 °C)] 99 °F (37.2 °C)  Heart Rate:  [] 126  Resp:  [32] 32  BP: ()/(34-97) 122/77  Body mass index is 17.19 kg/m².    Physical Exam:  Physical Exam  Constitutional:       Comments: Elderly female, critically ill, tachypneic, eyes open some on occasion, but not speaking or following commands.    HENT:      Head: Normocephalic and atraumatic.      Right Ear: External ear normal.      Left Ear: External ear normal.      Nose: Nose normal.      Mouth/Throat:      Mouth: Mucous membranes are dry.      Pharynx: Oropharynx is clear.   Eyes:      Extraocular Movements: Extraocular movements intact.      Conjunctiva/sclera: Conjunctivae normal.      Pupils: Pupils are equal, round, and reactive to light.   Neck:      Comments: Central line in place right subclavian  Cardiovascular:      Rate and Rhythm: Regular rhythm. Tachycardia present.      Pulses: Normal pulses.      Heart sounds: No murmur heard.     Pulmonary:      Comments: Tachypneic; rhonchorous breath sounds bilaterally, more pronounced on left side  Abdominal:      General: Abdomen is flat. Bowel sounds are normal. There is no distension.      Palpations: Abdomen is soft.      Tenderness: There is no abdominal tenderness.   Musculoskeletal:      Cervical  back: Neck supple. No tenderness.      Right lower leg: Edema (trace pedal edema) present.      Left lower leg: Edema (trace pedal edema) present.   Lymphadenopathy:      Cervical: No cervical adenopathy.   Skin:     General: Skin is warm and dry.      Capillary Refill: Capillary refill takes less than 2 seconds.      Coloration: Skin is not jaundiced.      Findings: Bruising present.   Neurological:      Comments: Obtunded, not speaking or following commands, only response is will occasionally open eyes slightly   Psychiatric:      Comments: Unable to assess           Results Review:       Lab Results:  Results from last 7 days   Lab Units 10/01/21  1400   WBC 10*3/mm3 1.67*   HEMOGLOBIN g/dL 14.9   PLATELETS 10*3/mm3 147     Results from last 7 days   Lab Units 10/01/21  1400   CRP mg/dL 2.04*     Results from last 7 days   Lab Units 10/01/21  1400   SODIUM mmol/L 179*   POTASSIUM mmol/L 3.9   CHLORIDE mmol/L >140*   CO2 mmol/L 14.2*   BUN mg/dL 98*   CREATININE mg/dL 2.98*   CALCIUM mg/dL 8.4   GLUCOSE mg/dL 79         No results found for: HGBA1C  Results from last 7 days   Lab Units 10/01/21  1400   BILIRUBIN mg/dL 0.6   ALK PHOS U/L 93   AST (SGOT) U/L 32   ALT (SGPT) U/L 18     Results from last 7 days   Lab Units 10/01/21  1400   TROPONIN T ng/mL 0.216*             Results from last 7 days   Lab Units 10/01/21  1333   PH, ARTERIAL pH units 7.357   PO2 ART mm Hg 92.5   PCO2, ARTERIAL mm Hg 25.3*   HCO3 ART mmol/L 14.2*       I have reviewed the patient's laboratory results.    Imaging:  Imaging Results (Last 72 Hours)     Procedure Component Value Units Date/Time    XR Chest AP [[id number]] Collected: 10/01/21 1933     Updated: 10/01/21 1935    Narrative:      XR CHEST AP-     CLINICAL INDICATION: central line; A41.9-Sepsis, unspecified organism;  R65.21-Severe sepsis with septic shock; J18.9-Pneumonia, unspecified  organism; N39.0-Urinary tract infection, site not specified; N17.9-Acute  kidney failure,  unspecified; E86.0-Dehydration; E87.0-Hyperosmolality  and hypernatremia; R79.89-Other specified abnormal findings of blood  chemistry; R77.8-Other specified abnormalities of plasma proteins        COMPARISON: 10/01/2021      TECHNIQUE: Single frontal view of the chest.     FINDINGS:     Right-sided central line tip in superior vena cava. No pneumothorax  The cardiac silhouette is normal. The pulmonary vasculature is  unremarkable.  There is no evidence of an acute osseous abnormality.   There are no suspicious-appearing parenchymal soft tissue nodules.          Impression:         1. Central line as above  2. Bibasilar airspace disease     This report was finalized on 10/1/2021 7:33 PM by Dr. Sanjeev Calero MD.       NM Lung Scan Perfusion Particulate [[id number]] Collected: 10/01/21 1845     Updated: 10/01/21 1848    Narrative:      EXAMINATION: NM LUNG SCAN PERFUSION PARTICULATE-      CLINICAL INDICATION: Elevated D-dimer        COMPARISON: Chest x-ray 10/01/2021     PROCEDURE:  4.1 mCi technetium MAA was administered.  Perfusion images were then acquired.     FINDINGS:  Fairly homogeneous distribution of the radiotracer. No segmental defects  are seen.       Impression:      Based on the perfusion alone, appearance most suggestive of  low likelihood of pulmonary embolus         This report was finalized on 10/1/2021 6:46 PM by Dr. Sanjeev Calero MD.       XR Chest 1 View [[id number]] Collected: 10/01/21 1451     Updated: 10/01/21 1453    Narrative:      XR CHEST 1 VW-     CLINICAL INDICATION: Respiratory distress        COMPARISON: 07/24/2020      TECHNIQUE: Single frontal view of the chest.     FINDINGS:     Bilateral airspace disease  The cardiac silhouette is normal. The pulmonary vasculature is  unremarkable.  There is no evidence of an acute osseous abnormality.   There are no suspicious-appearing parenchymal soft tissue nodules.          Impression:      Bilateral airspace disease     This report was finalized  on 10/1/2021 2:51 PM by Dr. Sanjeev Calero MD.             I have personally reviewed the patient's radiologic imaging.        EKG:   Accelerated Junctional rhythm, HR 77, QTc 307  Poor data quality  Low voltage QRS  ST & T wave abnormality, consider inferolateral ischemia  Abnormal ECG  When compared with ECG of 20-JUL-2020 21:41,  Significant changes have occurred  Confirmed by Zina Mukherjee ([id number]) on 10/1/2021 4:53:53 PM    I have personally reviewed the patient's EKG. Very difficult to interpret due to poor quality.         Assessment/Plan     - Septic shock (HCC), present on admission with tachycardia, tachypnea, leukopenia with bandemia, CRP elevation, lactic acidosis and hypotension refractory to IV fluid challenge and requiring vasopressor support. Suspect source is bibasilar pneumonia and possible UTI. Received vanc and zosyn in the ED. I have ordered cefepime and flagyl to cover for HCAP, with MRSA nasal swab ordered to determine if vancomycin is needed. Also ordered XR to look for evidence of osteomyelitis of left 1st toe which is another potential source. If this is the case, will need vancomycin on board even if MRSA nasal swab is negative. Follow up blood and urine cultures. Screened negative for COVID-19 despite reports of exposure to roommate with COVID-19. Send respiratory PCR to rule out other etiologies of pneumonia. Continue to trend lactate, CRP, WBC, differential. Continue IV fluids for now (see details below). Continue IV levophed, if remains hypotensive will need additional vasopressors ordered. Confirmed code status DNR/DNI with state appointed guardian. Still, will admit to CCU as patient is at high risk for needing additional vasopressors added since BP still marginal with levophed running.  - Severe hypernatremia, acute on stage III CKD, elevated BUN:cr ratio suggesting severe dehydration: IV normal saline boluses given near time of arrival to ED and now receiving maintenance IV NS at  125cc/hr. Spoke with nephrology who has been consulted--Dr Belle recommends checking BMP now, and if Na has dropped by less than 10, he recommends starting D5W. If it drops 10 or more, he recommends holding IV fluids and continuing to monitor BMP q4h. If Na drops 12 or more, he has asked to be contacted for additional fluid orders.  - Troponin, BNP elevation in setting of history of grade 1 diastolic CHF: clinically appears hypovolemic and very dry. Both values may simply be elevated due to septic shock and acute on CKD. Continue to trend troponin--if it goes up despite aggressive IV fluids and improved perfusion from vasopressors, then will consult cardiology.  Already received rectal ASA in the ED and will start heparin drip in light of critically elevated d-dimer (see below). ECHO scheduled for tomorrow to evaluate further.  - Acute hypoxic respiratory failure, likely secondary to pneumonia: initial ABG obtained on 100% NRB. On 6L NC now; O2 sat was in the upper 80s during my time in the ER with her. Repeat ABG pending.   - Elevated d-dimer >20: VQ scan low probability for PE. Venous doppler lower extremities ordered for the morning. Suspect significantly elevated from septic shock, but will empirically start heparin drip while awaiting doppler results.   - Left 1st toe medial ulcer, with concern for underlying bone exposure: XR foot ordered to evaluate further. On broad spectrum IV antibiotics as noted above.    DVT/GI Prophylaxis: SQ heparin; IV pepcid    Estimated Length of Stay >2 midnights    I discussed the patient's findings, assessment and plan with ED provider Dr Apodaca and state-appointed guardian on-call telephone service.    * patient is high risk due to septic shock, bibasilar pneumonia, UTI, severe hypernatremia, acute on stage III CKD, prerenal azotemia, dehydration, troponin and BNP elevation, acute hypoxic respiratory failure, advanced age    Total critical care time 50 minutes    Jose Francisco Feldman  MD Yusuf  10/01/21  19:51 EDT

## 2021-10-01 NOTE — ED PROVIDER NOTES
Subjective   89-year-old white female presents for respiratory distress.  Patient is unable to give history.  History from nursing home and EMS reports that the patient had increased shortness of breath today.  Her roommate reportedly has Covid.  She was hypoxic with O2 sats 40% on EMS arrival.  They placed her on cannula and nonrebreather and her O2 sats improved to the 80s.  She also was noted to have low blood pressure.  No other history of present illness is available.          Review of Systems   All other systems reviewed and are negative.      Past Medical History:   Diagnosis Date   • Advanced age    • Cardiomegaly    • Chronic kidney disease (CKD), stage III (moderate) (CMS/HCC)    • Cognitive communication deficit    • Dementia (CMS/HCC)    • Dysphagia    • Gait instability    • Grade I diastolic dysfunction    • Hypertension    • Malnutrition (CMS/HCC)        No Known Allergies    No past surgical history on file.    Family History   Family history unknown: Yes       Social History     Socioeconomic History   • Marital status:      Spouse name: Not on file   • Number of children: Not on file   • Years of education: Not on file   • Highest education level: Not on file   Tobacco Use   • Smoking status: Never Smoker   Substance and Sexual Activity   • Alcohol use: No   • Drug use: No   • Sexual activity: Defer           Objective   Physical Exam  Vitals and nursing note reviewed. Chaperone present: Kristy Beard RN.   Constitutional:       Appearance: Normal appearance. She is cachectic.   HENT:      Head: Normocephalic and atraumatic.      Comments: Patient appears to have food or secretions in her oropharynx, but she resisted exam and this was unable to be closely examined.     Mouth/Throat:      Mouth: Mucous membranes are moist.      Pharynx: Oropharynx is clear.   Cardiovascular:      Rate and Rhythm: Normal rate and regular rhythm.      Heart sounds: Normal heart sounds. No murmur heard.   No  friction rub. No gallop.    Pulmonary:      Effort: Pulmonary effort is normal.      Breath sounds: Decreased air movement present. Examination of the right-lower field reveals rales. Examination of the left-lower field reveals rales. Rales present. No wheezing or rhonchi.   Abdominal:      General: Abdomen is flat. Bowel sounds are normal. There is no distension.      Palpations: Abdomen is soft.      Tenderness: There is no abdominal tenderness.   Musculoskeletal:         General: Normal range of motion.      Right lower leg: No edema.      Left lower leg: No edema.   Skin:     General: Skin is warm and dry.      Comments: Perioral cyanosis .  Large area of resolving ecchymosis over the proximal left anterior biceps extending across the shoulder and to the superior pectoral region on the left.  She also has ecchymosis in a similar healing stage (approximately 7-10 days old) in the sternal area, left anterior chest and breast.   Neurological:      Mental Status: She is lethargic.      Comments: Awake, tracks with her eyes.  Nonverbal.  Rarely follows commands.  Bilateral lower extremity contractures at hip and knee.  Moves upper extremities.         Procedures  Results for orders placed or performed during the hospital encounter of 10/01/21   COVID-19 and FLU A/B PCR - Swab, Nasopharynx    Specimen: Nasopharynx; Swab   Result Value Ref Range    COVID19 Not Detected Not Detected - Ref. Range    Influenza A PCR Not Detected Not Detected    Influenza B PCR Not Detected Not Detected   Blood Gas, Arterial With Co-Ox    Specimen: Arterial Blood   Result Value Ref Range    Site Left Femoral     Umang's Test N/A     pH, Arterial 7.357 7.350 - 7.450 pH units    pCO2, Arterial 25.3 (L) 35.0 - 45.0 mm Hg    pO2, Arterial 92.5 83.0 - 108.0 mm Hg    HCO3, Arterial 14.2 (L) 20.0 - 26.0 mmol/L    Base Excess, Arterial -9.5 (L) 0.0 - 2.0 mmol/L    O2 Saturation, Arterial 97.1 94.0 - 99.0 %    Hemoglobin, Blood Gas 14.3 13.5 - 17.5  g/dL    Hematocrit, Blood Gas 43.8 38.0 - 51.0 %    Oxyhemoglobin 96.5 94 - 99 %    Methemoglobin 0.00 0.00 - 3.00 %    Carboxyhemoglobin 0.6 0 - 5 %    A-a Gradiant 567.2 (H) 0.0 - 300.0 mmHg    CO2 Content 15.0 (L) 22 - 33 mmol/L    Temperature 0.0 C    Barometric Pressure for Blood Gas 732 mmHg    Modality NRB     FIO2 100 %    Ventilator Mode NA     Note      Collected by [id number]     pH, Temp Corrected      pCO2, Temperature Corrected      pO2, Temperature Corrected     Comprehensive Metabolic Panel    Specimen: Arm, Left; Blood   Result Value Ref Range    Glucose 79 65 - 99 mg/dL    BUN 98 (H) 8 - 23 mg/dL    Creatinine 2.98 (H) 0.57 - 1.00 mg/dL    Sodium 179 (C) 136 - 145 mmol/L    Potassium 3.9 3.5 - 5.2 mmol/L    Chloride >140 (H) 98 - 107 mmol/L    CO2 14.2 (L) 22.0 - 29.0 mmol/L    Calcium 8.4 8.2 - 9.6 mg/dL    Total Protein 6.5 6.0 - 8.5 g/dL    Albumin 2.89 (L) 3.50 - 5.20 g/dL    ALT (SGPT) 18 1 - 33 U/L    AST (SGOT) 32 1 - 32 U/L    Alkaline Phosphatase 93 39 - 117 U/L    Total Bilirubin 0.6 0.0 - 1.2 mg/dL    eGFR Non African Amer 15 (L) >60 mL/min/1.73    Globulin 3.6 gm/dL    A/G Ratio 0.8 g/dL    BUN/Creatinine Ratio 32.9 (H) 7.0 - 25.0    Anion Gap     Urinalysis With Culture If Indicated - Urine, Catheter    Specimen: Urine, Catheter   Result Value Ref Range    Color, UA Yellow Yellow, Straw    Appearance, UA Turbid (A) Clear    pH, UA 6.0 5.0 - 8.0    Specific Gravity, UA 1.025 1.005 - 1.030    Glucose, UA Negative Negative    Ketones, UA Negative Negative    Bilirubin, UA Negative Negative    Blood, UA Moderate (2+) (A) Negative    Protein, UA 30 mg/dL (1+) (A) Negative    Leuk Esterase, UA Moderate (2+) (A) Negative    Nitrite, UA Negative Negative    Urobilinogen, UA 0.2 E.U./dL 0.2 - 1.0 E.U./dL   Troponin    Specimen: Arm, Left; Blood   Result Value Ref Range    Troponin T 0.216 (C) 0.000 - 0.030 ng/mL   BNP    Specimen: Arm, Left; Blood   Result Value Ref Range    proBNP 4,279.0 (H)  0.0-1,800.0 pg/mL   D-dimer, Quantitative    Specimen: Arm, Left; Blood   Result Value Ref Range    D-Dimer, Quantitative >20.00 (C) 0.00 - 0.50 MCGFEU/mL   Lactic Acid, Plasma    Specimen: Arm, Left; Blood   Result Value Ref Range    Lactate 5.7 (C) 0.5 - 2.0 mmol/L   CBC Auto Differential    Specimen: Arm, Left; Blood   Result Value Ref Range    WBC 1.67 (C) 3.40 - 10.80 10*3/mm3    RBC 4.81 3.77 - 5.28 10*6/mm3    Hemoglobin 14.9 12.0 - 15.9 g/dL    Hematocrit 51.8 (H) 34.0 - 46.6 %    .7 (H) 79.0 - 97.0 fL    MCH 31.0 26.6 - 33.0 pg    MCHC 28.8 (L) 31.5 - 35.7 g/dL    RDW 17.8 (H) 12.3 - 15.4 %    RDW-SD 71.0 (H) 37.0 - 54.0 fl    MPV 12.3 (H) 6.0 - 12.0 fL    Platelets 147 140 - 450 10*3/mm3   Scan Slide    Specimen: Arm, Left; Blood   Result Value Ref Range    Scan Slide     Manual Differential    Specimen: Arm, Left; Blood   Result Value Ref Range    Neutrophil % 47.0 42.7 - 76.0 %    Lymphocyte % 37.0 19.6 - 45.3 %    Monocyte % 6.0 5.0 - 12.0 %    Bands %  6.0 (H) 0.0 - 5.0 %    Metamyelocyte % 4.0 (H) 0.0 - 0.0 %    Neutrophils Absolute 0.89 (L) 1.70 - 7.00 10*3/mm3    Lymphocytes Absolute 0.62 (L) 0.70 - 3.10 10*3/mm3    Monocytes Absolute 0.10 0.10 - 0.90 10*3/mm3    nRBC 2.0 (H) 0.0 - 0.2 /100 WBC    Anisocytosis Mod/2+ None Seen    Lissette Cells Slight/1+ None Seen    Dacrocytes Slight/1+ None Seen    Poikilocytes Slight/1+ None Seen    Polychromasia Slight/1+ None Seen    Giant Platelets Slight/1+ None Seen   STAT Lactic Acid, Reflex    Specimen: Arm, Right; Blood   Result Value Ref Range    Lactate 5.1 (C) 0.5 - 2.0 mmol/L   Urinalysis, Microscopic Only - Urine, Catheter    Specimen: Urine, Catheter   Result Value Ref Range    RBC, UA 3-5 (A) None Seen, 0-2 /HPF    WBC, UA 13-20 (A) None Seen, 0-2 /HPF    Bacteria, UA 1+ (A) None Seen /HPF    Squamous Epithelial Cells, UA 7-12 (A) None Seen, 0-2 /HPF    Hyaline Casts, UA None Seen None Seen /LPF    Methodology Automated Microscopy    C-reactive  Protein    Specimen: Arm, Left; Blood   Result Value Ref Range    C-Reactive Protein 2.04 (H) 0.00 - 0.50 mg/dL   POC Glucose Once    Specimen: Blood   Result Value Ref Range    Glucose 67 (L) 70 - 130 mg/dL   ECG 12 Lead   Result Value Ref Range    QT Interval 272 ms    QTC Interval 307 ms     NM Lung Scan Perfusion Particulate    Result Date: 10/1/2021  Narrative: EXAMINATION: NM LUNG SCAN PERFUSION PARTICULATE-  CLINICAL INDICATION: Elevated D-dimer   COMPARISON: Chest x-ray 10/01/2021  PROCEDURE: 4.1 mCi technetium MAA was administered. Perfusion images were then acquired.  FINDINGS: Fairly homogeneous distribution of the radiotracer. No segmental defects are seen.      Impression: Based on the perfusion alone, appearance most suggestive of low likelihood of pulmonary embolus   This report was finalized on 10/1/2021 6:46 PM by Dr. Sanjeev Calero MD.      XR Chest 1 View    Result Date: 10/1/2021  Narrative: XR CHEST 1 VW-  CLINICAL INDICATION: Respiratory distress   COMPARISON: 07/24/2020  TECHNIQUE: Single frontal view of the chest.  FINDINGS:  Bilateral airspace disease The cardiac silhouette is normal. The pulmonary vasculature is unremarkable. There is no evidence of an acute osseous abnormality. There are no suspicious-appearing parenchymal soft tissue nodules.       Impression: Bilateral airspace disease  This report was finalized on 10/1/2021 2:51 PM by Dr. Sanjeev Calero MD.      SEPTIC SHOCK FOCUSED EXAM ATTESTATION    I attest that I have reassessed tissue perfusion after the fluid bolus given.    Stephon Apodaca MD  10/01/21  19:27 EDT         ED Course  ED Course as of Oct 01 1927   Fri Oct 01, 2021   1553 Sinus rhythm.  Rate 77.  Low voltage with severe artifact in limb leads.  Q waves in lead III and V1.  No acute ischemic changes.  Abnormal EKG.  Interpreted by me.   ECG 12 Lead [BC]   1830 Patient with multiple severe problems today.  Patient is a foster of the [facility].  Despite multiple attempts, the  guardian has not been able to be contacted.  Currently the patient is being treated for sepsis, dehydration, neutropenia etc.  Her O2 sats are acceptable.  She is hypotensive which is improved with Levophed.  We will plan to place central line.  Have discussed with Dr. Cortez.  Who will plan admission to PCU.    [BC]      ED Course User Index  [BC] Stephon Apodaca MD                                           MDM  Number of Diagnoses or Management Options     Amount and/or Complexity of Data Reviewed  Clinical lab tests: reviewed  Tests in the radiology section of CPT®: reviewed  Tests in the medicine section of CPT®: reviewed  Decide to obtain previous medical records or to obtain history from someone other than the patient: yes    Risk of Complications, Morbidity, and/or Mortality  Presenting problems: high  Diagnostic procedures: high  Management options: high    Critical Care  Total time providing critical care: 30-74 minutes (30)      Final diagnoses:   Septic shock (HCC)   Community acquired pneumonia, unspecified laterality   Urinary tract infection without hematuria, site unspecified   Acute kidney injury (HCC)   Dehydration   Hypernatremia   Elevated d-dimer   Elevated troponin       ED Disposition  ED Disposition     ED Disposition Condition Comment    Decision to Admit  Level of Care: Progressive Care [20]   Diagnosis: Septic shock (HCC) [[id number]]   Admitting Physician: JESSI RASCON [[id number]]   Attending Physician: JESSI RSACON [[id number]]   Certification: I Certify That Inpatient Hospital Services Are Medically Necessary For Greater Than 2 Midnights            No follow-up provider specified.       Medication List      No changes were made to your prescriptions during this visit.          Stephon Apodaca MD  10/01/21 1927

## 2021-10-01 NOTE — ED NOTES
Checked PTs BGL, and at this time it is 90. RN made aware     Didier Pastor  10/01/21 1952     H & P


Time Seen by Provider: 11/05/17 13:55


HPI/ROS: 





Chief complaint.  Sore throat and mouth sores





HPI.  10-year-old female with sore throat 1 week ago.  She had her throat 

swabbed and apparently the rapid strep was negative.  6 days later the office 

called the parents and told them that the culture was positive and the patient 

was started on amoxicillin 2 days ago.  Now patient has sores per last 1-2 days 

on tongue and gums and in her lips.  Hard to swallow and decreased oral intake 

secondary to mouth sores.  She is taking amoxicillin.  Low-grade fever.  No 

vomiting or diarrhea.  No rash.  Exposure at school and in family





ROS


Constitutional.  Fever


Eyes.  no problems with vision


ENT.  Sores in mouth


Cardiovascular.  no chest pain


Respiratory.  no shortness of breath, no cough


Abdominal.  no abdominal pain, no nausea/vomiting, no diarrhea


.  no problems urinating


MS.  no calf pain/swelling, no neck/back pain, no joint pain


Skin.  no rash


Lymph.  no swollen glands


Neuro.  no headache, no dizziness, no difficulty walking or with speech


Past Medical/Surgical History: 


Trisomy 18


Social History: 





Lives at home with parents


Physical Exam: 





General Appearance:  Alert well-developed female mild distress vital signs show 

heart rate 117 and temp 37.5degrees


Eyes: Pupils equal and round no pallor or injection.


ENT, tympanic membranes are normal.  Pharynx without injection.  Mucous 

membranes moist.  There are apthous type sores on tongue, gums and in her upper 

and lower lips.


Respiratory:  There are no retractions, lungs are clear to auscultation.


Cardiovascular: Regular rate and rhythm.


Gastrointestinal:   Abdomen is soft and nontender, no masses, bowel sounds 

normal.


Neurological:  Awake and alert, sensory and motor exams grossly normal.


Skin: Warm and dry, no rashes.  No rashes on palms or soles


Musculoskeletal:  Neck is supple nontender.


Extremities  symmetrical, full range of motion.


Psychiatric: Patient is oriented X 3, there is no agitation.


Constitutional: 





 Initial Vital Signs











Temperature (C)  37.5 C H  11/05/17 13:53


 


Heart Rate  117   11/05/17 13:53


 


Respiratory Rate  18   11/05/17 13:53


 


Blood Pressure  123/81 H  11/05/17 13:53


 


O2 Sat (%)  96   11/05/17 13:53








 











O2 Delivery Mode               Room Air














Allergies/Adverse Reactions: 


 





No Known Allergies Allergy (Verified 08/25/16 15:20)


 








Home Medications: 














 Medication  Instructions  Recorded


 


Amoxicillin [Amoxicillin Susp] 6.25 ml PO BID 10 Days  ml 01/18/17


 


Acyclovir 500 mg PO Q3HRS WHILE AWAKE 7 Days 11/05/17





 #500 ml 














Medical Decision Making


ED Course/Re-evaluation: 





On re-evaluation patient is stable.  Dad and I discussed treatment plan 

including criteria for return and importance of follow-up further evaluation.  

They expressed understanding and agreement


Differential Diagnosis: 





It is a little unclear the positive strep.  They were not contacted for 6 days 

and the rapid strep screen was negative.  Sores are consistent with primary 

herpes stomatitis infection.  Oral herpes is apparently present in the family.  

I have concern for dehydration however patient does appear currently to be well 

hydrated and not toxic appearing.





Departure





- Departure


Disposition: Home, Routine, Self-Care


Clinical Impression: 


 Herpes stomatitis





Condition: Good


Instructions:  Gingivostomatitis in Children (ED)


Additional Instructions: 


Manic mouthwash using 5 ml ( 1 teaspoon) three times daily before trying to 

eat.  Tylenol 500 mg every 4-6 hours, ibuprofen 400 mg every 6 hours for pain 

and fever.  Smoothies and popsicles and fluids to stay hydrated.  Acyclovir 5 

times daily for the next week to help with mouth sores.





May continue the amoxicillin for strep throat.





Return for worsening symptoms.  Recheck in 1-2 days without fail


Referrals: 


MARIA D LOYOLA [Other] - 1-2 days without fail


Prescriptions: 


Acyclovir 500 mg PO Q3HRS WHILE AWAKE 7 Days #500 ml

## 2021-10-01 NOTE — ED NOTES
Verbal consent for treatment given by Emilie Soto, patient's guardian.      Rocio Quigley, RN  10/01/21 3525

## 2021-10-02 NOTE — NURSING NOTE
Patients sister, Teresa, called for an update at this time. Teresa stated that the patient is a resident at [facility] but that she is unable to provide any information regarding the patient due to her not seeing the patient for a significant amount of time.       ROB Edmonds

## 2021-10-02 NOTE — CONSULTS
Nephrology Consult Note    Referring Provider: Dr. Goldberg  Reason for Consultation: Hypernatremia, TAYLER, metabolic acidosis    Subjective       History of present illness:  Lolis Infante is a 89 y.o. female who presented to [facility] emergency department with chief complaint of low oxygen saturation. Pt is known to have, dementia, cognitive communication deficit, grade 1 diastolic CHF with cardiomegaly, HTN. Nephrology was consulted for further evaluation.  Pt has baseline CKD and recently had Cr <1, was admitted with Cr 2.6 and sodium was 179. I started the patient on D5W with parameters to stop when sodium <169, she received 2L of normal saline bolus in ED prior to that. She had profoundly low BP and was started on pressors and remains on pressers support   Pt is awake but  Not communicative and I was unable  To obtain history.       History  Past Medical History:   Diagnosis Date   • Advanced age    • Cardiomegaly    • Chronic kidney disease (CKD), stage III (moderate) (CMS/HCC)    • Cognitive communication deficit    • Dementia (CMS/HCC)    • Dysphagia    • Gait instability    • Grade I diastolic dysfunction    • Hypertension    • Malnutrition (CMS/HCC)    , No past surgical history on file.,   Family History   Family history unknown: Yes   ,   Social History     Tobacco Use   • Smoking status: Never Smoker   Substance Use Topics   • Alcohol use: No   • Drug use: No   ,   Medications Prior to Admission   Medication Sig Dispense Refill Last Dose   • amLODIPine (NORVASC) 5 MG tablet Take 5 mg by mouth Daily.   10/1/2021 at Unknown time   • apixaban (ELIQUIS) 2.5 MG tablet tablet Take 2.5 mg by mouth Daily.   10/1/2021 at Unknown time   • ascorbic acid (VITAMIN C) 500 MG tablet Take 500 mg by mouth Daily.   10/1/2021 at Unknown time   • aspirin 81 MG chewable tablet Chew 81 mg Daily.   10/1/2021 at Unknown time   • busPIRone (BUSPAR) 5 MG tablet Take 1 tablet by mouth Every 12 (Twelve) Hours. 60 tablet 0  10/1/2021 at 0800   • donepezil (ARICEPT) 5 MG tablet Take 1 tablet by mouth Every Night. 30 tablet 0 9/30/2021 at Unknown time   • famotidine (Pepcid) 20 MG tablet Take 1 tablet by mouth Daily. 30 tablet 0 10/1/2021 at Unknown time   • megestrol (MEGACE) 40 MG/ML suspension Take 200 mg by mouth 2 (Two) Times a Day.   10/1/2021 at 0800   • melatonin 5 MG tablet tablet Take 1 tablet by mouth Every Night.   9/30/2021 at Unknown time   • rosuvastatin (CRESTOR) 10 MG tablet Take 10 mg by mouth Every Night.   9/30/2021 at Unknown time   • vitamin D3 (Dialyvite Vitamin D 5000) 125 MCG (5000 UT) capsule capsule Take 5,000 Units by mouth Daily.   10/1/2021 at Unknown time   • zinc sulfate (ZINCATE) 220 (50 Zn) MG capsule Take 220 mg by mouth 2 (Two) Times a Day.   10/1/2021 at 0800   • acetaminophen (TYLENOL) 500 MG tablet Take 500 mg by mouth Every 6 (Six) Hours As Needed for Mild Pain .   Unknown at Unknown time   • bisacodyl (DULCOLAX) 10 MG suppository Insert 10 mg into the rectum Daily As Needed for Constipation.   Unknown at Unknown time   • senna (Senna-Lax) 8.6 MG tablet Take 1 tablet by mouth Daily As Needed for Constipation.   Unknown at Unknown time   , Scheduled Meds:  cefepime, 2 g, Intravenous, Q24H  famotidine, 20 mg, Intravenous, Daily  metroNIDAZOLE, 500 mg, Intravenous, Q8H  sodium chloride, 10 mL, Intravenous, Q12H  sodium chloride, 10 mL, Intravenous, Q12H  Vancomycin Pharmacy Intermittent Dosing, , Does not apply, Daily    , Continuous Infusions:  dextrose, 125 mL/hr, Last Rate: Stopped (10/02/21 0641)  heparin (porcine), 12 Units/kg/hr, Last Rate: 1.78 Units/kg/hr (10/02/21 0726)  norepinephrine, 0.02-0.3 mcg/kg/min, Last Rate: 0.3 mcg/kg/min (10/02/21 2648)  phenylephrine, 0.5-3 mcg/kg/min, Last Rate: 0.8 mcg/kg/min (10/02/21 0615)    , PRN Meds:  •  aspirin  •  heparin (porcine)  •  heparin (porcine)  •  [COMPLETED] Insert peripheral IV **AND** sodium chloride  •  sodium chloride  •  sodium chloride  and Allergies:  Patient has no known allergies.    Review of Systems  Unable to obtain    Objective     Vital Signs  Temp:  [94.6 °F (34.8 °C)-99 °F (37.2 °C)] 97.1 °F (36.2 °C)  Heart Rate:  [] 99  Resp:  [21-33] 28  BP: ()/(33-97) 119/82    No intake/output data recorded.  I/O last 3 completed shifts:  In: 3428.6 [I.V.:1278.6; IV Piggyback:2150]  Out: -     Physical Examination:  General Appearance: not in acute distress  Head: Normocephalic, without obvious abnormality and atraumatic  Throat: Oral mucosa moist  Neck: No adenopathy, suppple, no carotid bruit and no JVD  Lungs: Clear to auscultation, respirations regular and unlabored  Heart: Regular rhythm & normal rate, normal S1, S2, no murmur, no gallop, no rub   Abdomen: Normal bowel sounds, no masses and soft non-tender  Pulses: Palpable and equal bilaterally  Skin: No rash  Neurologic: unable to assess.     Laboratory Data :      WBC WBC   Date Value Ref Range Status   10/02/2021 6.36 3.40 - 10.80 10*3/mm3 Final   10/01/2021 4.04 3.40 - 10.80 10*3/mm3 Final   10/01/2021 1.67 (C) 3.40 - 10.80 10*3/mm3 Final      HGB Hemoglobin   Date Value Ref Range Status   10/02/2021 12.7 12.0 - 15.9 g/dL Final   10/01/2021 13.4 12.0 - 15.9 g/dL Final   10/01/2021 14.9 12.0 - 15.9 g/dL Final      HCT Hematocrit   Date Value Ref Range Status   10/02/2021 44.2 34.0 - 46.6 % Final   10/01/2021 46.3 34.0 - 46.6 % Final   10/01/2021 51.8 (H) 34.0 - 46.6 % Final      Platlets No results found for: LABPLAT   MCV MCV   Date Value Ref Range Status   10/02/2021 108.9 (H) 79.0 - 97.0 fL Final   10/01/2021 107.7 (H) 79.0 - 97.0 fL Final   10/01/2021 107.7 (H) 79.0 - 97.0 fL Final          Sodium Sodium   Date Value Ref Range Status   10/02/2021 169 (C) 136 - 145 mmol/L Final   10/02/2021 175 (C) 136 - 145 mmol/L Final   10/01/2021 179 (C) 136 - 145 mmol/L Final   10/01/2021 179 (C) 136 - 145 mmol/L Final     Comment:     Verified by Repeat Analysis       Potassium Potassium    Date Value Ref Range Status   10/02/2021 4.2 3.5 - 5.2 mmol/L Final   10/02/2021 4.3 3.5 - 5.2 mmol/L Final   10/01/2021 3.8 3.5 - 5.2 mmol/L Final   10/01/2021 3.9 3.5 - 5.2 mmol/L Final      Chloride Chloride   Date Value Ref Range Status   10/02/2021 139 (H) 98 - 107 mmol/L Final   10/02/2021 >140 (H) 98 - 107 mmol/L Final   10/01/2021 >140 (H) 98 - 107 mmol/L Final   10/01/2021 >140 (H) 98 - 107 mmol/L Final     Comment:     Verified by Repeat Analysis       CO2 CO2   Date Value Ref Range Status   10/02/2021 11.3 (L) 22.0 - 29.0 mmol/L Final   10/02/2021 13.4 (L) 22.0 - 29.0 mmol/L Final   10/01/2021 13.7 (L) 22.0 - 29.0 mmol/L Final   10/01/2021 14.2 (L) 22.0 - 29.0 mmol/L Final      BUN BUN   Date Value Ref Range Status   10/02/2021 90 (H) 8 - 23 mg/dL Final   10/02/2021 92 (H) 8 - 23 mg/dL Final   10/01/2021 93 (H) 8 - 23 mg/dL Final   10/01/2021 98 (H) 8 - 23 mg/dL Final      Creatinine Creatinine   Date Value Ref Range Status   10/02/2021 2.64 (H) 0.57 - 1.00 mg/dL Final   10/02/2021 2.89 (H) 0.57 - 1.00 mg/dL Final   10/01/2021 2.94 (H) 0.57 - 1.00 mg/dL Final   10/01/2021 2.98 (H) 0.57 - 1.00 mg/dL Final      Calcium Calcium   Date Value Ref Range Status   10/02/2021 7.3 (L) 8.2 - 9.6 mg/dL Final   10/02/2021 7.3 (L) 8.2 - 9.6 mg/dL Final   10/01/2021 7.5 (L) 8.2 - 9.6 mg/dL Final   10/01/2021 8.4 8.2 - 9.6 mg/dL Final      PO4 No results found for: CAPO4   Albumin Albumin   Date Value Ref Range Status   10/02/2021 2.48 (L) 3.50 - 5.20 g/dL Final   10/01/2021 2.89 (L) 3.50 - 5.20 g/dL Final      Magnesium Magnesium   Date Value Ref Range Status   10/01/2021 2.6 (H) 1.7 - 2.3 mg/dL Final      Uric Acid No results found for: URICACID     Radiology results :     Imaging Results (Last 72 Hours)     Procedure Component Value Units Date/Time    XR Foot 2 View Left [[id number]] Collected: 10/01/21 2030     Updated: 10/01/21 2034    Narrative:      EXAMINATION: XR FOOT 2 VW LEFT-      CLINICAL INDICATION: left  1st toe ulcer, concern for underlying osteo;  A41.9-Sepsis, unspecified organism; R65.21-Severe sepsis with septic  shock; J18.9-Pneumonia, unspecified organism; N39.0-Urinary tract  infection, site not specified; N17.9-Acute kidney failure, unspecified;  E86.0-Dehydration; E87.0-Hyperosmolality and hypernatremia; R79.89-Other  specified abnormal findings of blood chemistry; R77.8-Other specific        COMPARISON: None available     FINDINGS:  2 views of the left foot     There is diffuse demineralization, no fracture seen     No focal erosion is identified on the presented study       Impression:      1. Diffuse demineralization. No acute fracture. Soft tissue injury/ulcer  at the interphalangeal joint of the first digit. No definitive erosive  change.      This report was finalized on 10/1/2021 8:32 PM by Dr. Sanjeev Calero MD.       XR Chest AP [[id number]] Collected: 10/01/21 1933     Updated: 10/01/21 1935    Narrative:      XR CHEST AP-     CLINICAL INDICATION: central line; A41.9-Sepsis, unspecified organism;  R65.21-Severe sepsis with septic shock; J18.9-Pneumonia, unspecified  organism; N39.0-Urinary tract infection, site not specified; N17.9-Acute  kidney failure, unspecified; E86.0-Dehydration; E87.0-Hyperosmolality  and hypernatremia; R79.89-Other specified abnormal findings of blood  chemistry; R77.8-Other specified abnormalities of plasma proteins        COMPARISON: 10/01/2021      TECHNIQUE: Single frontal view of the chest.     FINDINGS:     Right-sided central line tip in superior vena cava. No pneumothorax  The cardiac silhouette is normal. The pulmonary vasculature is  unremarkable.  There is no evidence of an acute osseous abnormality.   There are no suspicious-appearing parenchymal soft tissue nodules.          Impression:         1. Central line as above  2. Bibasilar airspace disease     This report was finalized on 10/1/2021 7:33 PM by Dr. Sanjeev Calero MD.       NM Lung Scan Perfusion  Particulate [[id number]] Collected: 10/01/21 1845     Updated: 10/01/21 1848    Narrative:      EXAMINATION: NM LUNG SCAN PERFUSION PARTICULATE-      CLINICAL INDICATION: Elevated D-dimer        COMPARISON: Chest x-ray 10/01/2021     PROCEDURE:  4.1 mCi technetium MAA was administered.  Perfusion images were then acquired.     FINDINGS:  Fairly homogeneous distribution of the radiotracer. No segmental defects  are seen.       Impression:      Based on the perfusion alone, appearance most suggestive of  low likelihood of pulmonary embolus         This report was finalized on 10/1/2021 6:46 PM by Dr. Sanjeev Calero MD.       XR Chest 1 View [[id number]] Collected: 10/01/21 1451     Updated: 10/01/21 1453    Narrative:      XR CHEST 1 VW-     CLINICAL INDICATION: Respiratory distress        COMPARISON: 07/24/2020      TECHNIQUE: Single frontal view of the chest.     FINDINGS:     Bilateral airspace disease  The cardiac silhouette is normal. The pulmonary vasculature is  unremarkable.  There is no evidence of an acute osseous abnormality.   There are no suspicious-appearing parenchymal soft tissue nodules.          Impression:      Bilateral airspace disease     This report was finalized on 10/1/2021 2:51 PM by Dr. Sanjeev Calero MD.               Medications:      cefepime, 2 g, Intravenous, Q24H  famotidine, 20 mg, Intravenous, Daily  metroNIDAZOLE, 500 mg, Intravenous, Q8H  sodium chloride, 10 mL, Intravenous, Q12H  sodium chloride, 10 mL, Intravenous, Q12H  Vancomycin Pharmacy Intermittent Dosing, , Does not apply, Daily      dextrose, 125 mL/hr, Last Rate: Stopped (10/02/21 0641)  heparin (porcine), 12 Units/kg/hr, Last Rate: 1.78 Units/kg/hr (10/02/21 0726)  norepinephrine, 0.02-0.3 mcg/kg/min, Last Rate: 0.3 mcg/kg/min (10/02/21 0535)  phenylephrine, 0.5-3 mcg/kg/min, Last Rate: 0.8 mcg/kg/min (10/02/21 0615)        Assessment/Plan       Septic shock (HCC)    1. Hypernatremia   2. Tripple disorder, Anion gap  metabolic acidosis, non anion gap metabolic acidosis, respiratory acidosis  3. TAYLER on CKD  4. Sever sepsis  5. Septic shock  6. Demential     Admission sodium was 179, have reduced to 169, significant acidosis that is multifactorial, will DC D5W and start on 1/2NS with 75meq/L of sodium bicarb at  75ml/h starting from 2pm, I ordered 75meq bicarb push this morning and continue sodium check q4h, I have set  Parameters for appropriate sodium goal;  if sodium remains between 165-159, 1/2 NS with 75meq/L of sod bicarb at 75ml/h  If sodium >165, start on D5W at 100ml/h and DC if sodium <165  If sodium <159, DC all fluids  If sodium <155, call on call nephrology    TAYLER likely due to pre renal/early ATN  Cr on admission was 2.9, improving tto 2.6  Requested renal USG.   Titrate pressors to keep MAP >65 mmHg  Thanks Dr Goldberg for the consult. Nephrology will follow the patient.   I discussed the patient's findings and my recommendations with patient and consulting provider     >45min CC time spent    Matilda Belle MD  10/02/21  07:46 EDT

## 2021-10-02 NOTE — PROGRESS NOTES
[facility] HOSPITALIST PROGRESS NOTE     Patient Identification:  Name:  Lolis Infante  Age:  89 y.o.  Sex:  female  :  1929  MRN:  [id number]  Visit Number:  [id number]  ROOM: [address]     Primary Care Provider:  Cristal Linn MD    Length of stay in inpatient status:  1    Subjective     Chief Compliant:    Chief Complaint   Patient presents with   • Respiratory Distress       History of Presenting Illness: 89-year-old female who was admitted yesterday evening with severe hypernatremia, acute kidney injury, acute hypoxic respiratory failure, bibasilar pneumonia.  Patient with severe sepsis and is requiring pressors at this time.  Patient is DO NOT INTUBATE, DNR.  Patient unable to give any history at this time    Objective     Current Hospital Meds:cefepime, 2 g, Intravenous, Q24H  famotidine, 20 mg, Intravenous, Daily  metroNIDAZOLE, 500 mg, Intravenous, Q8H  sodium chloride, 10 mL, Intravenous, Q12H  sodium chloride, 10 mL, Intravenous, Q12H  Vancomycin Pharmacy Intermittent Dosing, , Does not apply, Daily    dextrose, 125 mL/hr, Last Rate: Stopped (10/02/21 0641)  heparin (porcine), 12 Units/kg/hr, Last Rate: 1.78 Units/kg/hr (10/02/21 0726)  norepinephrine, 0.02-0.3 mcg/kg/min, Last Rate: 0.3 mcg/kg/min (10/02/21 0535)  phenylephrine, 0.5-3 mcg/kg/min, Last Rate: 0.8 mcg/kg/min (10/02/21 0615)      ----------------------------------------------------------------------------------------------------------------------  Vital Signs:  Temp:  [94.6 °F (34.8 °C)-99 °F (37.2 °C)] 97.1 °F (36.2 °C)  Heart Rate:  [] 99  Resp:  [21-33] 28  BP: ()/(33-97) 119/82  SpO2:  [87 %-100 %] 95 %  on  Flow (L/min):  [6-50] 50;   Device (Oxygen Therapy): heated;high-flow nasal cannula  Body mass index is 16.28 kg/m².    Wt Readings from Last 3 Encounters:   10/01/21 37.8 kg (83 lb 6 oz)   20 42.5 kg (93 lb 11.2 oz)   19 40.8 kg (89 lb 15.2 oz)     Intake & Output (last 3 days)        09/29 0701 - 09/30 0700 09/30 0701 - 10/01 0700 10/01 0701 - 10/02 0700 10/02 0701 - 10/03 0700    P.O.   0     I.V. (mL/kg)   1278.6 (33.8)     IV Piggyback   2150     Total Intake(mL/kg)   3428.6 (90.7)     Net   +3428.6             Urine Unmeasured Occurrence   3 x     Stool Unmeasured Occurrence   2 x         NPO Diet  ----------------------------------------------------------------------------------------------------------------------  Physical exam:  Constitutional:   Frail 89-year-old female who is arousable but nonverbal  HEENT: Normocephalic atraumatic  Neck:    Supple  Cardiovascular: Irregular irregular, ventricular rate controlled  Pulmonary/Chest: Fairly clear to auscultation.  Abdominal: Positive bowel sounds soft.   Musculoskeletal: No arthropathy  Neurological: Patient with clouded sensorium.  Unable to follow commands.  Skin: No rash  Peripheral vascular:  Genitourinary:  ----------------------------------------------------------------------------------------------------------------------    Last echocardiogram:  Results for orders placed during the hospital encounter of 08/03/19    Adult Transthoracic Echo Complete W/ Cont if Necessary Per Protocol    Interpretation Summary  · Normal left ventricular cavity size and wall thickness noted. All left ventricular wall segments contract normally.  · Estimated EF appears to be in the range of 61 - 65%  · Left ventricular diastolic dysfunction (grade I) consistent with impaired relaxation.  · The aortic valve is abnormal in structure. The valve exhibits sclerosis. Mild to moderate aortic valve regurgitation is present. No aortic valve stenosis is present.  · The mitral valve is normal in structure. Trace mitral valve regurgitation is present. No significant mitral valve stenosis is present.  · The tricuspid valve is normal. No evidence of tricuspid valve stenosis is present. Mild tricuspid valve regurgitation is present. Estimated right ventricular  systolic pressure from tricuspid regurgitation is mildly elevated (35-45 mmHg).  · There is no evidence of pericardial effusion.    ----------------------------------------------------------------------------------------------------------------------  Results from last 7 days   Lab Units 10/02/21  0059 10/01/21  2132 10/01/21  1720 10/01/21  1400   CRP mg/dL  --   --   --  2.04*   LACTATE mmol/L 6.3*  --  5.1* 5.7*   WBC 10*3/mm3 6.36 4.04  --  1.67*   HEMOGLOBIN g/dL 12.7 13.4  --  14.9   HEMATOCRIT % 44.2 46.3  --  51.8*   MCV fL 108.9* 107.7*  --  107.7*   MCHC g/dL 28.7* 28.9*  --  28.8*   PLATELETS 10*3/mm3 154 156  --  147   INR   --  1.36*  --   --      Results from last 7 days   Lab Units 10/02/21  0455   PH, ARTERIAL pH units 7.186*   PO2 ART mm Hg 55.8*   PCO2, ARTERIAL mm Hg 31.8*   HCO3 ART mmol/L 12.0*     Results from last 7 days   Lab Units 10/02/21  0515 10/02/21  0059 10/01/21  2132 10/01/21  1400 10/01/21  1400   SODIUM mmol/L 169* 175* 179*   < > 179*   POTASSIUM mmol/L 4.2 4.3 3.8   < > 3.9   MAGNESIUM mg/dL  --   --  2.6*  --   --    CHLORIDE mmol/L 139* >140* >140*   < > >140*   CO2 mmol/L 11.3* 13.4* 13.7*   < > 14.2*   BUN mg/dL 90* 92* 93*   < > 98*   CREATININE mg/dL 2.64* 2.89* 2.94*   < > 2.98*   EGFR IF NONAFRICN AM mL/min/1.73 17* 15* 15*   < > 15*   CALCIUM mg/dL 7.3* 7.3* 7.5*   < > 8.4   GLUCOSE mg/dL 277* 180* 110*   < > 79   ALBUMIN g/dL  --  2.48*  --   --  2.89*   BILIRUBIN mg/dL  --  0.5  --   --  0.6   ALK PHOS U/L  --  70  --   --  93   AST (SGOT) U/L  --  36*  --   --  32   ALT (SGPT) U/L  --  17  --   --  18    < > = values in this interval not displayed.   Estimated Creatinine Clearance: 8.1 mL/min (A) (by C-G formula based on SCr of 2.64 mg/dL (H)).  No results found for: AMMONIA  Results from last 7 days   Lab Units 10/02/21  0059 10/01/21  2132 10/01/21  1400   TROPONIN T ng/mL 0.222* 0.229* 0.216*     Results from last 7 days   Lab Units 10/01/21  1400   PROBNP pg/mL  4,279.0*         Glucose   Date/Time Value Ref Range Status   10/02/2021 0540 205 (H) 70 - 130 mg/dL Final     Comment:     Meter: [id number] : [id number] jose g cano   10/02/2021 0324 202 (H) 70 - 130 mg/dL Final     Comment:     Meter: [id number] : [id number] jose g cano   10/02/2021 0002 99 70 - 130 mg/dL Final     Comment:     Meter: [id number] : [id number] jose g caon   10/01/2021 2132 91 70 - 130 mg/dL Final     Comment:     Meter: [id number] : [id number] jose g cano   10/01/2021 1951 90 70 - 130 mg/dL Final     Comment:     Meter: [id number] : [id number] Didier Darby   10/01/2021 1456 67 (L) 70 - 130 mg/dL Final     Comment:     Meter: [id number] : [id number] Didierdianelys Pastor     Lab Results   Component Value Date    TSH 1.030 07/21/2020     No results found for: PREGTESTUR, PREGSERUM, HCG, HCGQUANT  Pain Management Panel    There is no flowsheet data to display.       Brief Urine Lab Results  (Last result in the past 365 days)      Color   Clarity   Blood   Leuk Est   Nitrite   Protein   CREAT   Urine HCG        10/01/21 1554 Yellow Turbid Moderate (2+) Moderate (2+) Negative 30 mg/dL (1+)             No results found for: BLOODCX  Results from last 7 days   Lab Units 10/01/21  1554   NITRITE UA  Negative   WBC UA /HPF 13-20*   BACTERIA UA /HPF 1+*   SQUAM EPITHEL UA /HPF 7-12*     No results found for: URINECX  No results found for: WOUNDCX  No results found for: STOOLCX  Results from last 7 days   Lab Units 10/02/21  0059 10/01/21  1720 10/01/21  1400   LACTATE mmol/L 6.3* 5.1* 5.7*   CRP mg/dL  --   --  2.04*       I have personally looked at the labs and they are summarized above.  ----------------------------------------------------------------------------------------------------------------------  Detailed radiology reports for the last 24 hours:    Imaging Results (Last 24 Hours)     Procedure Component Value Units Date/Time    XR Foot 2 View Left [[id number]] Collected: 10/01/21  2030     Updated: 10/01/21 2034    Narrative:      EXAMINATION: XR FOOT 2 VW LEFT-      CLINICAL INDICATION: left 1st toe ulcer, concern for underlying osteo;  A41.9-Sepsis, unspecified organism; R65.21-Severe sepsis with septic  shock; J18.9-Pneumonia, unspecified organism; N39.0-Urinary tract  infection, site not specified; N17.9-Acute kidney failure, unspecified;  E86.0-Dehydration; E87.0-Hyperosmolality and hypernatremia; R79.89-Other  specified abnormal findings of blood chemistry; R77.8-Other specific        COMPARISON: None available     FINDINGS:  2 views of the left foot     There is diffuse demineralization, no fracture seen     No focal erosion is identified on the presented study       Impression:      1. Diffuse demineralization. No acute fracture. Soft tissue injury/ulcer  at the interphalangeal joint of the first digit. No definitive erosive  change.      This report was finalized on 10/1/2021 8:32 PM by Dr. Sanjeev Calero MD.       XR Chest AP [[id number]] Collected: 10/01/21 1933     Updated: 10/01/21 1935    Narrative:      XR CHEST AP-     CLINICAL INDICATION: central line; A41.9-Sepsis, unspecified organism;  R65.21-Severe sepsis with septic shock; J18.9-Pneumonia, unspecified  organism; N39.0-Urinary tract infection, site not specified; N17.9-Acute  kidney failure, unspecified; E86.0-Dehydration; E87.0-Hyperosmolality  and hypernatremia; R79.89-Other specified abnormal findings of blood  chemistry; R77.8-Other specified abnormalities of plasma proteins        COMPARISON: 10/01/2021      TECHNIQUE: Single frontal view of the chest.     FINDINGS:     Right-sided central line tip in superior vena cava. No pneumothorax  The cardiac silhouette is normal. The pulmonary vasculature is  unremarkable.  There is no evidence of an acute osseous abnormality.   There are no suspicious-appearing parenchymal soft tissue nodules.          Impression:         1. Central line as above  2. Bibasilar airspace  disease     This report was finalized on 10/1/2021 7:33 PM by Dr. Sanjeev Calero MD.       NM Lung Scan Perfusion Particulate [[id number]] Collected: 10/01/21 1845     Updated: 10/01/21 1848    Narrative:      EXAMINATION: NM LUNG SCAN PERFUSION PARTICULATE-      CLINICAL INDICATION: Elevated D-dimer        COMPARISON: Chest x-ray 10/01/2021     PROCEDURE:  4.1 mCi technetium MAA was administered.  Perfusion images were then acquired.     FINDINGS:  Fairly homogeneous distribution of the radiotracer. No segmental defects  are seen.       Impression:      Based on the perfusion alone, appearance most suggestive of  low likelihood of pulmonary embolus         This report was finalized on 10/1/2021 6:46 PM by Dr. Sanjeev Calero MD.       XR Chest 1 View [[id number]] Collected: 10/01/21 1451     Updated: 10/01/21 1453    Narrative:      XR CHEST 1 VW-     CLINICAL INDICATION: Respiratory distress        COMPARISON: 07/24/2020      TECHNIQUE: Single frontal view of the chest.     FINDINGS:     Bilateral airspace disease  The cardiac silhouette is normal. The pulmonary vasculature is  unremarkable.  There is no evidence of an acute osseous abnormality.   There are no suspicious-appearing parenchymal soft tissue nodules.          Impression:      Bilateral airspace disease     This report was finalized on 10/1/2021 2:51 PM by Dr. Sanjeev Calero MD.           Final impressions for the last 30 days of radiology reports:    XR Foot 2 View Left    Result Date: 10/1/2021  1. Diffuse demineralization. No acute fracture. Soft tissue injury/ulcer at the interphalangeal joint of the first digit. No definitive erosive change.  This report was finalized on 10/1/2021 8:32 PM by Dr. Sanjeev Calero MD.      NM Lung Scan Perfusion Particulate    Result Date: 10/1/2021  Based on the perfusion alone, appearance most suggestive of low likelihood of pulmonary embolus   This report was finalized on 10/1/2021 6:46 PM by Dr. Sanjeev Calero MD.      XR  Chest 1 View    Result Date: 10/1/2021  Bilateral airspace disease  This report was finalized on 10/1/2021 2:51 PM by Dr. Sanjeev Calero MD.      XR Chest AP    Result Date: 10/1/2021   1. Central line as above 2. Bibasilar airspace disease  This report was finalized on 10/1/2021 7:33 PM by Dr. Sanjeev Calero MD.      I have personally looked at the radiology images and read the final radiology report.    Assessment & Plan    Septic shock--patient currently requiring Levophed and phenylephrine.  Likely secondary to pneumonia.  Patient currently on broad-spectrum antibiotics including cefepime, Flagyl, vancomycin.  Patient is DNR.  Patient has poor prognosis    Severe hypernatremia--patient getting D5 water at this time at 125 cc/h.  Checking BMP every 4 hours.  Sodium level was 179 the time of admission is 169 this morning.  Appreciate nephrology input.    Acute kidney injury--likely secondary to severe volume depletion.  Will continue treatment at this time    Mixed metabolic/respiratory acidosis--patient did receive a dose of bicarb earlier.  Patient's metabolic acidosis likely secondary to severe volume depletion and acute kidney injury.  Patient does have markedly elevated lactic acid level as well.    Dementia--supportive care    Troponinemia--likely secondary to acute kidney injury.  I cannot rule out the possibility of type II non-STEMI.  Echocardiogram pending.  Patient not a candidate for interventional care at this time.  Patient is on heparin protocol    Markedly elevated D-dimer--heparin protocol.  VQ scan showed low probability of PE.  Could obtain venous Doppler ultrasounds of lower extremities today    Acute metabolic encephalopathy superimposed on dementia secondary to severe sepsis.  Secondary to hypernatremia    Left first toe medial ulcer--local care and IV antibiotics at this time.  VTE Prophylaxis:   Mechanical Order History:     None      Pharmalogical Order History:      Ordered     Dose Route  Frequency Stop    10/01/21 2030  heparin (porcine) 5000 UNIT/ML injection 5,000 Units  Status:  Discontinued      5,000 Units SC Every 12 Hours Scheduled 10/01/21 2042    10/01/21 2042  heparin (porcine) 5000 UNIT/ML injection 2,400 Units      60 Units/kg IV Once 10/01/21 2241    10/01/21 2042  heparin 98536 units/250 mL (100 units/mL) in 0.45 % NaCl infusion  4.78 mL/hr      12 Units/kg/hr IV Titrated --    10/01/21 2042  heparin (porcine) 5000 UNIT/ML injection 2,400 Units      60 Units/kg IV As Needed --    10/01/21 2042  heparin (porcine) 5000 UNIT/ML injection 1,200 Units      30 Units/kg IV As Needed --                The patient is high risk due to the following diagnoses/reasons: Septic shock; severe hypernatremia; acute kidney injury with metabolic acidosis  Dipso:   Seth Vargas MD  [facility]  10/02/21  07:57 EDT

## 2021-10-02 NOTE — CONSULTS
CONSULT NOTE    Patient Identification:  Lolis Infante  89 y.o.  female  6/26/1929  [id number]            Requesting physician: Hospitalist    Reason for Consultation: Pneumonia and sepsis    CC:     History of Present Illness:  89-year-old female nursing home resident multiple medical problems including CHF dementia cognitive communication deficit has been progressively getting worse with her roommate reportedly having COVID-19 infection.  She was noted to be tachypneic in the emergency room and hypotensive.  She is currently requiring Levophed with Lenin-Synephrine to maintain her blood pressure.  She is currently on heated high flow at 95% with 60 L.  Patient unable to give me any meaningful history.  Chart extensively reviewed and noted possible UTI and bibasilar pneumonia.      Review of Systems  Unable to get with patient's current condition  Past Medical History:  Past Medical History:   Diagnosis Date   • Advanced age    • Cardiomegaly    • Chronic kidney disease (CKD), stage III (moderate) (CMS/HCC)    • Cognitive communication deficit    • Dementia (CMS/HCC)    • Dysphagia    • Gait instability    • Grade I diastolic dysfunction    • Hypertension    • Malnutrition (CMS/HCC)        Past Surgical History:  No past surgical history on file.     Home Meds:  Medications Prior to Admission   Medication Sig Dispense Refill Last Dose   • amLODIPine (NORVASC) 5 MG tablet Take 5 mg by mouth Daily.   10/1/2021 at Unknown time   • apixaban (ELIQUIS) 2.5 MG tablet tablet Take 2.5 mg by mouth Daily.   10/1/2021 at Unknown time   • ascorbic acid (VITAMIN C) 500 MG tablet Take 500 mg by mouth Daily.   10/1/2021 at Unknown time   • aspirin 81 MG chewable tablet Chew 81 mg Daily.   10/1/2021 at Unknown time   • busPIRone (BUSPAR) 5 MG tablet Take 1 tablet by mouth Every 12 (Twelve) Hours. 60 tablet 0 10/1/2021 at 0800   • donepezil (ARICEPT) 5 MG tablet Take 1 tablet by mouth Every Night. 30 tablet 0 9/30/2021 at Unknown time  "  • famotidine (Pepcid) 20 MG tablet Take 1 tablet by mouth Daily. 30 tablet 0 10/1/2021 at Unknown time   • megestrol (MEGACE) 40 MG/ML suspension Take 200 mg by mouth 2 (Two) Times a Day.   10/1/2021 at 0800   • melatonin 5 MG tablet tablet Take 1 tablet by mouth Every Night.   9/30/2021 at Unknown time   • rosuvastatin (CRESTOR) 10 MG tablet Take 10 mg by mouth Every Night.   9/30/2021 at Unknown time   • vitamin D3 (Dialyvite Vitamin D 5000) 125 MCG (5000 UT) capsule capsule Take 5,000 Units by mouth Daily.   10/1/2021 at Unknown time   • zinc sulfate (ZINCATE) 220 (50 Zn) MG capsule Take 220 mg by mouth 2 (Two) Times a Day.   10/1/2021 at 0800   • acetaminophen (TYLENOL) 500 MG tablet Take 500 mg by mouth Every 6 (Six) Hours As Needed for Mild Pain .   Unknown at Unknown time   • bisacodyl (DULCOLAX) 10 MG suppository Insert 10 mg into the rectum Daily As Needed for Constipation.   Unknown at Unknown time   • senna (Senna-Lax) 8.6 MG tablet Take 1 tablet by mouth Daily As Needed for Constipation.   Unknown at Unknown time       Allergies:  No Known Allergies    Social History:   Social History     Socioeconomic History   • Marital status:      Spouse name: Not on file   • Number of children: Not on file   • Years of education: Not on file   • Highest education level: Not on file   Tobacco Use   • Smoking status: Never Smoker   Substance and Sexual Activity   • Alcohol use: No   • Drug use: No   • Sexual activity: Defer       Family History:  Family History   Family history unknown: Yes       Physical Exam:  /77   Pulse 88   Temp 97.1 °F (36.2 °C) (Axillary)   Resp 28   Ht 152.4 cm (60\")   Wt 37.8 kg (83 lb 6 oz)   SpO2 96%   BMI 16.28 kg/m²  Body mass index is 16.28 kg/m². 96% 37.8 kg (83 lb 6 oz)  Physical Exam  Patient is examined using the personal protective equipment as per guidelines from infection control for this particular patient as enacted.  Hand hygiene was performed before and " after patient interaction.  Confused lethargic on heated high flow  Not following commands for me at this time  Eyes normal conjunctivae and pupils reactive to light  ENT Mallampati between 3 and 4 normal nasal exam  Neck midline trachea no thyromegaly  Chest diminished breath sounds occasional crackles and tachypneic at times  CVS regular rate and rhythm no lower extremity edema  Abdomen soft nontender no hepatosplenomegaly  CNS confused not following commands  Skin no rashes no nodules  Psych confused not following commands  Musculoskeletal no cyanosis no clubbing normal range of motion        LABS:  Lab Results   Component Value Date    CALCIUM 7.0 (L) 10/02/2021    PHOS 2.8 07/23/2020     Results from last 7 days   Lab Units 10/02/21  1212 10/02/21  1011 10/02/21  1011 10/02/21  0515 10/02/21  0515 10/02/21  0059 10/02/21  0059 10/01/21  2132 10/01/21  2132 10/01/21  1400 10/01/21  1400   MAGNESIUM mg/dL  --   --   --   --   --   --   --   --  2.6*  --   --    SODIUM mmol/L 170*  --  170*  --  169*   < > 175*   < > 179*   < > 179*   POTASSIUM mmol/L 3.8  --  3.5  --  4.2   < > 4.3   < > 3.8   < > 3.9   CHLORIDE mmol/L 137*  --  139*  --  139*   < > >140*   < > >140*   < > >140*   CO2 mmol/L 13.8*  --  12.0*  --  11.3*   < > 13.4*   < > 13.7*   < > 14.2*   BUN mg/dL 88*  --  82*  --  90*   < > 92*   < > 93*   < > 98*   CREATININE mg/dL 2.64*  --  2.43*  --  2.64*   < > 2.89*   < > 2.94*   < > 2.98*   GLUCOSE mg/dL 179*   < > 160*   < > 277*   < > 180*   < > 110*   < > 79   CALCIUM mg/dL 7.0*  --  6.4*  --  7.3*   < > 7.3*   < > 7.5*   < > 8.4   WBC 10*3/mm3  --   --   --   --   --   --  6.36  --  4.04  --  1.67*   HEMOGLOBIN g/dL  --   --   --   --   --   --  12.7  --  13.4  --  14.9   PLATELETS 10*3/mm3  --   --   --   --   --   --  154  --  156  --  147   ALT (SGPT) U/L  --   --   --   --   --   --  17  --   --   --  18   AST (SGOT) U/L  --   --   --   --   --   --  36*  --   --   --  32   PROBNP pg/mL  --   --    --   --   --   --   --   --   --   --  4,279.0*    < > = values in this interval not displayed.     Lab Results   Component Value Date    TROPONINI 0.008 02/21/2019    TROPONINT 0.222 (C) 10/02/2021     Results from last 7 days   Lab Units 10/02/21  0059 10/01/21  2132 10/01/21  1400   TROPONIN T ng/mL 0.222* 0.229* 0.216*     Results from last 7 days   Lab Units 10/01/21  1554 10/01/21  1414 10/01/21  1400   BLOODCX   --  No growth at 24 hours No growth at 24 hours   URINECX  <25,000 CFU/mL Gram Negative Bacilli*  --   --      Results from last 7 days   Lab Units 10/02/21  1011 10/02/21  0059 10/01/21  1720 10/01/21  1400   LACTATE mmol/L 7.1* 6.3* 5.1* 5.7*     Results from last 7 days   Lab Units 10/02/21  0753 10/02/21  0455 10/01/21  2051 10/01/21  1333   PH, ARTERIAL pH units 7.300* 7.186* 7.300* 7.357   PCO2, ARTERIAL mm Hg 27.7* 31.8* 26.5* 25.3*   PO2 ART mm Hg 61.8* 55.8* 59.9* 92.5   O2 SATURATION ART % 92.8* 86.8* 89.5* 97.1   FLOW RATE lpm 55.0 12.0 6.0  --    MODALITY  Heated HFNC HFNC Nasal Cannula NRB     Results from last 7 days   Lab Units 10/01/21  2123   ADENOVIRUS DETECTION BY PCR  Not Detected   CORONAVIRUS 229E  Not Detected   CORONAVIRUS HKU1  Not Detected   CORONAVIRUS NL63  Not Detected   CORONAVIRUS OC43  Not Detected   HUMAN METAPNEUMOVIRUS  Not Detected   HUMAN RHINOVIRUS/ENTEROVIRUS  Not Detected   INFLUENZA B PCR  Not Detected   PARAINFLUENZA 1  Not Detected   PARAINFLUENZA VIRUS 2  Not Detected   PARAINFLUENZA VIRUS 3  Not Detected   PARAINFLUENZA VIRUS 4  Not Detected   BORDETELLA PERTUSSIS PCR  Not Detected   BORDETELLA PARAPERTUSSIS PCR  Not Detected   CHLAMYDOPHILA PNEUMONIAE PCR  Not Detected   MYCOPLAMA PNEUMO PCR  Not Detected   RSV, PCR  Not Detected     Results from last 7 days   Lab Units 10/01/21  2132   INR  1.36*     Results from last 7 days   Lab Units 10/01/21  1554 10/01/21  1414 10/01/21  1400   BLOODCX   --  No growth at 24 hours No growth at 24 hours   URINECX   <25,000 CFU/mL Gram Negative Bacilli*  --   --      Lab Results   Component Value Date    TSH 1.030 07/21/2020     Estimated Creatinine Clearance: 8.1 mL/min (A) (by C-G formula based on SCr of 2.64 mg/dL (H)).  Results from last 7 days   Lab Units 10/01/21  1554   NITRITE UA  Negative   WBC UA /HPF 13-20*   BACTERIA UA /HPF 1+*   SQUAM EPITHEL UA /HPF 7-12*   URINECX  <25,000 CFU/mL Gram Negative Bacilli*        Imaging: I personally visualized the images of scans/x-rays performed within last 3 days.      Assessment:  Severe sepsis with septic shock  Acute hypoxemic respiratory failure  Bibasilar pneumonia  Severe hypernatremia  Acute kidney injury  Severe metabolic acidosis  Lactic acidosis  Dementia  Elevated troponin  DNR      Recommendations:  At this point we have a elderly female with severe sepsis acute respiratory failure and renal failure.  Currently requiring heated high flow oxygen 95% with 55 L and barely able to maintain sats.  She appears to be clinically declining which I suspect due to her underlying sepsis.  Continue current supportive care with antibiotics fluid and pressors  Nephrology currently following with severe hypernatremia and acute kidney injury likely all due to dehydration.  Slow correction of sodium ongoing per nephrology.  Continue current supportive care but prognosis overall is extremely poor  Patient is a DNR          Jo-Ann Person MD  10/2/2021  14:40 EDT      Much of this encounter note is an electronic transcription/translation of spoken language to printed text using Dragon Software.

## 2021-10-02 NOTE — PROGRESS NOTES
Pharmacy was consulted to dose vancomycin for sepsis/pneumonia. Based on an estimated CrCl of 7mL/min, the patients vancomycin will be intermittently dosed based on levels following the initial 750mg loading dose. Thank you for the consult. Pharmacy will continue to follow.     Thank you,  Jeffrey Bo, PharmD  00:42 EDT

## 2021-10-02 NOTE — CONSULTS
Cardiovascular Medicine          Matilda Belle MD  [address] [place] [address]       Assessment and plan    Elevated cardiac markers in the setting of severe deterioration of chronic illnesses at this point likely type II not a candidate for invasive evaluation continue with medical management as able  Septic shock requiring multi plasters discussed with RN and primary levo neomaximized likely vasopressin to be added as needed continue with heparin for above  Multiorgan failure  Acute hypoxic respiratory failure secondary to pneumonia  Severe hyponatremia  CKD stage III  Frailty  Cardiomegaly  Dementia with cognitive deficit  Gait instability  Hypertension  Malnutrition        Guarded prognosis  No acute cardiovascular interventions in the setting of septic shock expected to make a change at this point in time will monitor closely  You for allowing me to participate in this patient's care  Jake Guzmán DO                Thank you for asking me to see Lolis Infante for acute coronary syndrome.    History of Present Illness  This is a 89 y.o. female with below medical history resident of SNF with CKD dementia cognitive deficit diastolic dysfunction cardiomegaly hypertension and malnutrition admitted we are being consulted for elevated cardiac markers patient is not responsive to verbal stimuli and has no complaints she is found to be in acute encephalopathy septic shock with 3 pressors required    Review of Systems - ROS  Noncontributory due to patient condition       All other systems were reviewed and were negative.    Family history is unknown by patient.     reports that she has never smoked. She does not have any smokeless tobacco history on file. She reports that she does not drink alcohol and does not use drugs.    No Known Allergies      Current Facility-Administered Medications:   •  acetaminophen (TYLENOL) tablet 500 mg, 500 mg, Oral, Q6H PRN, Seth Vargas MD  •  ascorbic acid (VITAMIN C) tablet  500 mg, 500 mg, Oral, Daily, Seth Vargas MD  •  aspirin chewable tablet 81 mg, 81 mg, Oral, Daily, Seth Vargas MD  •  aspirin suppository 300 mg, 300 mg, Rectal, Q6H PRN, Jose Francisco Goldberg MD, 300 mg at 10/01/21 1558  •  bisacodyl (DULCOLAX) suppository 10 mg, 10 mg, Rectal, Daily PRN, Seth Vargas MD  •  cefepime 2 gm IVPB in 100 ml NS (VTB), 2 g, Intravenous, Q24H, Jose Francisco Goldberg MD  •  dextrose (D5W) 5 % infusion, 100 mL/hr, Intravenous, Continuous, Matilda Belle MD, Last Rate: 100 mL/hr at 10/02/21 1203, 100 mL/hr at 10/02/21 1203  •  famotidine (PEPCID) injection 20 mg, 20 mg, Intravenous, Daily, Jose Francisco Goldberg MD, 20 mg at 10/02/21 0833  •  heparin (porcine) 5000 UNIT/ML injection 1,200 Units, 30 Units/kg, Intravenous, PRN, Jose Francisco Goldberg MD  •  heparin (porcine) 5000 UNIT/ML injection 2,400 Units, 60 Units/kg, Intravenous, PRN, Jose Francisco Goldberg MD  •  heparin 98307 units/250 mL (100 units/mL) in 0.45 % NaCl infusion, 12 Units/kg/hr, Intravenous, Titrated, Jose Francisco Goldberg MD, Last Rate: 0.71 mL/hr at 10/02/21 0726, 1.78 Units/kg/hr at 10/02/21 0726  •  metroNIDAZOLE (FLAGYL) 500 mg/100mL IVPB, 500 mg, Intravenous, Q8H, Jose Francisco Goldberg MD, 500 mg at 10/02/21 0535  •  norepinephrine (LEVOPHED) 8 mg in 250 mL NS infusion (premix), 0.02-0.3 mcg/kg/min, Intravenous, Titrated, Jose Francisco Goldberg MD, Last Rate: 22.4 mL/hr at 10/02/21 0535, 0.3 mcg/kg/min at 10/02/21 0535  •  phenylephrine (ALTHEA-SYNEPHRINE) 50 mg in 250 mL NS infusion, 0.5-3 mcg/kg/min, Intravenous, Titrated, Jose Francisco Goldberg MD, Last Rate: 15.88 mL/hr at 10/02/21 0950, 1.4 mcg/kg/min at 10/02/21 0950  •  sodium chloride 0.9 % bolus 500 mL, 500 mL, Intravenous, Once, Seth Vargas MD, Last Rate: 250 mL/hr at 10/02/21 1100, 500 mL at 10/02/21 1100  •  [COMPLETED] Insert peripheral IV, , , Once **AND** sodium chloride 0.9 % flush 10 mL, 10 mL, Intravenous, PRN, Jose Francisco Goldberg MD  •   sodium chloride 0.9 % flush 10 mL, 10 mL, Intravenous, Q12H, Jose Francisco Goldberg MD, 10 mL at 10/02/21 0834  •  sodium chloride 0.9 % flush 10 mL, 10 mL, Intravenous, PRN, Jose Francisco Goldberg MD  •  sodium chloride 0.9 % flush 10 mL, 10 mL, Intravenous, Q12H, Jose Francisco Goldberg MD, 10 mL at 10/02/21 0833  •  sodium chloride 0.9 % flush 10 mL, 10 mL, Intravenous, PRN, Jose Francisco Goldberg MD  •  Vancomycin Pharmacy Intermittent Dosing, , Does not apply, Daily, Jose Francisco Goldberg MD  •  Vasopressin (PITRESSIN) 20 Units in sodium chloride 0.9 % 100 mL infusion, 0.03 Units/min, Intravenous, Continuous, Seth Vargas MD  •  zinc sulfate (ZINCATE) capsule 220 mg, 220 mg, Oral, BID, Seth Vargas MD    Physical Exam:  Vitals:    10/02/21 0834 10/02/21 0936 10/02/21 0944 10/02/21 0950   BP: 107/81 (!) 56/24 (!) 75/53 (!) 68/55   Pulse: 101 101 101 94   Resp:       Temp:       TempSrc:       SpO2:       Weight:       Height:         Current Pain Level: none  General: Frail, sleeping  Body Habitus: Emancipated  HEENT: Head: Normocephalic, no lesions, without obvious abnormality. No arcus senilis, xanthelasma or xanthomas.    Neuro: Sleeping  Pulses: 2+ and symmetric  JVP: Volume/Pulsation: Normal.  Normal waveforms.   Appropriate inspiratory decrease.  No Kussmaul's. No Stephy's.   Carotid Exam: no bruit normal pulsation bilaterally   Carotid Volume: normal.     Respirations: no increased work of breathing   Chest: Barrel chested    Pulmonary:Normal   Precordium: Normal impulses. P2 is not palpable.  RV Heave: absent  LV Heave: absent  Eldridge:  normal size and placement  Palpable S4: absent.  Heart rate: normal    Heart Rhythm: regular     Heart Sounds: S1: normal  S2: normal  S3: absent   S4: absent  Opening Snap: absent    Pericardial Rub:  Absent: .    Abdomen:   Appearance: normal .  Palpation: Soft, non-tender to palpation, bowel sounds positive in all four quadrants; no guarding or rebound  tenderness  Extremity: no edema.   LE Skin: no rashes  LE Hair:  normal  LE Pulses: well perfused with normal pulses in the distal extremities  Pallor on elevation: Absent. Rubor on dependency: None      DATA REVIEWED:     EKG. I personally reviewed and interpreted the EKG.  paced    ECG/EMG Results (all)       Procedure Component Value Units Date/Time    ECG 12 Lead [[id number]] Collected: 08/04/21 1805     Updated: 08/04/21 2151     QT Interval 494 ms      QTC Interval 521 ms     Narrative:      Test Reason : CP  Blood Pressure :   */*   mmHG  Vent. Rate :  67 BPM     Atrial Rate :  67 BPM     P-R Int : 160 ms          QRS Dur : 166 ms      QT Int : 494 ms       P-R-T Axes :  57 -82  80 degrees     QTc Int : 521 ms    Trial sensed ventricular pacedv rhythm  Abnormal ECG  No previous ECGs available  Confirmed by Homer Lopez ([id number]) on 8/4/2021 9:50:59 PM    Referred By: ADÁN           Confirmed By: Homer Lopez    ECG 12 Lead [[id number]] Collected: 08/04/21 2219     Updated: 08/04/21 2219    ECG 12 Lead [[id number]] Collected: 08/05/21 0253     Updated: 08/05/21 0253          ---------------------------------------------------  -----------------------------------------------------  CXR/Imaging:   Imaging Results (Most Recent)     Procedure Component Value Units Date/Time    US Venous Doppler Lower Extremity Bilateral (duplex) [[id number]] Collected: 10/02/21 1203     Updated: 10/02/21 1206    Narrative:      US VENOUS DOPPLER LOWER EXTREMITY BILATERAL (DUPLEX)-     CLINICAL INDICATION: markedly elevated d dimer; A41.9-Sepsis,  unspecified organism; R65.21-Severe sepsis with septic shock;  J18.9-Pneumonia, unspecified organism; N39.0-Urinary tract infection,  site not specified; N17.9-Acute kidney failure, unspecified;  E86.0-Dehydration; E87.0-Hyperosmolality and hypernatremia; R79.89-Other  specified abnormal findings of blood chemistry; R77.8-Other specified  abnormalities of plasm        COMPARISON: None  available      TECHNIQUE: Color Doppler imaging was used with compression and  augmentation to evaluate the lower extremity deep venous system.     FINDINGS:   There is patent spontaneous flow from the common femoral vein through  the posterior tibial veins.  There was no internal clot or area of noncompressibility.  Normal augmentation was elicited where applicable.       Impression:      No DVT in the lower extremities on today's exam.      This report was finalized on 10/2/2021 12:04 PM by Dr. Sanjeev Calero MD.       US Renal Bilateral [[id number]] Collected: 10/02/21 1055     Updated: 10/02/21 1057    Narrative:      EXAMINATION: US RENAL BILATERAL-      CLINICAL INDICATION: cb; A41.9-Sepsis, unspecified organism;  R65.21-Severe sepsis with septic shock; J18.9-Pneumonia, unspecified  organism; N39.0-Urinary tract infection, site not specified; N17.9-Acute  kidney failure, unspecified; E86.0-Dehydration; E87.0-Hyperosmolality  and hypernatremia; R79.89-Other specified abnormal findings of blood  chemistry; R77.8-Other specified abnormalities of plasma proteins        COMPARISON: None available     PROCEDURE: Sonographic imaging of the kidneys     FINDINGS:  Imaging of the right kidney demonstrates the right kidney measuring 9.3  x 3.7 x 3.5 cm. No solid mass or hydronephrosis.     Left kidney measures 6.4 x 3.7 x 2.3 cm. No solid mass or hydronephrosis       Impression:      1. Unremarkable sonographic appearance of the kidneys.     This report was finalized on 10/2/2021 10:55 AM by Dr. Sanjeev Calero MD.       XR Foot 2 View Left [[id number]] Collected: 10/01/21 2030     Updated: 10/01/21 2034    Narrative:      EXAMINATION: XR FOOT 2 VW LEFT-      CLINICAL INDICATION: left 1st toe ulcer, concern for underlying osteo;  A41.9-Sepsis, unspecified organism; R65.21-Severe sepsis with septic  shock; J18.9-Pneumonia, unspecified organism; N39.0-Urinary tract  infection, site not specified; N17.9-Acute kidney failure,  unspecified;  E86.0-Dehydration; E87.0-Hyperosmolality and hypernatremia; R79.89-Other  specified abnormal findings of blood chemistry; R77.8-Other specific        COMPARISON: None available     FINDINGS:  2 views of the left foot     There is diffuse demineralization, no fracture seen     No focal erosion is identified on the presented study       Impression:      1. Diffuse demineralization. No acute fracture. Soft tissue injury/ulcer  at the interphalangeal joint of the first digit. No definitive erosive  change.      This report was finalized on 10/1/2021 8:32 PM by Dr. Sanjeev Calero MD.       XR Chest AP [[id number]] Collected: 10/01/21 1933     Updated: 10/01/21 1935    Narrative:      XR CHEST AP-     CLINICAL INDICATION: central line; A41.9-Sepsis, unspecified organism;  R65.21-Severe sepsis with septic shock; J18.9-Pneumonia, unspecified  organism; N39.0-Urinary tract infection, site not specified; N17.9-Acute  kidney failure, unspecified; E86.0-Dehydration; E87.0-Hyperosmolality  and hypernatremia; R79.89-Other specified abnormal findings of blood  chemistry; R77.8-Other specified abnormalities of plasma proteins        COMPARISON: 10/01/2021      TECHNIQUE: Single frontal view of the chest.     FINDINGS:     Right-sided central line tip in superior vena cava. No pneumothorax  The cardiac silhouette is normal. The pulmonary vasculature is  unremarkable.  There is no evidence of an acute osseous abnormality.   There are no suspicious-appearing parenchymal soft tissue nodules.          Impression:         1. Central line as above  2. Bibasilar airspace disease     This report was finalized on 10/1/2021 7:33 PM by Dr. Sanjeev Calero MD.       NM Lung Scan Perfusion Particulate [[id number]] Collected: 10/01/21 1845     Updated: 10/01/21 1848    Narrative:      EXAMINATION: NM LUNG SCAN PERFUSION PARTICULATE-      CLINICAL INDICATION: Elevated D-dimer        COMPARISON: Chest x-ray 10/01/2021     PROCEDURE:  4.1  mCi technetium MAA was administered.  Perfusion images were then acquired.     FINDINGS:  Fairly homogeneous distribution of the radiotracer. No segmental defects  are seen.       Impression:      Based on the perfusion alone, appearance most suggestive of  low likelihood of pulmonary embolus         This report was finalized on 10/1/2021 6:46 PM by Dr. Sanjeev Calero MD.       XR Chest 1 View [[id number]] Collected: 10/01/21 1451     Updated: 10/01/21 1453    Narrative:      XR CHEST 1 VW-     CLINICAL INDICATION: Respiratory distress        COMPARISON: 07/24/2020      TECHNIQUE: Single frontal view of the chest.     FINDINGS:     Bilateral airspace disease  The cardiac silhouette is normal. The pulmonary vasculature is  unremarkable.  There is no evidence of an acute osseous abnormality.   There are no suspicious-appearing parenchymal soft tissue nodules.          Impression:      Bilateral airspace disease     This report was finalized on 10/1/2021 2:51 PM by Dr. Sanjeev Calero MD.               --------------------------------------------------------------------------------------------------  LABS:     The CVD Risk score (David et al., 2008) failed to calculate for the following reasons:    The 2008 CVD risk score is only valid for ages 30 to 74    The patient has a prior MI, stroke, CHF, or peripheral vascular disease diagnosis         Lab Results   Component Value Date    GLUCOSE 160 (H) 10/02/2021    BUN 82 (H) 10/02/2021    CREATININE 2.43 (H) 10/02/2021    EGFRIFNONA 19 (L) 10/02/2021    BCR 33.7 (H) 10/02/2021    K 3.5 10/02/2021    CO2 12.0 (L) 10/02/2021    CALCIUM 6.4 (L) 10/02/2021    ALBUMIN 2.48 (L) 10/02/2021    AST 36 (H) 10/02/2021    ALT 17 10/02/2021     Lab Results   Component Value Date    WBC 6.36 10/02/2021    HGB 12.7 10/02/2021    HCT 44.2 10/02/2021    .9 (H) 10/02/2021     10/02/2021     No results found for: CHOL, CHLPL, TRIG, HDL, LDL, LDLDIRECT  Lab Results    Component Value Date    TSH 1.030 07/21/2020     Lab Results   Component Value Date    TROPONINI 0.008 02/21/2019    TROPONINT 0.222 (C) 10/02/2021     Lab Results   Component Value Date    HGBA1C 5.50 07/21/2020     Lab Results   Component Value Date    DDIMER 1.34 (C) 07/12/2016     Lab Results   Component Value Date    ALT 17 10/02/2021     Lab Results   Component Value Date    HGBA1C 5.50 07/21/2020     Lab Results   Component Value Date    CREATININE 2.43 (H) 10/02/2021     Lab Results   Component Value Date    IRON 18 (L) 07/21/2020    TIBC 234 (L) 07/21/2020    FERRITIN 103.80 07/20/2020     Lab Results   Component Value Date    INR 1.36 (H) 10/01/2021    PROTIME 17.2 (H) 10/01/2021     This document has been electronically signed by Jake Guzmán DO on October 2, 2021 12:42 EDT

## 2021-10-02 NOTE — PROGRESS NOTES
Pharmacy was consulted to dose cefepime for HCAP. Based on an estimated CrCl of 7mL/min, an extended infusion cefepime dose of 2g q24hr has been ordered. Thank you for the consult.     Thank you,   Jeffrey Bo, PharmD  21:11 EDT

## 2021-10-03 NOTE — NURSING NOTE
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 Hip presents with a deep purple discoloration with a non blanchable mendel wound skin and epidermis intact. Order for Venelex Q shift and PRN. Wound to coccyx present with a partial thickness loss of the epidermis with a pink shallow ulcer. Wound bed is dry with some sheering noted. New order for Magic Barrier cream Q shift and PRN. Wound to toe presents with dry stable eschar. Paint with betadine daily

## 2021-10-03 NOTE — PLAN OF CARE
Goal Outcome Evaluation:           Progress: declining  Outcome Summary: Pt of % and nonrebreather sat high 80's-low 90's, requiring multiple pressors. Chest tube remains in place, site clean dry intact. pt in bed in safe position with bed alarm on.

## 2021-10-03 NOTE — NURSING NOTE
Pt currently lying in bed and appears asleep. Vasopressin, levophed, jose-synephrine, and 1/2 NS w 75 mEq bicarb currently infusing. Dr. Goldberg contacted at beginning of shift for rhythm changes that have since resolved. Pt had a bath during my shift. Pt's sister was called with updates. Otherwise, no significant changes since previous assessment. WCTM.

## 2021-10-03 NOTE — PROCEDURES
PROCEDURE NOTE    Patient Name:  Lolis Infante  YOB: 1929  [id number]    10/3/2021        PREOPERATIVE DIAGNOSIS: Iatrogenic right pneumothorax      POSTOPERATIVE DIAGNOSIS: Same       PROCEDURE PERFORMED: Right thoracostomy tube placement       SURGEON: Alen Bee MD       SPECIMENS: None       ANESTHESIA: Local       FINDINGS:   1.  8 [place] citizen thoracostomy tube placed in the right hemithorax       INDICATIONS:    The patient is a 89 y.o. female who presented to the hospital with respiratory distress and progressively worsening septic shock.  A right subclavian central line was placed in the emergency department.  Chest x-ray was obtained today was demonstrated a right-sided pneumothorax.  Thoracostomy tube was placed emergently, as the patient is a foster of the [facility].     DESCRIPTION OF PROCEDURE:      Right chest was prepped and draped.  Local anesthetic needle was directed into the right hemithorax until air was returned.  It was then retracted and local anesthetic was administered to the soft tissue.  8 [place] citizen right thoracostomy tube was placed after stab incision at the anterior axillary line, at the nipple line.  A [place] citizen chest tube was placed into the right hemithorax and advanced.  It was sutured in place with a 0 silk suture and connected to an atrium.  Dressing was placed    Chest x-ray was reviewed and demonstrate appropriate positioning of the thoracostomy tube with complete reexpansion of the lung.      Alen Bee MD  10/3/2021  12:05 EDT

## 2021-10-03 NOTE — PROGRESS NOTES
Nephrology Progress Note      Subjective     Patient is not awake, oriented and on bipap, oliguric     Objective       Vital signs :     Temp:  [96.1 °F (35.6 °C)-100.1 °F (37.8 °C)] 98 °F (36.7 °C)  Heart Rate:  [] 140  Resp:  [22-29] 22  BP: ()/() 110/68      Intake/Output Summary (Last 24 hours) at 10/3/2021 0851  Last data filed at 10/3/2021 0625  Gross per 24 hour   Intake 2738.37 ml   Output --   Net 2738.37 ml       Physical Exam:    General Appearance : on bipap  Lungs : B/L lower  Zone crackles   Heart :  regular rhythm & normal rate, normal S1, S2 and no murmur, no rub  Abdomen : normal bowel sounds, no masses, no hepatomegaly, no splenomegaly, soft non-tender and no guarding  Extremities : no edema, no cyanosis and no redness  Neurologic :  Unable to assess.       Laboratory Data :     Albumin Albumin   Date Value Ref Range Status   10/02/2021 2.48 (L) 3.50 - 5.20 g/dL Final   10/01/2021 2.89 (L) 3.50 - 5.20 g/dL Final      Magnesium Magnesium   Date Value Ref Range Status   10/01/2021 2.6 (H) 1.7 - 2.3 mg/dL Final          PTH               No results found for: PTH    CBC and coagulation:  Results from last 7 days   Lab Units 10/02/21  2245 10/02/21  1011 10/02/21  0059 10/01/21  2132 10/01/21  1720 10/01/21  1400   LACTATE mmol/L 4.8* 7.1* 6.3*  --    < > 5.7*   CRP mg/dL  --   --   --   --   --  2.04*   WBC 10*3/mm3 13.30*  --  6.36 4.04   < > 1.67*   HEMOGLOBIN g/dL 10.8*  --  12.7 13.4   < > 14.9   HEMATOCRIT % 38.1  --  44.2 46.3   < > 51.8*   MCV fL 108.5*  --  108.9* 107.7*   < > 107.7*   MCHC g/dL 28.3*  --  28.7* 28.9*   < > 28.8*   PLATELETS 10*3/mm3 123*  --  154 156   < > 147   INR   --   --   --  1.36*  --   --    D DIMER QUANT MCGFEU/mL  --   --   --   --   --  >20.00*    < > = values in this interval not displayed.     Acid/base balance:  Results from last 7 days   Lab Units 10/02/21  0753 10/02/21  0455 10/01/21  2051   PH, ARTERIAL pH units 7.300* 7.186* 7.300*   PO2  ART mm Hg 61.8* 55.8* 59.9*   PCO2, ARTERIAL mm Hg 27.7* 31.8* 26.5*   HCO3 ART mmol/L 13.6* 12.0* 13.0*     Renal and electrolytes:  Results from last 7 days   Lab Units 10/03/21  0727 10/03/21  0357 10/03/21  0044 10/02/21  2040 10/02/21  2040 10/02/21  1504 10/02/21  1504 10/02/21  0059 10/01/21  2132   SODIUM mmol/L 164* 165* 163*  --  163*  --  168*   < > 179*   POTASSIUM mmol/L 3.4* 3.5 3.7   < > 3.6   < > 4.3   < > 3.8   MAGNESIUM mg/dL  --   --   --   --   --   --   --   --  2.6*   CHLORIDE mmol/L 133* 135* 132*   < > 132*   < > 136*   < > >140*   CO2 mmol/L 14.5* 14.2* 14.4*   < > 12.9*   < > 11.8*   < > 13.7*   BUN mg/dL 87* 85* 87*   < > 86*   < > 87*   < > 93*   CREATININE mg/dL 2.83* 2.87* 2.79*  --  2.65*  --  2.79*   < > 2.94*   EGFR IF NONAFRICN AM mL/min/1.73 16* 15* 16*   < > 17*   < > 16*   < > 15*   CALCIUM mg/dL 6.3* 6.4* 6.4*   < > 6.6*   < > 6.7*   < > 7.5*    < > = values in this interval not displayed.     Estimated Creatinine Clearance: 7.8 mL/min (A) (by C-G formula based on SCr of 2.83 mg/dL (H)).    Liver and pancreatic function:  Results from last 7 days   Lab Units 10/02/21  0059 10/01/21  1400   ALBUMIN g/dL 2.48* 2.89*   BILIRUBIN mg/dL 0.5 0.6   ALK PHOS U/L 70 93   AST (SGOT) U/L 36* 32   ALT (SGPT) U/L 17 18         Cardiac:  Results from last 7 days   Lab Units 10/01/21  1400   PROBNP pg/mL 4,279.0*     Liver and pancreatic function:  Results from last 7 days   Lab Units 10/02/21  0059 10/01/21  1400   ALBUMIN g/dL 2.48* 2.89*   BILIRUBIN mg/dL 0.5 0.6   ALK PHOS U/L 70 93   AST (SGOT) U/L 36* 32   ALT (SGPT) U/L 17 18       Medications :     ascorbic acid, 500 mg, Oral, Daily  aspirin, 81 mg, Oral, Daily  cefepime, 2 g, Intravenous, Q24H  famotidine, 20 mg, Intravenous, Daily  heparin (porcine), 5,000 Units, Subcutaneous, Q12H  insulin aspart, 0-7 Units, Subcutaneous, Q6H - RT  metroNIDAZOLE, 500 mg, Intravenous, Q8H  sodium chloride, 10 mL, Intravenous, Q12H  sodium chloride,  10 mL, Intravenous, Q12H  Vancomycin Pharmacy Intermittent Dosing, , Does not apply, Daily  zinc sulfate, 220 mg, Oral, BID      dextrose, 100 mL/hr, Last Rate: 100 mL/hr (10/02/21 1203)  dilTIAZem, 5-15 mg/hr, Last Rate: 10 mg/hr (10/03/21 0816)  norepinephrine, 0.02-0.3 mcg/kg/min, Last Rate: 0.12 mcg/kg/min (10/03/21 0848)  phenylephrine, 0.5-3 mcg/kg/min, Last Rate: 1.7 mcg/kg/min (10/03/21 0825)  sodium bicarbonate drip (greater than 75 mEq/bag), 75 mEq, Last Rate: Stopped (10/03/21 0843)  vasopressin (PITRESSIN) 20 units in 100 mL infusion, 0.03 Units/min, Last Rate: 0.03 Units/min (10/03/21 0538)          Assessment/Plan       1. Hypernatremia   2. Tripple disorder, Anion gap metabolic acidosis, non anion gap metabolic acidosis, respiratory acidosis  3. TAYLER on CKD  4. Sever sepsis  5. Septic shock  6. Demential      Pt sodium has been improving appropriately but overall clinical picture is not promising with no improvement  in renal functions and becoming anuric. He is not a candidate for dialysis and recommend goals of care discussion with family.   Agree to get CXR and continue on D5W 75ml/h for now and DC if develops fluid overload.   DW Dr. Mukherjee    TAYLER likely due to pre renal/early ATN  Cr on admission was 2.9, improving tto 2.6  Requested renal USG.         Matilda Belle MD  10/03/21  08:51 EDT

## 2021-10-03 NOTE — PROGRESS NOTES
[facility] HOSPITALIST PROGRESS NOTE     Patient Identification:  Name:  Lolis Infante  Age:  89 y.o.  Sex:  female  :  1929  MRN:  [id number]  Visit Number:  [id number]  ROOM: [address]     Primary Care Provider:  Cristal Linn MD    Length of stay in inpatient status:  2    Subjective     Chief Compliant:    Chief Complaint   Patient presents with   • Respiratory Distress       History of Presenting Illness: 89-year-old female who was admitted with severe hypernatremia, acute kidney injury, acute hypoxic respiratory failure, bibasilar pneumonia.  Patient does have severe sepsis requiring pressors and does have atrial fibrillation with RVR this morning.  Patient with poor urine output overnight.    Objective     Current Hospital Meds:ascorbic acid, 500 mg, Oral, Daily  aspirin, 81 mg, Oral, Daily  cefepime, 2 g, Intravenous, Q24H  famotidine, 20 mg, Intravenous, Daily  heparin (porcine), 5,000 Units, Subcutaneous, Q12H  insulin aspart, 0-7 Units, Subcutaneous, Q6H - RT  metroNIDAZOLE, 500 mg, Intravenous, Q8H  sodium chloride, 10 mL, Intravenous, Q12H  sodium chloride, 10 mL, Intravenous, Q12H  Vancomycin Pharmacy Intermittent Dosing, , Does not apply, Daily  zinc sulfate, 220 mg, Oral, BID    dextrose, 100 mL/hr, Last Rate: 100 mL/hr (10/02/21 1203)  dilTIAZem, 5-15 mg/hr, Last Rate: 5 mg/hr (10/03/21 0801)  norepinephrine, 0.02-0.3 mcg/kg/min, Last Rate: 0.1 mcg/kg/min (10/03/21 0755)  phenylephrine, 0.5-3 mcg/kg/min, Last Rate: 1.7 mcg/kg/min (10/03/21 0825)  sodium bicarbonate drip (greater than 75 mEq/bag), 75 mEq, Last Rate: 75 mEq (10/03/21 0007)  vasopressin (PITRESSIN) 20 units in 100 mL infusion, 0.03 Units/min, Last Rate: 0.03 Units/min (10/03/21 0565)      ----------------------------------------------------------------------------------------------------------------------  Vital Signs:  Temp:  [96.1 °F (35.6 °C)-100.1 °F (37.8 °C)] 98 °F (36.7 °C)  Heart Rate:  []  140  Resp:  [22-29] 22  BP: ()/() 110/68  SpO2:  [77 %-100 %] 77 %  on  Flow (L/min):  [54-55] 55;   Device (Oxygen Therapy): heated;high-flow nasal cannula  Body mass index is 16.89 kg/m².    Wt Readings from Last 3 Encounters:   10/03/21 39.2 kg (86 lb 8 oz)   07/27/20 42.5 kg (93 lb 11.2 oz)   09/27/19 40.8 kg (89 lb 15.2 oz)     Intake & Output (last 3 days)       09/30 0701 - 10/01 0700 10/01 0701 - 10/02 0700 10/02 0701 - 10/03 0700 10/03 0701 - 10/04 0700    P.O.  0      I.V. (mL/kg)  1278.6 (33.8) 2438.4 (62.2)     IV Piggyback  2150 300     Total Intake(mL/kg)  3428.6 (90.7) 2738.4 (69.9)     Net  +3428.6 +2738.4             Urine Unmeasured Occurrence  3 x 4 x     Stool Unmeasured Occurrence  2 x 6 x         NPO Diet  ----------------------------------------------------------------------------------------------------------------------  Physical exam:  Constitutional:   Frail 89-year-old female with altered sensorium  HEENT: Normocephalic atraumatic  Neck:    Supple  Cardiovascular: Irregular irregular, ventricular rate in 130s  Pulmonary/Chest: Decreased breath sounds in the bases  Abdominal:  .  Positive bowel sounds soft  Musculoskeletal: No arthropathy  Neurological: Disoriented and only minimally responsive at this time  Skin: No rash  Peripheral vascular:  Genitourinary:  ----------------------------------------------------------------------------------------------------------------------    Last echocardiogram:  Results for orders placed during the hospital encounter of 10/01/21    Adult Transthoracic Echo Complete W/ Cont if Necessary Per Protocol    Interpretation Summary  · Left ventricular ejection fraction appears to be 46 - 50%. Left ventricular systolic function is low normal.  · The right ventricular cavity is moderately dilated.    ----------------------------------------------------------------------------------------------------------------------  Results from last 7 days   Lab  Units 10/02/21  2245 10/02/21  1011 10/02/21  0059 10/01/21  2132 10/01/21  1720 10/01/21  1400   CRP mg/dL  --   --   --   --   --  2.04*   LACTATE mmol/L 4.8* 7.1* 6.3*  --    < > 5.7*   WBC 10*3/mm3 13.30*  --  6.36 4.04   < > 1.67*   HEMOGLOBIN g/dL 10.8*  --  12.7 13.4   < > 14.9   HEMATOCRIT % 38.1  --  44.2 46.3   < > 51.8*   MCV fL 108.5*  --  108.9* 107.7*   < > 107.7*   MCHC g/dL 28.3*  --  28.7* 28.9*   < > 28.8*   PLATELETS 10*3/mm3 123*  --  154 156   < > 147   INR   --   --   --  1.36*  --   --     < > = values in this interval not displayed.     Results from last 7 days   Lab Units 10/02/21  0753   PH, ARTERIAL pH units 7.300*   PO2 ART mm Hg 61.8*   PCO2, ARTERIAL mm Hg 27.7*   HCO3 ART mmol/L 13.6*     Results from last 7 days   Lab Units 10/03/21  0727 10/03/21  0357 10/03/21  0044 10/02/21  0515 10/02/21  0059 10/01/21  2132 10/01/21  2132 10/01/21  1400 10/01/21  1400   SODIUM mmol/L 164* 165* 163*   < > 175*   < > 179*   < > 179*   POTASSIUM mmol/L 3.4* 3.5 3.7   < > 4.3   < > 3.8   < > 3.9   MAGNESIUM mg/dL  --   --   --   --   --   --  2.6*  --   --    CHLORIDE mmol/L 133* 135* 132*   < > >140*   < > >140*   < > >140*   CO2 mmol/L 14.5* 14.2* 14.4*   < > 13.4*   < > 13.7*   < > 14.2*   BUN mg/dL 87* 85* 87*   < > 92*   < > 93*   < > 98*   CREATININE mg/dL 2.83* 2.87* 2.79*   < > 2.89*   < > 2.94*   < > 2.98*   EGFR IF NONAFRICN AM mL/min/1.73 16* 15* 16*   < > 15*   < > 15*   < > 15*   CALCIUM mg/dL 6.3* 6.4* 6.4*   < > 7.3*   < > 7.5*   < > 8.4   GLUCOSE mg/dL 103* 114* 235*   < > 180*   < > 110*   < > 79   ALBUMIN g/dL  --   --   --   --  2.48*  --   --   --  2.89*   BILIRUBIN mg/dL  --   --   --   --  0.5  --   --   --  0.6   ALK PHOS U/L  --   --   --   --  70  --   --   --  93   AST (SGOT) U/L  --   --   --   --  36*  --   --   --  32   ALT (SGPT) U/L  --   --   --   --  17  --   --   --  18    < > = values in this interval not displayed.   Estimated Creatinine Clearance: 7.8 mL/min (A)  (by C-G formula based on SCr of 2.83 mg/dL (H)).  No results found for: AMMONIA  Results from last 7 days   Lab Units 10/02/21  0059 10/01/21  2132 10/01/21  1400   TROPONIN T ng/mL 0.222* 0.229* 0.216*     Results from last 7 days   Lab Units 10/01/21  1400   PROBNP pg/mL 4,279.0*         Glucose   Date/Time Value Ref Range Status   10/03/2021 0559 85 70 - 130 mg/dL Final     Comment:     Meter: [id number] : [id number] MIR BARBER   10/03/2021 0310 190 (H) 70 - 130 mg/dL Final     Comment:     Meter: [id number] : [id number] MIRORION BARBER   10/03/2021 0022 227 (H) 70 - 130 mg/dL Final     Comment:     Meter: [id number] : [id number] MIR YAVisualead   10/02/2021 2016 252 (H) 70 - 130 mg/dL Final     Comment:     Meter: [id number] : [id number] MIR RONVisualead   10/02/2021 1815 251 (H) 70 - 130 mg/dL Final     Comment:     Meter: [id number] : [id number] Canvas   10/02/2021 1509 172 (H) 70 - 130 mg/dL Final     Comment:     Meter: [id number] : [id number] Canvas   10/02/2021 1216 130 70 - 130 mg/dL Final     Comment:     Meter: [id number] : [id number] rachael alonzo   10/02/2021 0832 158 (H) 70 - 130 mg/dL Final     Comment:     Meter: [id number] : [id number] rachael alonzo     Lab Results   Component Value Date    TSH 1.030 07/21/2020     No results found for: PREGTESTUR, PREGSERUM, HCG, HCGQUANT  Pain Management Panel    There is no flowsheet data to display.       Brief Urine Lab Results  (Last result in the past 365 days)      Color   Clarity   Blood   Leuk Est   Nitrite   Protein   CREAT   Urine HCG        10/01/21 1554 Yellow Turbid Moderate (2+) Moderate (2+) Negative 30 mg/dL (1+)             Blood Culture   Date Value Ref Range Status   10/01/2021 No growth at 24 hours  Preliminary   10/01/2021 No growth at 24 hours  Preliminary     Results from last 7 days   Lab Units 10/01/21  1554   NITRITE UA  Negative   WBC UA /HPF 13-20*   BACTERIA UA /HPF 1+*   SQUAM EPITHEL UA /HPF 7-12*    URINECX  <25,000 CFU/mL Gram Negative Bacilli*     Urine Culture   Date Value Ref Range Status   10/01/2021 <25,000 CFU/mL Gram Negative Bacilli (A)  Final     No results found for: WOUNDCX  No results found for: STOOLCX  Results from last 7 days   Lab Units 10/02/21  2245 10/02/21  1011 10/02/21  0059 10/01/21  1720 10/01/21  1400   LACTATE mmol/L 4.8* 7.1* 6.3* 5.1* 5.7*   CRP mg/dL  --   --   --   --  2.04*       I have personally looked at the labs and they are summarized above.  ----------------------------------------------------------------------------------------------------------------------  Detailed radiology reports for the last 24 hours:    Imaging Results (Last 24 Hours)     ** No results found for the last 24 hours. **        Final impressions for the last 30 days of radiology reports:    XR Foot 2 View Left    Result Date: 10/1/2021  1. Diffuse demineralization. No acute fracture. Soft tissue injury/ulcer at the interphalangeal joint of the first digit. No definitive erosive change.  This report was finalized on 10/1/2021 8:32 PM by Dr. Sanjeev Calero MD.      NM Lung Scan Perfusion Particulate    Result Date: 10/1/2021  Based on the perfusion alone, appearance most suggestive of low likelihood of pulmonary embolus   This report was finalized on 10/1/2021 6:46 PM by Dr. Sanjeev Calero MD.      XR Chest 1 View    Result Date: 10/1/2021  Bilateral airspace disease  This report was finalized on 10/1/2021 2:51 PM by Dr. Sanjeev Calero MD.      US Renal Bilateral    Result Date: 10/2/2021  1. Unremarkable sonographic appearance of the kidneys.  This report was finalized on 10/2/2021 10:55 AM by Dr. Sanjeev Calero MD.      US Venous Doppler Lower Extremity Bilateral (duplex)    Result Date: 10/2/2021  No DVT in the lower extremities on today's exam.  This report was finalized on 10/2/2021 12:04 PM by Dr. Sanjeev Calero MD.      XR Chest AP    Result Date: 10/1/2021   1. Central line as above 2. Bibasilar  airspace disease  This report was finalized on 10/1/2021 7:33 PM by Dr. Sanjeev Calero MD.      I have personally looked at the radiology images and read the final radiology report.    Assessment & Plan    Septic shock--patient currently on Levophed, Lenin-Synephrine, vasopressin.  Patient is on broad-spectrum antibiotics including cefepime, Flagyl, vancomycin for treatment of pneumonia.  We will try to wean Lenin-Synephrine as this may be exacerbating A. fib with RVR    Severe hypernatremia--somewhat improved.  Initial sodium at the time of mission was 179.  This morning patient's sodium level is 165.  Appreciate nephrology input    Acute kidney injury with oliguria--we will give more hydration today.  Will obtain chest x-ray as well this morning--we will have to be careful to not cause pulmonary edema    Mixed metabolic, respiratory acidosis--patient has received bicarb over the last 24 hours.  Lactic acid level is decreased this morning.  Will give more hydration.  Patient DNR    Dementia--supportive care    Troponinemia--possibly secondary to acute kidney injury.  Patient not a candidate for interventional care.     Elevated D-dimer--had been on heparin protocol but this was discontinued.  Patient had markedly elevated PTT and did have some bleeding from around the IV site.  Patient had negative ultrasound for of the lower extremities related to possible DVT, VQ scan showed low probability of PE    Atrial fibrillation with RVR--patient was started on Cardizem drip earlier this morning.  Will attempt to decrease pressors.  Hopefully this will allow us to discontinue Cardizem drip    Acute metabolic encephalopathy superimposed on dementia secondary to severe sepsis and set severe hypernatremia.  Supportive care    Left first toe medial ulcer--local care            VTE Prophylaxis:   Mechanical Order History:     None      Pharmalogical Order History:      Ordered     Dose Route Frequency Stop    10/02/21 3590  heparin  (porcine) 5000 UNIT/ML injection 5,000 Units      5,000 Units SC Every 12 Hours Scheduled --    10/01/21 2030  heparin (porcine) 5000 UNIT/ML injection 5,000 Units  Status:  Discontinued      5,000 Units SC Every 12 Hours Scheduled 10/01/21 2042    10/01/21 2042  heparin (porcine) 5000 UNIT/ML injection 2,400 Units      60 Units/kg IV Once 10/01/21 2241    10/01/21 2042  heparin 62968 units/250 mL (100 units/mL) in 0.45 % NaCl infusion  4.78 mL/hr,   Status:  Discontinued      12 Units/kg/hr IV Titrated 10/02/21 1734    10/01/21 2042  heparin (porcine) 5000 UNIT/ML injection 2,400 Units  Status:  Discontinued      60 Units/kg IV As Needed 10/02/21 1734    10/01/21 2042  heparin (porcine) 5000 UNIT/ML injection 1,200 Units  Status:  Discontinued      30 Units/kg IV As Needed 10/02/21 1734                The patient is high risk due to the following diagnoses/reasons: Advanced age, septic shock, severe hypernatremia  Dipso: Nursing home  Seth Vargas MD  [facility]  10/03/21  08:28 EDT

## 2021-10-03 NOTE — PROGRESS NOTES
Patient had chest x-ray this morning shows a right-sided large pneumothorax.  I discussed case with state guardian on call person,Keyona Fung.  He is given consent to proceed with chest tube placement.  I also did discuss with her the fact that patient would likely be more appropriate for comfort measures which she was unable to give consent for.  Hopefully tomorrow we can talk with her regular state guardian and possibly get this consented.

## 2021-10-03 NOTE — NURSING NOTE
Called Dr. Goldberg to report sustained increased HR. Per request, I ordered an EKG that showed a-fib RVR. No new orders.

## 2021-10-03 NOTE — PROGRESS NOTES
[facility] Cardiology  INPATIENT PROGRESS NOTE    Name: Lolis Infante  Age/Sex: 89 y.o. female  :  1929        PCP: Cristal Linn MD    Assessment and plan  Discussed with RN     Elevated cardiac markers in the setting of severe deterioration of chronic illnesses at this point likely type II not a candidate for invasive evaluation continue with medical management as able  Septic shock requiring multi pressors currently on neovaso-and levo  Multiorgan failure poor prognosis suspected  Acute hypoxic respiratory failure secondary to pneumonia  Severe hyponatremia  CKD stage III  Frailty  Cardiomegaly  Dementia with cognitive deficit  Gait instability  Hypertension  Malnutrition  Abnormal EKG with low voltage concerning as above  A. fib RVR due to above illness diltiazem is not beneficial in the setting of 3 pressors at this point of discussed with nurse switching over to amiodarone IV   Guarded prognosis  Primary cardiologist to assume care in a.m.  You for allowing me to participate in this patient's care  Jake Guzmán DO               Subjective  Events overnight have been reviewed discussed in length with RN at this point patient pending chest tube placement    Vital Signs  Temp:  [96.1 °F (35.6 °C)-100.1 °F (37.8 °C)] 97.8 °F (36.6 °C)  Heart Rate:  [] 130  Resp:  [22-29] 28  BP: ()/() 129/88  Body mass index is 16.89 kg/m².    Physical exam  General patient is frail with moderate stress patient using accessory muscle for breathing  Neck: Supple. + JVD, no thyroid enlargement.  Chest: Air entry equal, normal respiration.  No rhonchi or creps.  Cardiovascular system:  Tachy noted with DERIC  Lungs on nonrebreather decreased breath sounds bilaterally   Abdomen: Soft, no tenderness, bowel sounds present, no hepatosplenomegaly.  CNS: Alert, oriented to place and time.  No motor or sensory deficit.  Cranial nerves intact.  Musculoskeletal: No deformity of the back or spine.  Extremities:   No edema.  Pulses equal on both sides.          Patient Active Problem List   Diagnosis   • Chronic kidney disease (CKD), stage III (moderate) (HCC)   • Dementia (HCC)   • Essential hypertension   • Advanced age   • Mild malnutrition (HCC)   • Septic shock (HCC)       Past Medical History:   Diagnosis Date   • Advanced age    • Cardiomegaly    • CHF (congestive heart failure) (HCC)    • Chronic kidney disease (CKD), stage III (moderate) (HCC)    • Cognitive communication deficit    • Dementia (HCC)    • Dysphagia    • Gait instability    • Grade I diastolic dysfunction    • Hypertension    • Malnutrition (HCC)        Current Facility-Administered Medications   Medication Dose Route Frequency Provider Last Rate Last Admin   • acetaminophen (TYLENOL) suppository 650 mg  650 mg Rectal Q6H PRN Jose Francisco Goldberg MD   650 mg at 10/02/21 2148   • acetaminophen (TYLENOL) tablet 500 mg  500 mg Oral Q6H PRN Seth Vargas MD       • ascorbic acid (VITAMIN C) tablet 500 mg  500 mg Oral Daily Seth Vargas MD       • aspirin chewable tablet 81 mg  81 mg Oral Daily Seth Vargas MD       • aspirin suppository 300 mg  300 mg Rectal Q6H PRN Jose Francisco Goldberg MD   300 mg at 10/01/21 1558   • bisacodyl (DULCOLAX) suppository 10 mg  10 mg Rectal Daily PRN Seth Vargas MD       • cefepime 2 gm IVPB in 100 ml NS (VTB)  2 g Intravenous Q24H Jose Francisco Goldberg MD   2 g at 10/02/21 2330   • dextrose (D50W) 25 g/ 50mL Intravenous Solution 25 g  25 g Intravenous Q15 Min PRN Jose Francisco Goldberg MD       • dextrose (D5W) 5 % infusion  75 mL/hr Intravenous Continuous Matilda Belle  mL/hr at 10/02/21 1203 100 mL/hr at 10/02/21 1203   • dextrose (GLUTOSE) oral gel 15 g  15 g Oral Q15 Min PRN Jose Francisco Goldberg MD       • dilTIAZem (CARDIZEM) 100 mg in 100 mL NS infusion (ADV)  5-15 mg/hr Intravenous Continuous Jose Francisco Goldberg MD 10 mL/hr at 10/03/21 0816 10 mg/hr at 10/03/21 0816   • famotidine (PEPCID) injection  20 mg  20 mg Intravenous Daily Jose Francisco Goldberg MD   20 mg at 10/03/21 0801   • glucagon (human recombinant) (GLUCAGEN DIAGNOSTIC) injection 1 mg  1 mg Subcutaneous Q15 Min PRN Jose Francisco Goldberg MD       • heparin (porcine) 5000 UNIT/ML injection 5,000 Units  5,000 Units Subcutaneous Q12H Seth Vargas MD   5,000 Units at 10/03/21 0801   • insulin aspart (novoLOG) injection 0-7 Units  0-7 Units Subcutaneous Q6H - RT Jose Francisco Goldberg MD   3 Units at 10/03/21 0108   • magic barrier cream 1 application  1 application Topical PRN Seth Vargas MD       • metroNIDAZOLE (FLAGYL) 500 mg/100mL IVPB  500 mg Intravenous Q8H Jose Francisco Goldberg MD   500 mg at 10/03/21 0625   • norepinephrine (LEVOPHED) 8 mg in 250 mL NS infusion (premix)  0.02-0.3 mcg/kg/min Intravenous Titrated Jose Francisco Goldberg MD 8.98 mL/hr at 10/03/21 0848 0.12 mcg/kg/min at 10/03/21 0848   • phenylephrine (ALTHEA-SYNEPHRINE) 50 mg in 250 mL NS infusion  0.5-3 mcg/kg/min Intravenous Titrated Jose Francisco Goldberg MD 2.27 mL/hr at 10/03/21 1017 0.2 mcg/kg/min at 10/03/21 1017   • sodium bicarbonate 8.4 % 75 mEq in sodium chloride 0.45 % 1,000 mL infusion (greater than 75 mEq)  75 mEq Intravenous Continuous Seth Vargas MD   Held at 10/03/21 0843   • sodium chloride 0.9 % flush 10 mL  10 mL Intravenous PRN Jose Francisco Goldberg MD       • sodium chloride 0.9 % flush 10 mL  10 mL Intravenous Q12H Jose Francisco Goldberg MD   10 mL at 10/03/21 0814   • sodium chloride 0.9 % flush 10 mL  10 mL Intravenous PRN Jose Francisco Goldberg MD       • sodium chloride 0.9 % flush 10 mL  10 mL Intravenous Q12H Jose Francisco Goldberg MD   10 mL at 10/03/21 0813   • sodium chloride 0.9 % flush 10 mL  10 mL Intravenous PRN Jose Francisco Goldberg MD       • sodium chloride 0.9 % flush 10 mL  10 mL Intravenous Q12H Seth Vargas MD   10 mL at 10/03/21 0951   • sodium chloride 0.9 % flush 10 mL  10 mL Intravenous PRN Seth Vargas MD       • sodium chloride  0.9 % flush 10 mL  10 mL Intravenous Q12H Seth Vargas MD   10 mL at 10/03/21 0951   • sodium chloride 0.9 % flush 10 mL  10 mL Intravenous Q12H Seth Vargas MD   10 mL at 10/03/21 0950   • sodium chloride 0.9 % flush 10 mL  10 mL Intravenous Q12H Seth Vargas MD   10 mL at 10/03/21 0950   • sodium chloride 0.9 % flush 10 mL  10 mL Intravenous PRN Seth Vargas MD       • sodium chloride 0.9 % flush 20 mL  20 mL Intravenous PRN Seth Vargas MD       • vancomycin 750 mg/250 mL 0.9% NS IVPB ([facility])  750 mg Intravenous Once Seth Vargas MD       • Vancomycin Pharmacy Intermittent Dosing   Does not apply Daily Jose Francisco Goldberg MD       • Vasopressin (PITRESSIN) 20 Units in sodium chloride 0.9 % 100 mL infusion  0.03 Units/min Intravenous Continuous Seth Vargas MD 9 mL/hr at 10/03/21 0538 0.03 Units/min at 10/03/21 0538   • zinc sulfate (ZINCATE) capsule 220 mg  220 mg Oral BID Seth Vargas MD           No past surgical history on file.    Social History     Socioeconomic History   • Marital status:      Spouse name: Not on file   • Number of children: Not on file   • Years of education: Not on file   • Highest education level: Not on file   Tobacco Use   • Smoking status: Never Smoker   Substance and Sexual Activity   • Alcohol use: No   • Drug use: No   • Sexual activity: Defer         Lab Results (last 24 hours)     Procedure Component Value Units Date/Time    Basic Metabolic Panel [[id number]]  (Abnormal) Collected: 10/02/21 1212    Specimen: Blood Updated: 10/02/21 1317     Glucose 179 mg/dL      BUN 88 mg/dL      Creatinine 2.64 mg/dL      Sodium 170 mmol/L      Potassium 3.8 mmol/L      Chloride 137 mmol/L      CO2 13.8 mmol/L      Calcium 7.0 mg/dL      eGFR Non African Amer 17 mL/min/1.73      BUN/Creatinine Ratio 33.3     Anion Gap 19.2 mmol/L     Narrative:      GFR Normal >60  Chronic Kidney Disease <60  Kidney Failure <15      POC Glucose Once [[id number]]  (Normal) Collected: 10/02/21 1216     Specimen: Blood Updated: 10/02/21 1310     Glucose 130 mg/dL      Comment: Meter: [id number] : [id number] rachael alonzo       aPTT [[id number]]  (Abnormal) Collected: 10/02/21 1504    Specimen: Blood Updated: 10/02/21 1608     PTT >100.0 seconds     Narrative:      PTT Heparin Therapeutic Range:  59 - 95 seconds      Basic Metabolic Panel [[id number]]  (Abnormal) Collected: 10/02/21 1504    Specimen: Blood Updated: 10/02/21 1603     Glucose 208 mg/dL      BUN 87 mg/dL      Creatinine 2.79 mg/dL      Sodium 168 mmol/L      Potassium 4.3 mmol/L      Chloride 136 mmol/L      CO2 11.8 mmol/L      Calcium 6.7 mg/dL      eGFR Non African Amer 16 mL/min/1.73      BUN/Creatinine Ratio 31.2     Anion Gap 20.2 mmol/L     Narrative:      GFR Normal >60  Chronic Kidney Disease <60  Kidney Failure <15      POC Glucose Once [[id number]]  (Abnormal) Collected: 10/02/21 1509    Specimen: Blood Updated: 10/02/21 1519     Glucose 172 mg/dL      Comment: Meter: [id number] : [id number] rachael alonzo       POC Glucose Once [[id number]]  (Abnormal) Collected: 10/02/21 1815    Specimen: Blood Updated: 10/02/21 1824     Glucose 251 mg/dL      Comment: Meter: [id number] : [id number] rachael alonzo       POC Glucose Once [[id number]]  (Abnormal) Collected: 10/02/21 2016    Specimen: Blood Updated: 10/02/21 2023     Glucose 252 mg/dL      Comment: Meter: [id number] : [id number] MIR BARBER       Basic Metabolic Panel [[id number]]  (Abnormal) Collected: 10/02/21 2040    Specimen: Blood Updated: 10/02/21 2150     Glucose 269 mg/dL      BUN 86 mg/dL      Creatinine 2.65 mg/dL      Sodium 163 mmol/L      Potassium 3.6 mmol/L      Chloride 132 mmol/L      CO2 12.9 mmol/L      Calcium 6.6 mg/dL      eGFR Non African Amer 17 mL/min/1.73      BUN/Creatinine Ratio 32.5     Anion Gap 18.1 mmol/L     Narrative:      GFR Normal >60  Chronic Kidney Disease <60  Kidney Failure <15      aPTT [[id number]]  (Abnormal) Collected: 10/02/21 1819     Specimen: Blood Updated: 10/02/21 2303     PTT 46.1 seconds     Narrative:      PTT Heparin Therapeutic Range:  59 - 95 seconds      CBC & Differential [[id number]]  (Abnormal) Collected: 10/02/21 2245    Specimen: Blood Updated: 10/03/21 0021    Narrative:      The following orders were created for panel order CBC & Differential.  Procedure                               Abnormality         Status                     ---------                               -----------         ------                     CBC Auto Differential[[id number]]        Abnormal            Final result               Scan Slide[[id number]]                                                                    Please view results for these tests on the individual orders.    Lactic Acid, Plasma [[id number]]  (Abnormal) Collected: 10/02/21 2245    Specimen: Blood Updated: 10/02/21 2345     Lactate 4.8 mmol/L     CBC Auto Differential [[id number]]  (Abnormal) Collected: 10/02/21 2245    Specimen: Blood Updated: 10/03/21 0021     WBC 13.30 10*3/mm3      RBC 3.51 10*6/mm3      Hemoglobin 10.8 g/dL      Hematocrit 38.1 %      .5 fL      MCH 30.8 pg      MCHC 28.3 g/dL      RDW 18.4 %      RDW-SD 73.1 fl      MPV 12.9 fL      Platelets 123 10*3/mm3     Manual Differential [[id number]]  (Abnormal) Collected: 10/02/21 2245    Specimen: Blood Updated: 10/03/21 0021     Neutrophil % 66.0 %      Lymphocyte % 11.0 %      Monocyte % 4.0 %      Bands %  16.0 %      Metamyelocyte % 1.0 %      Myelocyte % 2.0 %      Neutrophils Absolute 10.91 10*3/mm3      Lymphocytes Absolute 1.46 10*3/mm3      Monocytes Absolute 0.53 10*3/mm3      nRBC 3.0 /100 WBC      Anisocytosis Mod/2+     [place] Cells Slight/1+     Hypochromia Mod/2+     Macrocytes Mod/2+     Platelet Morphology Normal    POC Glucose Once [[id number]]  (Abnormal) Collected: 10/03/21 0022    Specimen: Blood Updated: 10/03/21 0028     Glucose 227 mg/dL      Comment: Meter: [id number] : [id number] MIR BARBER        Basic Metabolic Panel [[id number]]  (Abnormal) Collected: 10/03/21 0044    Specimen: Blood Updated: 10/03/21 0133     Glucose 235 mg/dL      BUN 87 mg/dL      Creatinine 2.79 mg/dL      Sodium 163 mmol/L      Potassium 3.7 mmol/L      Chloride 132 mmol/L      CO2 14.4 mmol/L      Calcium 6.4 mg/dL      eGFR Non African Amer 16 mL/min/1.73      BUN/Creatinine Ratio 31.2     Anion Gap 16.6 mmol/L     Narrative:      GFR Normal >60  Chronic Kidney Disease <60  Kidney Failure <15      POC Glucose Once [[id number]]  (Abnormal) Collected: 10/03/21 0310    Specimen: Blood Updated: 10/03/21 0317     Glucose 190 mg/dL      Comment: Meter: [id number] : [id number] [employer]       Basic Metabolic Panel [[id number]]  (Abnormal) Collected: 10/03/21 0357    Specimen: Blood Updated: 10/03/21 0444     Glucose 114 mg/dL      BUN 85 mg/dL      Creatinine 2.87 mg/dL      Sodium 165 mmol/L      Potassium 3.5 mmol/L      Chloride 135 mmol/L      CO2 14.2 mmol/L      Calcium 6.4 mg/dL      eGFR Non African Amer 15 mL/min/1.73      BUN/Creatinine Ratio 29.6     Anion Gap 15.8 mmol/L     Narrative:      GFR Normal >60  Chronic Kidney Disease <60  Kidney Failure <15      POC Glucose Once [[id number]]  (Normal) Collected: 10/03/21 0559    Specimen: Blood Updated: 10/03/21 0616     Glucose 85 mg/dL      Comment: Meter: [id number] : [id number] [employer]       Basic Metabolic Panel [[id number]]  (Abnormal) Collected: 10/03/21 0727    Specimen: Blood Updated: 10/03/21 0819     Glucose 103 mg/dL      BUN 87 mg/dL      Creatinine 2.83 mg/dL      Sodium 164 mmol/L      Potassium 3.4 mmol/L      Chloride 133 mmol/L      CO2 14.5 mmol/L      Calcium 6.3 mg/dL      eGFR Non African Amer 16 mL/min/1.73      BUN/Creatinine Ratio 30.7     Anion Gap 16.5 mmol/L     Narrative:      GFR Normal >60  Chronic Kidney Disease <60  Kidney Failure <15      Vancomycin, Random [[id number]]  (Normal) Collected: 10/03/21 0749    Specimen: Blood Updated:  10/03/21 0840     Vancomycin Random 9.40 mcg/mL     Narrative:      Therapeutic Ranges for Vancomycin    Vancomycin Random   5.0-40.0 mcg/mL  Vancomycin Trough   5.0-20.0 mcg/mL  Vancomycin Peak     20.0-40.0 mcg/mL    POC Glucose Once [[id number]]  (Normal) Collected: 10/03/21 0943    Specimen: Blood Updated: 10/03/21 0958     Glucose 110 mg/dL      Comment: Meter: [id number] : [id number] MADELEINE RIDER             This document has been electronically signed by Jake Guzmán DO on October 3, 2021 10:49 EDT         Part of this note may be an electronic transcription/translation of spoken language to printed text using the Dragon Dictation System.

## 2021-10-03 NOTE — PROGRESS NOTES
"[facility] PULMONARY CARE         Dr Busch Sayied   LOS: 2 days   Patient Care Team:  Cristal Linn MD as PCP - General (Geriatric Medicine)    Chief Complaint: Septic shock with severe hyponatremia acute kidney injury dementia A. fib RVR    Interval History: Currently confused on heated high flow oxygen. Events noted with right-sided iatrogenic pneumothorax chest tube placed by surgery    REVIEW OF SYSTEMS:   Confused    Ventilator/Non-Invasive Ventilation Settings (From admission, onward)    None            Vital Signs  Temp:  [96.1 °F (35.6 °C)-100.1 °F (37.8 °C)] 97.6 °F (36.4 °C)  Heart Rate:  [] 111  Resp:  [22-29] 28  BP: ()/() 95/69    Intake/Output Summary (Last 24 hours) at 10/3/2021 1407  Last data filed at 10/3/2021 0900  Gross per 24 hour   Intake 2977.27 ml   Output --   Net 2977.27 ml     Flowsheet Rows      First Filed Value   Admission Height  152.4 cm (60\") Documented at 10/01/2021 1319   Admission Weight  39.9 kg (88 lb) Documented at 10/01/2021 1319          Physical Exam:  Patient is examined using the personal protective equipment as per guidelines from infection control for this particular patient as enacted.  Hand hygiene was performed before and after patient interaction.   General Appearance:   Confused on heated high flow.  Neck midline trachea, no thyromegaly   Lungs:    Diminished breath sounds crackles in the bases    Heart:    Regular rhythm and normal rate, normal S1 and S2, no            murmur, no gallop, no rub, no click   Chest Wall:   Right-sided chest tube no subcu emphysema   Abdomen:    Soft no masses felt   Extremities:   Moves all extremities well, no edema, no cyanosis, no             redness  CNS confused  Skin no rashes no nodules  Musculoskeletal no cyanosis no clubbing normal range of motion     Results Review:        Results from last 7 days   Lab Units 10/03/21  1208 10/03/21  0727 10/03/21  0727 10/03/21  0357 10/03/21  0357   SODIUM " mmol/L 163*  --  164*  --  165*   POTASSIUM mmol/L 3.4*  --  3.4*  --  3.5   CHLORIDE mmol/L 132*  --  133*  --  135*   CO2 mmol/L 14.4*  --  14.5*  --  14.2*   BUN mg/dL 86*  --  87*  --  85*   CREATININE mg/dL 2.80*  --  2.83*  --  2.87*   GLUCOSE mg/dL 170*   < > 103*   < > 114*   CALCIUM mg/dL 6.1*  --  6.3*  --  6.4*    < > = values in this interval not displayed.     Results from last 7 days   Lab Units 10/02/21  0059 10/01/21  2132 10/01/21  1400   TROPONIN T ng/mL 0.222* 0.229* 0.216*     Results from last 7 days   Lab Units 10/02/21  2245 10/02/21  0059 10/01/21  2132   WBC 10*3/mm3 13.30* 6.36 4.04   HEMOGLOBIN g/dL 10.8* 12.7 13.4   HEMATOCRIT % 38.1 44.2 46.3   PLATELETS 10*3/mm3 123* 154 156     Results from last 7 days   Lab Units 10/02/21  2242 10/02/21  1504 10/02/21  0515 10/01/21  2132 10/01/21  2132   INR   --   --   --   --  1.36*   APTT seconds 46.1* >100.0* >100.0*   < > 28.6    < > = values in this interval not displayed.         Results from last 7 days   Lab Units 10/01/21  2132   MAGNESIUM mg/dL 2.6*         Results from last 7 days   Lab Units 10/02/21  0753   PH, ARTERIAL pH units 7.300*   PO2 ART mm Hg 61.8*   PCO2, ARTERIAL mm Hg 27.7*   HCO3 ART mmol/L 13.6*       I reviewed the patient's new clinical results.  I personally viewed and interpreted the patient's chest x-ray.        Medication Review:   ascorbic acid, 500 mg, Oral, Daily  aspirin, 81 mg, Oral, Daily  cefepime, 2 g, Intravenous, Q24H  famotidine, 20 mg, Intravenous, Daily  heparin (porcine), 5,000 Units, Subcutaneous, Q12H  insulin aspart, 0-7 Units, Subcutaneous, Q6H - RT  metroNIDAZOLE, 500 mg, Intravenous, Q8H  sodium chloride, 10 mL, Intravenous, Q12H  sodium chloride, 10 mL, Intravenous, Q12H  sodium chloride, 10 mL, Intravenous, Q12H  sodium chloride, 10 mL, Intravenous, Q12H  sodium chloride, 10 mL, Intravenous, Q12H  sodium chloride, 10 mL, Intravenous, Q12H  Vancomycin Pharmacy Intermittent Dosing, , Does not  apply, Daily  zinc sulfate, 220 mg, Oral, BID        amiodarone, 1 mg/min, Last Rate: 1 mg/min (10/03/21 1203)  amiodarone, 0.5 mg/min  dextrose, 75 mL/hr, Last Rate: 75 mL/hr (10/03/21 1051)  norepinephrine, 0.02-0.3 mcg/kg/min, Last Rate: 0.12 mcg/kg/min (10/03/21 1218)  phenylephrine, 0.5-3 mcg/kg/min, Last Rate: Stopped (10/03/21 1130)  sodium bicarbonate drip (greater than 75 mEq/bag), 75 mEq, Last Rate: Stopped (10/03/21 0857)  vasopressin (PITRESSIN) 20 units in 100 mL infusion, 0.03 Units/min, Last Rate: 0.03 Units/min (10/03/21 9265)        ASSESSMENT:   Severe sepsis with septic shock  Acute hypoxemic respiratory failure  Iatrogenic pneumothorax status post chest tube  Bibasilar pneumonia  Severe hypernatremia  Acute kidney injury  Severe metabolic acidosis  Lactic acidosis  Dementia  Elevated troponin  DNR    PLAN:  Chest tube management per surgery  Still requiring heated high flow will wean as tolerated to keep sats above 90%  Wean down pressors to keep MAP greater than 65  Severe hyponatremia slow correction per nephrology  Underlying severe dementia  Multiple medical problems and issues.  A. fib RVR now on Cardizem drip.  Overall poor prognosis and patient is palliative care appropriate.      Jo-Ann Person MD  10/03/21  14:07 EDT

## 2021-10-04 NOTE — PROGRESS NOTES
HOSPITALIST PROGRESS NOTE    Patient Identification:  Name:  Lolis Infante  Age:  89 y.o.  Sex:  female  :  1929  MRN:  [id number]  Visit Number:  [id number]  Primary Care Provider:  Cristal Linn MD    Length of stay:  3     HPI: 89 yo female being seen in follow up for septic shock    Subjective:  Patient seen and examined at bedside. She is lethargic with labored breathing. Respiratory rate approximately 6 breaths/min. Patient unable to provide history. Occasionally moaning. Remains on vasopressor support in addition to amiodarone.     Present during exam: ROB Lopez    Current Hospital Meds:  ascorbic acid, 500 mg, Oral, Daily  aspirin, 81 mg, Oral, Daily  castor oil-balsam peru, 1 application, Topical, Q12H  cefepime, 2 g, Intravenous, Q24H  famotidine, 20 mg, Intravenous, Daily  heparin (porcine), 5,000 Units, Subcutaneous, Q12H  insulin aspart, 0-7 Units, Subcutaneous, Q6H - RT  metroNIDAZOLE, 500 mg, Intravenous, Q8H  sodium chloride, 10 mL, Intravenous, Q12H  sodium chloride, 10 mL, Intravenous, Q12H  sodium chloride, 10 mL, Intravenous, Q12H  sodium chloride, 10 mL, Intravenous, Q12H  sodium chloride, 10 mL, Intravenous, Q12H  sodium chloride, 10 mL, Intravenous, Q12H  Vancomycin Pharmacy Intermittent Dosing, , Does not apply, Daily  zinc sulfate, 220 mg, Oral, BID      amiodarone, 0.5 mg/min, Last Rate: Stopped (10/04/21 0945)  norepinephrine, 0.02-0.3 mcg/kg/min, Last Rate: 0.08 mcg/kg/min (10/04/21 1032)  phenylephrine, 0.5-3 mcg/kg/min, Last Rate: Stopped (10/03/21 1130)  sodium bicarbonate 8.4 % 75 mEq in dextrose (D5W) 5 % 1,000 mL, 75 mL/hr, Last Rate: 75 mL/hr (10/04/21 1053)  vasopressin (PITRESSIN) 20 units in 100 mL infusion, 0.03 Units/min, Last Rate: 0.03 Units/min (10/04/21 0855)        Vital Signs  Temp:  [95 °F (35 °C)-98.1 °F (36.7 °C)] 95 °F (35 °C)  Heart Rate:  [] 66  Resp:  [20-28] 20  BP: ()/() 117/99      10/01/21  1319 10/01/21  1943 10/03/21  0564    Weight: 39.9 kg (88 lb) 37.8 kg (83 lb 6 oz) 39.2 kg (86 lb 8 oz)     Body mass index is 16.89 kg/m².    Physical exam:  Physical Exam  Constitutional:       General: She is not in acute distress.     Appearance: She is cachectic. She is ill-appearing and toxic-appearing.      Interventions: Nasal cannula in place.   HENT:      Head: Normocephalic and atraumatic.      Mouth/Throat:      Mouth: Mucous membranes are dry.   Eyes:      Conjunctiva/sclera: Conjunctivae normal.   Neck:      Trachea: No tracheal deviation.   Cardiovascular:      Rate and Rhythm: Regular rhythm. Bradycardia present.      Heart sounds: No murmur heard.   No friction rub. No gallop.    Pulmonary:      Effort: Bradypnea present. No respiratory distress.      Breath sounds: Decreased breath sounds present. No wheezing or rales.   Abdominal:      General: Bowel sounds are decreased. There is no distension.      Palpations: Abdomen is soft.      Tenderness: There is no guarding.   Skin:     General: Skin is cool and dry.      Coloration: Skin is mottled.      Findings: No erythema or rash.   Neurological:      Mental Status: She is lethargic.         Results Review:  Results from last 7 days   Lab Units 10/04/21  0339 10/02/21  2245 10/02/21  0059 10/01/21  2132 10/01/21  1400   WBC 10*3/mm3 13.99* 13.30* 6.36 4.04 1.67*   HEMOGLOBIN g/dL 9.0* 10.8* 12.7 13.4 14.9   HEMATOCRIT % 31.1* 38.1 44.2 46.3 51.8*   PLATELETS 10*3/mm3 80* 123* 154 156 147     Results from last 7 days   Lab Units 10/04/21  1051 10/04/21  0716 10/04/21  0339 10/03/21  2154 10/03/21  1601 10/03/21  1208 10/03/21  0727   SODIUM mmol/L 144 148* 151* 154* 158* 163* 164*   POTASSIUM mmol/L 3.6 3.6 3.5 3.8 3.3* 3.4* 3.4*   CHLORIDE mmol/L 115* 118* 120* 123* 127* 132* 133*   CO2 mmol/L 9.1* 10.0* 11.2* 11.7* 13.4* 14.4* 14.5*   BUN mg/dL 76* 75* 77* 78* 83* 86* 87*   CREATININE mg/dL 2.34* 2.50* 2.42* 2.40* 2.78* 2.80* 2.83*   CALCIUM mg/dL 7.9* 7.7* 8.5 8.4 6.2* 6.1* 6.3*    GLUCOSE mg/dL 232* 208* 209* 299* 250* 170* 103*     Results from last 7 days   Lab Units 10/02/21  0059 10/01/21  1400   BILIRUBIN mg/dL 0.5 0.6   ALK PHOS U/L 70 93   AST (SGOT) U/L 36* 32   ALT (SGPT) U/L 17 18     Results from last 7 days   Lab Units 10/01/21  2132   MAGNESIUM mg/dL 2.6*     Results from last 7 days   Lab Units 10/01/21  2132   INR  1.36*     Results from last 7 days   Lab Units 10/02/21  0059 10/01/21  2132 10/01/21  1400   TROPONIN T ng/mL 0.222* 0.229* 0.216*         Assessment/Plan     -Severe sepsis with septic shock, present on admission  -Bilateral/bibasilar pneumonia  -Acute hypoxic respiratory failure, present on admission and secondary to pneumonia  -Severe hyponatremia, present on admission due to severe dehydration  -Acute on chronic CKD stage IIIa  -Anion gap metabolic acidosis with known anion gap metabolic acidosis and respiratory acidosis  -Elevated D-dimer significantly elevated and thought to be secondary to lactic acidosis from septic shock  -Large right-sided pneumothorax status post chest tube placement  -Atrial fibrillation, new onset  -Thrombocytopenia, new onset  -Acute macrocytic anemia  -Elevated troponin T level, likely due to septic shock  -Steroid-induced hyperglycemia  -Dementia    For now, we will continue with the norepinephrine and vasopressin infusions for blood pressure support.  Continue with pharmacy dose vancomycin, cefepime and Flagyl.  The amiodarone infusion has been discontinued due to bradycardia.  Continue with as needed sliding scale insulin for now.  I appreciate the assistance of our consultants.    Despite all of the above mentioned measures, it is of my opinion that the patient has a very grim prognosis and is actively dying and would be best served by comfort measures only.     I have obtained and paperwork from the state nursing consultant from the department for aging and independent living and will indicate my recommendations and pursuit for  comfort measures.    The patient is high risk due to the following diagnoses/reasons: Septic shock, pneumonia, respiratory failure, electrolyte abnormalities with acidosis, atrial fibrillation, thrombocytopenia with bleeding risk, advanced age, dementia    I discussed the patients findings and my recommendations with nursing staff/ROB Lopez and Ms Blanka Escobar (state nurse consultant via telephone)        Disposition  Unclear at this time      Lynda Narvaez DO  10/04/21  12:07 EDT       Patient brought to ER from [facility] by EMS. Patient sent depressed text messages to sister and has history of suicidal attempts so sister called EMS. Pt states that he does not need to be here.

## 2021-10-04 NOTE — PROGRESS NOTES
LOS: 3 days     Name: Lolis Infante  Age/Sex: 89 y.o. female  :  1929        PCP: Cristal Linn MD    Active Problems:    Septic shock (HCC)      Admission Information: Lolis Infante is a 89 y.o. female with a past medical history significant for hypertension, denies heart failure, cardiomegaly, chronic kidney disease, dementia, malnutrition, and advanced age.  She presented to the ED on 10/1/2021 with respiratory distress.  The patient was admitted with septic shock, severe hypernatremia, troponin and BNP elevation.  Cardiology was consulted for troponin and BNP elevation.    Chief Complaint: Follow-up atrial fibrillation with RVR and elevated troponin    Interval history: Patient seen and examined.  She is currently on 100% NRB.  She does not respond to my voice.  Bear hugger in place.  Patient currently on vasopressin and norepinephrine.  Patient has been on amiodarone and diltiazem in the past for rate control.  Currently she is sinus in the 70s.    Subjective     Vital Signs  Vital Signs (last 72 hrs)       10/01 07  -  10/02 0659 10/02 0700  -  10/03 0659 10/03 0700  -  10/04 0659 10/04 0700  -  10/04 1626   Most Recent    Temp (°F) 94.6 -  99    96.1 -  100.1    97.6 -  98.1    95 -  97.2     97 (36.1)    Heart Rate 67 -  128    81 -  179    92 -  170    54 -  116     72    Resp 21 -  33    22 -  29      28    6 -  28     14    BP 61/43 -  134/97    50/32 -  194/100    60/26 -  142/122    58/48 -  142/94     90/79    SpO2 (%) 87 -  100    77 -  100    69 -  100    57 -  100     86        Temp:  [95 °F (35 °C)-98.1 °F (36.7 °C)] 97 °F (36.1 °C)  Heart Rate:  [] 72  Resp:  [6-28] 14  BP: ()/() 90/79  Body mass index is 16.89 kg/m².      Intake/Output Summary (Last 24 hours) at 10/4/2021 1626  Last data filed at 10/4/2021 0800  Gross per 24 hour   Intake 2992.96 ml   Output 0 ml   Net 2992.96 ml       Vitals reviewed.   Constitutional:       Appearance: Well-developed.   Neck:       Vascular: No carotid bruit or JVD.   Pulmonary:      Effort: Pulmonary effort is normal. No respiratory distress.      Breath sounds: No wheezing. Rhonchi present. Rales present.   Cardiovascular:      Normal rate. Regular rhythm.      Comments: No lower extremity edema  Skin:     General: Skin is warm and dry.   Neurological:      Mental Status: Alert and oriented to person, place, and time.         Telemetry: Sinus 70     Results Review:     Results from last 7 days   Lab Units 10/04/21  0339 10/02/21  2245 10/02/21  0059 10/01/21  2132 10/01/21  1400   WBC 10*3/mm3 13.99* 13.30* 6.36 4.04 1.67*   HEMOGLOBIN g/dL 9.0* 10.8* 12.7 13.4 14.9   PLATELETS 10*3/mm3 80* 123* 154 156 147     Results from last 7 days   Lab Units 10/04/21  1051 10/04/21  0716 10/04/21  0339 10/03/21  2154 10/03/21  1601 10/03/21  1208 10/03/21  0727   SODIUM mmol/L 144 148* 151* 154* 158* 163* 164*   POTASSIUM mmol/L 3.6 3.6 3.5 3.8 3.3* 3.4* 3.4*   CHLORIDE mmol/L 115* 118* 120* 123* 127* 132* 133*   CO2 mmol/L 9.1* 10.0* 11.2* 11.7* 13.4* 14.4* 14.5*   BUN mg/dL 76* 75* 77* 78* 83* 86* 87*   CREATININE mg/dL 2.34* 2.50* 2.42* 2.40* 2.78* 2.80* 2.83*   CALCIUM mg/dL 7.9* 7.7* 8.5 8.4 6.2* 6.1* 6.3*   GLUCOSE mg/dL 232* 208* 209* 299* 250* 170* 103*     Results from last 7 days   Lab Units 10/02/21  0059 10/01/21  2132 10/01/21  1400   TROPONIN T ng/mL 0.222* 0.229* 0.216*       Results from last 7 days   Lab Units 10/01/21  2132   INR  1.36*       I reviewed the patient's new clinical results.  I reviewed the patient's new imaging results and agree with the interpretation.  I personally viewed and interpreted the patient's EKG/Telemetry data      Medication Review:   ascorbic acid, 500 mg, Oral, Daily  aspirin, 81 mg, Oral, Daily  castor oil-balsam peru, 1 application, Topical, Q12H  cefepime, 2 g, Intravenous, Q24H  famotidine, 20 mg, Intravenous, Daily  heparin (porcine), 5,000 Units, Subcutaneous, Q12H  insulin aspart, 0-7 Units,  Subcutaneous, Q6H - RT  metroNIDAZOLE, 500 mg, Intravenous, Q8H  sodium chloride, 10 mL, Intravenous, Q12H  sodium chloride, 10 mL, Intravenous, Q12H  sodium chloride, 10 mL, Intravenous, Q12H  sodium chloride, 10 mL, Intravenous, Q12H  sodium chloride, 10 mL, Intravenous, Q12H  sodium chloride, 10 mL, Intravenous, Q12H  Vancomycin Pharmacy Intermittent Dosing, , Does not apply, Daily  zinc sulfate, 220 mg, Oral, BID      amiodarone, 0.5 mg/min, Last Rate: Stopped (10/04/21 0945)  norepinephrine, 0.02-0.3 mcg/kg/min, Last Rate: 0.14 mcg/kg/min (10/04/21 1610)  phenylephrine, 0.5-3 mcg/kg/min, Last Rate: Stopped (10/03/21 1130)  sodium bicarbonate 8.4 % 75 mEq in dextrose (D5W) 5 % 1,000 mL, 75 mL/hr, Last Rate: 75 mL/hr (10/04/21 1053)  vasopressin (PITRESSIN) 20 units in 100 mL infusion, 0.03 Units/min, Last Rate: 0.03 Units/min (10/04/21 0855)        Assessment:  1. Non-STEMI, likely type II in the setting of elevated creatinine, respiratory distress, and elevated proBNP.  2. Atrial fibrillation with RVR  3. Septic shock requiring multiple pressors  4. Acute hypoxic respiratory failure secondary to pneumonia  5. Stage III chronic kidney disease  6. Dementia with cognitive deficit  7. Malnutrition      Recommendations:  1. Patient is not a candidate for invasive evaluation secondary to chronic kidney disease with creatinine of 2.34 and in the setting of severe dementia, advanced age, frailty and malnutrition.  Recommend medical management only.  Continue aspirin.  Start Plavix and atorvastatin.  Consider adding ACE/beta-blocker when/if blood pressure tolerates.  2. With regard to atrial fibrillation with RVR, patient is currently in sinus rhythm.  A. fib with RVR occurred yesterday and was felt to be secondary to Lenin-Synephrine which has now been discontinued.  Currently amiodarone has been held due to a low heart rate.  Will discontinue this.  Will consider anticoagulation with Eliquis should A. fib recur.    I  discussed the patients findings and my recommendations with patient and family      LUX Ennis  10/04/21  16:26 EDT    Addendum:

## 2021-10-04 NOTE — PROGRESS NOTES
"[facility] PULMONARY CARE         Dr Busch Sayied   LOS: 3 days   Patient Care Team:  Cristal Linn MD as PCP - General (Geriatric Medicine)    Chief Complaint: Septic shock with severe hyponatremia acute kidney injury dementia A. fib RVR    Interval History: Confused and appears to be terminal taking shallow breaths    REVIEW OF SYSTEMS:   Confused    Ventilator/Non-Invasive Ventilation Settings (From admission, onward)    None            Vital Signs  Temp:  [95 °F (35 °C)-98.1 °F (36.7 °C)] 95 °F (35 °C)  Heart Rate:  [] 66  Resp:  [20-28] 20  BP: ()/() 117/99    Intake/Output Summary (Last 24 hours) at 10/4/2021 1330  Last data filed at 10/4/2021 0800  Gross per 24 hour   Intake 2992.96 ml   Output 0 ml   Net 2992.96 ml     Flowsheet Rows      First Filed Value   Admission Height  152.4 cm (60\") Documented at 10/01/2021 1319   Admission Weight  39.9 kg (88 lb) Documented at 10/01/2021 1319          Physical Exam:  Patient is examined using the personal protective equipment as per guidelines from infection control for this particular patient as enacted.  Hand hygiene was performed before and after patient interaction.   General Appearance:   Confused and taking shallow breaths  Neck midline trachea, no thyromegaly   Lungs:    Diminished breath sounds crackles in the bases    Heart:    Regular rhythm and normal rate, normal S1 and S2, no            murmur, no gallop, no rub, no click   Chest Wall:   Right-sided chest tube no subcu emphysema   Abdomen:    Soft no masses felt   Extremities:   Moves all extremities well, no edema, no cyanosis, no             redness  CNS confused  Skin no rashes no nodules  Musculoskeletal no cyanosis no clubbing normal range of motion     Results Review:        Results from last 7 days   Lab Units 10/04/21  1051 10/04/21  0716 10/04/21  0716 10/04/21  0339 10/04/21  0339   SODIUM mmol/L 144  --  148*  --  151*   POTASSIUM mmol/L 3.6  --  3.6  --  3.5 "   CHLORIDE mmol/L 115*  --  118*  --  120*   CO2 mmol/L 9.1*  --  10.0*  --  11.2*   BUN mg/dL 76*  --  75*  --  77*   CREATININE mg/dL 2.34*  --  2.50*  --  2.42*   GLUCOSE mg/dL 232*   < > 208*   < > 209*   CALCIUM mg/dL 7.9*  --  7.7*  --  8.5    < > = values in this interval not displayed.     Results from last 7 days   Lab Units 10/02/21  0059 10/01/21  2132 10/01/21  1400   TROPONIN T ng/mL 0.222* 0.229* 0.216*     Results from last 7 days   Lab Units 10/04/21  0339 10/02/21  2245 10/02/21  0059   WBC 10*3/mm3 13.99* 13.30* 6.36   HEMOGLOBIN g/dL 9.0* 10.8* 12.7   HEMATOCRIT % 31.1* 38.1 44.2   PLATELETS 10*3/mm3 80* 123* 154     Results from last 7 days   Lab Units 10/02/21  2242 10/02/21  1504 10/02/21  0515 10/01/21  2132 10/01/21  2132   INR   --   --   --   --  1.36*   APTT seconds 46.1* >100.0* >100.0*   < > 28.6    < > = values in this interval not displayed.         Results from last 7 days   Lab Units 10/01/21  2132   MAGNESIUM mg/dL 2.6*         Results from last 7 days   Lab Units 10/02/21  0753   PH, ARTERIAL pH units 7.300*   PO2 ART mm Hg 61.8*   PCO2, ARTERIAL mm Hg 27.7*   HCO3 ART mmol/L 13.6*       I reviewed the patient's new clinical results.  I personally viewed and interpreted the patient's chest x-ray.        Medication Review:   ascorbic acid, 500 mg, Oral, Daily  aspirin, 81 mg, Oral, Daily  castor oil-balsam peru, 1 application, Topical, Q12H  cefepime, 2 g, Intravenous, Q24H  famotidine, 20 mg, Intravenous, Daily  heparin (porcine), 5,000 Units, Subcutaneous, Q12H  insulin aspart, 0-7 Units, Subcutaneous, Q6H - RT  metroNIDAZOLE, 500 mg, Intravenous, Q8H  sodium chloride, 10 mL, Intravenous, Q12H  sodium chloride, 10 mL, Intravenous, Q12H  sodium chloride, 10 mL, Intravenous, Q12H  sodium chloride, 10 mL, Intravenous, Q12H  sodium chloride, 10 mL, Intravenous, Q12H  sodium chloride, 10 mL, Intravenous, Q12H  vancomycin, 750 mg, Intravenous, Once  Vancomycin Pharmacy Intermittent  Dosing, , Does not apply, Daily  zinc sulfate, 220 mg, Oral, BID        amiodarone, 0.5 mg/min, Last Rate: Stopped (10/04/21 0945)  norepinephrine, 0.02-0.3 mcg/kg/min, Last Rate: 0.08 mcg/kg/min (10/04/21 1032)  phenylephrine, 0.5-3 mcg/kg/min, Last Rate: Stopped (10/03/21 1130)  sodium bicarbonate 8.4 % 75 mEq in dextrose (D5W) 5 % 1,000 mL, 75 mL/hr, Last Rate: 75 mL/hr (10/04/21 1053)  vasopressin (PITRESSIN) 20 units in 100 mL infusion, 0.03 Units/min, Last Rate: 0.03 Units/min (10/04/21 0855)        ASSESSMENT:   Severe sepsis with septic shock  Acute hypoxemic respiratory failure  Iatrogenic pneumothorax status post chest tube  Bibasilar pneumonia  Severe hypernatremia  Acute kidney injury  Severe metabolic acidosis  Lactic acidosis  Dementia  Elevated troponin  DNR    PLAN:  She appears to be terminal at this time with worsening metabolic acidosis with bicarb down to 9.  Discussed with hospitalist Dr. Narvaez and I think we should proceed with comfort care if the state allows.  Chest tube management per surgery  Still requiring heated high flow will wean as tolerated to keep sats above 90%  Wean down pressors to keep MAP greater than 65  Severe hyponatremia slow correction per nephrology  Underlying severe dementia  Multiple medical problems and issues.  A. fib RVR now on Cardizem drip.  Overall poor prognosis and patient is palliative care appropriate.      Jo-Ann Person MD  10/04/21  13:30 EDT

## 2021-10-04 NOTE — CASE MANAGEMENT/SOCIAL WORK
Discharge Planning Assessment  [facility]     Patient Name: Lolis Infante  MRN: [id number]  Today's Date: 10/4/2021    Admit Date: 10/1/2021    Discharge Needs Assessment     Row Name 10/04/21 0858       Living Environment    Lives With  facility resident    Current Living Arrangements  extended care facility    Able to Return to Prior Arrangements  yes       Resource/Environmental Concerns    Resource/Environmental Concerns  none       Transition Planning    Patient/Family Anticipates Transition to  long-term care facility    Patient/Family Anticipated Services at Transition  skilled nursing    Transportation Anticipated  health plan transportation       Discharge Needs Assessment    Current Outpatient/Agency/Support Group  skilled nursing facility    Equipment Currently Used at Home  none    Concerns to be Addressed  no discharge needs identified    Anticipated Changes Related to Illness  none    Equipment Needed After Discharge  none    Outpatient/Agency/Support Group Needs  skilled nursing facility    Discharge Facility/Level of Care Needs  nursing facility, skilled        Discharge Plan     Row Name 10/04/21 0859       Plan    Plan Pt is a resident at [facility]. Pt is in covid isolation due to roommate being covid positive. Pt is on heated high flow at 100% and nonrebreather. State guardian is Emilie Soto. SS spoke with Shilpi at [facility] who states pt has 30 day bed hold upon admission. Pt will need EMS arranged for transportation back to the nursing home at discharge. SS to follow and assist.    Patient/Family in Agreement with Plan  yes        Demographic Summary     Row Name 10/04/21 0858       General Information    Referral Source  nursing    Reason for Consult  -- nursing home resident        CAMILO Navarro

## 2021-10-04 NOTE — PLAN OF CARE
Goal Outcome Evaluation:  Plan of Care Reviewed With: patient, sibling        Progress: declining  Outcome Summary: Pt on heated high flow and nonrebreather, sat low 80's. Pt remains on vasopressors, chest tube remains in place site clean, dry and intact. pt with bed in safe position, bed alarm on.

## 2021-10-04 NOTE — CASE MANAGEMENT/SOCIAL WORK
Discharge Planning Assessment  [facility]     Patient Name: Lolis Infante  MRN: [id number]  Today's Date: 10/4/2021    Admit Date: 10/1/2021        Discharge Plan     Row Name 10/04/21 1427       Plan    Plan  SS faxed Pt's Physician's Recommendation for End of Life Care form to Blanka Escobar at [facility]. SS also faxed H&P and physicians notes. Blanka's contact information is [phone] (phone) and [phone] (fax). Pt has a state guardian and is being made comfort measures. SS will continue to follow and assist as needed.    Patient/Family in Agreement with Plan  yes          Expected Discharge Date and Time     Expected Discharge Date Expected Discharge Time    Oct 6, 2021           ASHLEY Solo

## 2021-10-04 NOTE — PROGRESS NOTES
Pharmacokinetics Service Note:    Ms. Infante continues on day 3 of 5 of vancomycin for her B pneumonia and septic shock.  A 21 hour post infusion random level was reported as 14 mg/L today.  Her serum Cr is 2.34 mg/dL today with an estimated ClCr remaining less than 10 mL/min.  Will redose with 750 mg this afternoon to maintain adequate levels.  Will continue to follow her serum Cr trend and will plan to repeat a level Wednesday AM if treatment continues to guide further dosing.      Thank you.  Ame Beavers, Pharm.D.  10/4/2021  12:29 EDT

## 2021-10-04 NOTE — DISCHARGE PLACEMENT REQUEST
"Ashley Irby (89 y.o. Female)     Date of Birth Social Security Number Address Home Phone MRN    06/26/1929  [address]  [facility] [address] [phone] [id number]    Spiritism Marital Status          Pentecostal        Admission Date Admission Type Admitting Provider Attending Provider Department, Room/Bed    10/1/21 Emergency Jose Francisco Goldberg MD Grace, Aimee Russell, DO [facility] CRITICAL CARE, C221/1C    Discharge Date Discharge Disposition Discharge Destination                       Attending Provider: Lynda Narvaez DO    Allergies: No Known Allergies    Isolation: Enh Drop/Con   Infection: COVID (rule out) (10/01/21), MRSA No Isolation this Admit (10/04/21)   Code Status: No CPR    Ht: 152.4 cm (60\")   Wt: 39.2 kg (86 lb 8 oz)    Admission Cmt: None   Principal Problem: None                Active Insurance as of 10/1/2021     Primary Coverage     Payor Plan Insurance Group Employer/Plan Group    MEDICARE MEDICARE A & B      Payor Plan Address Payor Plan Phone Number Payor Plan Fax Number Effective Dates    [address] [phone]  7/1/1994 - None Entered    [facility] [id number]       Subscriber Name Subscriber Birth Date Member ID       ASHLEY IRBY 6/26/1929 [id number]           Secondary Coverage     Payor Plan Insurance Group Employer/Plan Group    [place]      Payor Plan Address Payor Plan Phone Number Payor Plan Fax Number Effective Dates    [address] [phone]  10/4/2019 - None Entered    [place] [address]       Subscriber Name Subscriber Birth Date Member ID       ASHLEY IRBY 6/26/1929 [id number]                 Emergency Contacts      (Rel.) Home Phone Work Phone Mobile Phone    DAMIÁN IRBY (Sister) [phone] -- [phone]    FENG MORA (GUARDIAN) (Guardian) [phone] -- [phone]               History & Physical      Jose Francisco Goldberg MD at 10/01/21 1951          Hospitalist History and " Physical        Patient Identification  Name: Lolis Ifnante  Age/Sex: 89 y.o. female  :  1929        MRN: [id number]  Visit Number: [id number]  Admit Date: 10/1/2021   PCP: Cristal Linn MD          Chief complaint respiratory distress    History of Present Illness:  Patient is a 89 y.o. female nursing home resident with history of stage III CKD, dementia, cognitive communication deficit, grade 1 diastolic CHF with cardiomegaly, HTN, gait instability and malnutrition, who presents with reports of respiratory distress. Per nursing home staff, the patient exhibited increased shortness of breath today. Her roommate reportedly has COVID-19. EMS reports she was hypoxic with O2 sat in the 40% range upon arrival. They placed her on nasal cannula 6L and 100% NRB mask and her O2 sat improved to the 80s. She was also noted to be hypotensive. She is unable to give any history herself.     Upon arrival to the ED, she was tachypneic with RR 32, hypotensive with BP 61/43, with O2 sat 100%. ABG showed adequate oxygenation with PO2 92, sat 97, pH 7.357 with CO2 25 and bicarb of 14 on the 100% NRB mask. She has been weaned down to 6L NC and O2 sat has been documented as staying in the 90s. Repeat ABG is pending. Labs showed elevated troponin and BNP, severe hypernatremia, acute on CKD, low bicarb, CRP of 2.04 and lactate of 5.7, d-dimer >20, WBC 1.67 with normal hemoglobin and platelets but likely falsely high from dehydration, with 6% bands. UA suggests possible UTI though could be contaminated with 7-12 squamous epithelial cells present. Chest XR showed bibasilar airspace disease. She remained hypotensive despite IV fluid bolus, so IV levophed was initiated. She has an appointed guardian who confirmed over the phone that she is DNR/DNI. She is being admitted to the CCU.     Review of Systems  Review of Systems   Unable to perform ROS: Patient nonverbal (she has acute encephalopathy superimposed on dementia)        History  Past Medical History:   Diagnosis Date   • Advanced age    • Cardiomegaly    • Chronic kidney disease (CKD), stage III (moderate) (CMS/HCC)    • Cognitive communication deficit    • Dementia (CMS/HCC)    • Dysphagia    • Gait instability    • Grade I diastolic dysfunction    • Hypertension    • Malnutrition (CMS/HCC)      No past surgical history on file.  Family History   Family history unknown: Yes     Social History     Tobacco Use   • Smoking status: Never Smoker   Substance Use Topics   • Alcohol use: No   • Drug use: No     (Not in a hospital admission)    Allergies:  Patient has no known allergies.    Objective     Vital Signs  Temp:  [94.6 °F (34.8 °C)-99 °F (37.2 °C)] 99 °F (37.2 °C)  Heart Rate:  [] 126  Resp:  [32] 32  BP: ()/(34-97) 122/77  Body mass index is 17.19 kg/m².    Physical Exam:  Physical Exam  Constitutional:       Comments: Elderly female, critically ill, tachypneic, eyes open some on occasion, but not speaking or following commands.    HENT:      Head: Normocephalic and atraumatic.      Right Ear: External ear normal.      Left Ear: External ear normal.      Nose: Nose normal.      Mouth/Throat:      Mouth: Mucous membranes are dry.      Pharynx: Oropharynx is clear.   Eyes:      Extraocular Movements: Extraocular movements intact.      Conjunctiva/sclera: Conjunctivae normal.      Pupils: Pupils are equal, round, and reactive to light.   Neck:      Comments: Central line in place right subclavian  Cardiovascular:      Rate and Rhythm: Regular rhythm. Tachycardia present.      Pulses: Normal pulses.      Heart sounds: No murmur heard.     Pulmonary:      Comments: Tachypneic; rhonchorous breath sounds bilaterally, more pronounced on left side  Abdominal:      General: Abdomen is flat. Bowel sounds are normal. There is no distension.      Palpations: Abdomen is soft.      Tenderness: There is no abdominal tenderness.   Musculoskeletal:      Cervical back: Neck  supple. No tenderness.      Right lower leg: Edema (trace pedal edema) present.      Left lower leg: Edema (trace pedal edema) present.   Lymphadenopathy:      Cervical: No cervical adenopathy.   Skin:     General: Skin is warm and dry.      Capillary Refill: Capillary refill takes less than 2 seconds.      Coloration: Skin is not jaundiced.      Findings: Bruising present.   Neurological:      Comments: Obtunded, not speaking or following commands, only response is will occasionally open eyes slightly   Psychiatric:      Comments: Unable to assess           Results Review:       Lab Results:  Results from last 7 days   Lab Units 10/01/21  1400   WBC 10*3/mm3 1.67*   HEMOGLOBIN g/dL 14.9   PLATELETS 10*3/mm3 147     Results from last 7 days   Lab Units 10/01/21  1400   CRP mg/dL 2.04*     Results from last 7 days   Lab Units 10/01/21  1400   SODIUM mmol/L 179*   POTASSIUM mmol/L 3.9   CHLORIDE mmol/L >140*   CO2 mmol/L 14.2*   BUN mg/dL 98*   CREATININE mg/dL 2.98*   CALCIUM mg/dL 8.4   GLUCOSE mg/dL 79         No results found for: HGBA1C  Results from last 7 days   Lab Units 10/01/21  1400   BILIRUBIN mg/dL 0.6   ALK PHOS U/L 93   AST (SGOT) U/L 32   ALT (SGPT) U/L 18     Results from last 7 days   Lab Units 10/01/21  1400   TROPONIN T ng/mL 0.216*             Results from last 7 days   Lab Units 10/01/21  1333   PH, ARTERIAL pH units 7.357   PO2 ART mm Hg 92.5   PCO2, ARTERIAL mm Hg 25.3*   HCO3 ART mmol/L 14.2*       I have reviewed the patient's laboratory results.    Imaging:  Imaging Results (Last 72 Hours)     Procedure Component Value Units Date/Time    XR Chest AP [[id number]] Collected: 10/01/21 1933     Updated: 10/01/21 1935    Narrative:      XR CHEST AP-     CLINICAL INDICATION: central line; A41.9-Sepsis, unspecified organism;  R65.21-Severe sepsis with septic shock; J18.9-Pneumonia, unspecified  organism; N39.0-Urinary tract infection, site not specified; N17.9-Acute  kidney failure, unspecified;  E86.0-Dehydration; E87.0-Hyperosmolality  and hypernatremia; R79.89-Other specified abnormal findings of blood  chemistry; R77.8-Other specified abnormalities of plasma proteins        COMPARISON: 10/01/2021      TECHNIQUE: Single frontal view of the chest.     FINDINGS:     Right-sided central line tip in superior vena cava. No pneumothorax  The cardiac silhouette is normal. The pulmonary vasculature is  unremarkable.  There is no evidence of an acute osseous abnormality.   There are no suspicious-appearing parenchymal soft tissue nodules.          Impression:         1. Central line as above  2. Bibasilar airspace disease     This report was finalized on 10/1/2021 7:33 PM by Dr. Sanjeev Calero MD.       NM Lung Scan Perfusion Particulate [[id number]] Collected: 10/01/21 1845     Updated: 10/01/21 1848    Narrative:      EXAMINATION: NM LUNG SCAN PERFUSION PARTICULATE-      CLINICAL INDICATION: Elevated D-dimer        COMPARISON: Chest x-ray 10/01/2021     PROCEDURE:  4.1 mCi technetium MAA was administered.  Perfusion images were then acquired.     FINDINGS:  Fairly homogeneous distribution of the radiotracer. No segmental defects  are seen.       Impression:      Based on the perfusion alone, appearance most suggestive of  low likelihood of pulmonary embolus         This report was finalized on 10/1/2021 6:46 PM by Dr. Sanjeev Calero MD.       XR Chest 1 View [[id number]] Collected: 10/01/21 1451     Updated: 10/01/21 1453    Narrative:      XR CHEST 1 VW-     CLINICAL INDICATION: Respiratory distress        COMPARISON: 07/24/2020      TECHNIQUE: Single frontal view of the chest.     FINDINGS:     Bilateral airspace disease  The cardiac silhouette is normal. The pulmonary vasculature is  unremarkable.  There is no evidence of an acute osseous abnormality.   There are no suspicious-appearing parenchymal soft tissue nodules.          Impression:      Bilateral airspace disease     This report was finalized on 10/1/2021  2:51 PM by Dr. Sanjeev Calero MD.             I have personally reviewed the patient's radiologic imaging.        EKG:   Accelerated Junctional rhythm, HR 77, QTc 307  Poor data quality  Low voltage QRS  ST & T wave abnormality, consider inferolateral ischemia  Abnormal ECG  When compared with ECG of 20-JUL-2020 21:41,  Significant changes have occurred  Confirmed by Zina Mukherjee ([id number]) on 10/1/2021 4:53:53 PM    I have personally reviewed the patient's EKG. Very difficult to interpret due to poor quality.         Assessment/Plan     - Septic shock (HCC), present on admission with tachycardia, tachypnea, leukopenia with bandemia, CRP elevation, lactic acidosis and hypotension refractory to IV fluid challenge and requiring vasopressor support. Suspect source is bibasilar pneumonia and possible UTI. Received vanc and zosyn in the ED. I have ordered cefepime and flagyl to cover for HCAP, with MRSA nasal swab ordered to determine if vancomycin is needed. Also ordered XR to look for evidence of osteomyelitis of left 1st toe which is another potential source. If this is the case, will need vancomycin on board even if MRSA nasal swab is negative. Follow up blood and urine cultures. Screened negative for COVID-19 despite reports of exposure to roommate with COVID-19. Send respiratory PCR to rule out other etiologies of pneumonia. Continue to trend lactate, CRP, WBC, differential. Continue IV fluids for now (see details below). Continue IV levophed, if remains hypotensive will need additional vasopressors ordered. Confirmed code status DNR/DNI with state appointed guardian. Still, will admit to CCU as patient is at high risk for needing additional vasopressors added since BP still marginal with levophed running.  - Severe hypernatremia, acute on stage III CKD, elevated BUN:cr ratio suggesting severe dehydration: IV normal saline boluses given near time of arrival to ED and now receiving maintenance IV NS at 125cc/hr.  Spoke with nephrology who has been consulted--Dr Belle recommends checking BMP now, and if Na has dropped by less than 10, he recommends starting D5W. If it drops 10 or more, he recommends holding IV fluids and continuing to monitor BMP q4h. If Na drops 12 or more, he has asked to be contacted for additional fluid orders.  - Troponin, BNP elevation in setting of history of grade 1 diastolic CHF: clinically appears hypovolemic and very dry. Both values may simply be elevated due to septic shock and acute on CKD. Continue to trend troponin--if it goes up despite aggressive IV fluids and improved perfusion from vasopressors, then will consult cardiology.  Already received rectal ASA in the ED and will start heparin drip in light of critically elevated d-dimer (see below). ECHO scheduled for tomorrow to evaluate further.  - Acute hypoxic respiratory failure, likely secondary to pneumonia: initial ABG obtained on 100% NRB. On 6L NC now; O2 sat was in the upper 80s during my time in the ER with her. Repeat ABG pending.   - Elevated d-dimer >20: VQ scan low probability for PE. Venous doppler lower extremities ordered for the morning. Suspect significantly elevated from septic shock, but will empirically start heparin drip while awaiting doppler results.   - Left 1st toe medial ulcer, with concern for underlying bone exposure: XR foot ordered to evaluate further. On broad spectrum IV antibiotics as noted above.    DVT/GI Prophylaxis: SQ heparin; IV pepcid    Estimated Length of Stay >2 midnights    I discussed the patient's findings, assessment and plan with ED provider Dr Apodaca and state-appointed guardian on-call telephone service.    * patient is high risk due to septic shock, bibasilar pneumonia, UTI, severe hypernatremia, acute on stage III CKD, prerenal azotemia, dehydration, troponin and BNP elevation, acute hypoxic respiratory failure, advanced age    Total critical care time 50 minutes    Jose Francisco Goldberg,  "MD  10/01/21  19:51 EDT      Electronically signed by Jose Francisco Goldberg MD at 10/01/21 2044          Physician Progress Notes (last 24 hours) (Notes from 10/03/21 1440 through 10/04/21 1440)      Jo-Ann Person MD at 10/04/21 1330                [facility]         Dr Jo-Ann Person   LOS: 3 days   Patient Care Team:  Cristal Linn MD as PCP - General (Geriatric Medicine)    Chief Complaint: Septic shock with severe hyponatremia acute kidney injury dementia A. fib RVR    Interval History: Confused and appears to be terminal taking shallow breaths    REVIEW OF SYSTEMS:   Confused    Ventilator/Non-Invasive Ventilation Settings (From admission, onward)    None            Vital Signs  Temp:  [95 °F (35 °C)-98.1 °F (36.7 °C)] 95 °F (35 °C)  Heart Rate:  [] 66  Resp:  [20-28] 20  BP: ()/() 117/99    Intake/Output Summary (Last 24 hours) at 10/4/2021 1330  Last data filed at 10/4/2021 0800  Gross per 24 hour   Intake 2992.96 ml   Output 0 ml   Net 2992.96 ml     Flowsheet Rows      First Filed Value   Admission Height  152.4 cm (60\") Documented at 10/01/2021 1319   Admission Weight  39.9 kg (88 lb) Documented at 10/01/2021 1319          Physical Exam:  Patient is examined using the personal protective equipment as per guidelines from infection control for this particular patient as enacted.  Hand hygiene was performed before and after patient interaction.   General Appearance:   Confused and taking shallow breaths  Neck midline trachea, no thyromegaly   Lungs:    Diminished breath sounds crackles in the bases    Heart:    Regular rhythm and normal rate, normal S1 and S2, no            murmur, no gallop, no rub, no click   Chest Wall:   Right-sided chest tube no subcu emphysema   Abdomen:    Soft no masses felt   Extremities:   Moves all extremities well, no edema, no cyanosis, no             redness  CNS confused  Skin no rashes no nodules  Musculoskeletal no cyanosis no clubbing " normal range of motion     Results Review:        Results from last 7 days   Lab Units 10/04/21  1051 10/04/21  0716 10/04/21  0716 10/04/21  0339 10/04/21  0339   SODIUM mmol/L 144  --  148*  --  151*   POTASSIUM mmol/L 3.6  --  3.6  --  3.5   CHLORIDE mmol/L 115*  --  118*  --  120*   CO2 mmol/L 9.1*  --  10.0*  --  11.2*   BUN mg/dL 76*  --  75*  --  77*   CREATININE mg/dL 2.34*  --  2.50*  --  2.42*   GLUCOSE mg/dL 232*   < > 208*   < > 209*   CALCIUM mg/dL 7.9*  --  7.7*  --  8.5    < > = values in this interval not displayed.     Results from last 7 days   Lab Units 10/02/21  0059 10/01/21  2132 10/01/21  1400   TROPONIN T ng/mL 0.222* 0.229* 0.216*     Results from last 7 days   Lab Units 10/04/21  0339 10/02/21  2245 10/02/21  0059   WBC 10*3/mm3 13.99* 13.30* 6.36   HEMOGLOBIN g/dL 9.0* 10.8* 12.7   HEMATOCRIT % 31.1* 38.1 44.2   PLATELETS 10*3/mm3 80* 123* 154     Results from last 7 days   Lab Units 10/02/21  2242 10/02/21  1504 10/02/21  0515 10/01/21  2132 10/01/21  2132   INR   --   --   --   --  1.36*   APTT seconds 46.1* >100.0* >100.0*   < > 28.6    < > = values in this interval not displayed.         Results from last 7 days   Lab Units 10/01/21  2132   MAGNESIUM mg/dL 2.6*         Results from last 7 days   Lab Units 10/02/21  0753   PH, ARTERIAL pH units 7.300*   PO2 ART mm Hg 61.8*   PCO2, ARTERIAL mm Hg 27.7*   HCO3 ART mmol/L 13.6*       I reviewed the patient's new clinical results.  I personally viewed and interpreted the patient's chest x-ray.        Medication Review:   ascorbic acid, 500 mg, Oral, Daily  aspirin, 81 mg, Oral, Daily  castor oil-balsam peru, 1 application, Topical, Q12H  cefepime, 2 g, Intravenous, Q24H  famotidine, 20 mg, Intravenous, Daily  heparin (porcine), 5,000 Units, Subcutaneous, Q12H  insulin aspart, 0-7 Units, Subcutaneous, Q6H - RT  metroNIDAZOLE, 500 mg, Intravenous, Q8H  sodium chloride, 10 mL, Intravenous, Q12H  sodium chloride, 10 mL, Intravenous,  Q12H  sodium chloride, 10 mL, Intravenous, Q12H  sodium chloride, 10 mL, Intravenous, Q12H  sodium chloride, 10 mL, Intravenous, Q12H  sodium chloride, 10 mL, Intravenous, Q12H  vancomycin, 750 mg, Intravenous, Once  Vancomycin Pharmacy Intermittent Dosing, , Does not apply, Daily  zinc sulfate, 220 mg, Oral, BID        amiodarone, 0.5 mg/min, Last Rate: Stopped (10/04/21 0945)  norepinephrine, 0.02-0.3 mcg/kg/min, Last Rate: 0.08 mcg/kg/min (10/04/21 1032)  phenylephrine, 0.5-3 mcg/kg/min, Last Rate: Stopped (10/03/21 1130)  sodium bicarbonate 8.4 % 75 mEq in dextrose (D5W) 5 % 1,000 mL, 75 mL/hr, Last Rate: 75 mL/hr (10/04/21 1053)  vasopressin (PITRESSIN) 20 units in 100 mL infusion, 0.03 Units/min, Last Rate: 0.03 Units/min (10/04/21 0855)        ASSESSMENT:   Severe sepsis with septic shock  Acute hypoxemic respiratory failure  Iatrogenic pneumothorax status post chest tube  Bibasilar pneumonia  Severe hypernatremia  Acute kidney injury  Severe metabolic acidosis  Lactic acidosis  Dementia  Elevated troponin  DNR    PLAN:  She appears to be terminal at this time with worsening metabolic acidosis with bicarb down to 9.  Discussed with hospitalist Dr. Narvaez and I think we should proceed with comfort care if the state allows.  Chest tube management per surgery  Still requiring heated high flow will wean as tolerated to keep sats above 90%  Wean down pressors to keep MAP greater than 65  Severe hyponatremia slow correction per nephrology  Underlying severe dementia  Multiple medical problems and issues.  A. fib RVR now on Cardizem drip.  Overall poor prognosis and patient is palliative care appropriate.      Jo-Ann Person MD  10/04/21  13:30 EDT            Electronically signed by Jo-Ann Person MD at 10/04/21 1331     Lynda Narvaez DO at 10/04/21 1207          HOSPITALIST PROGRESS NOTE    Patient Identification:  Name:  Lolis Infante  Age:  89 y.o.  Sex:  female  :  1929  MRN:  [id number]  Visit  Number:  [id number]  Primary Care Provider:  Cristal Linn MD    Length of stay:  3     HPI: 89 yo female being seen in follow up for septic shock    Subjective:  Patient seen and examined at bedside. She is lethargic with labored breathing. Respiratory rate approximately 6 breaths/min. Patient unable to provide history. Occasionally moaning. Remains on vasopressor support in addition to amiodarone.     Present during exam: ROB Lopez    Current Hospital Meds:  ascorbic acid, 500 mg, Oral, Daily  aspirin, 81 mg, Oral, Daily  castor oil-balsam peru, 1 application, Topical, Q12H  cefepime, 2 g, Intravenous, Q24H  famotidine, 20 mg, Intravenous, Daily  heparin (porcine), 5,000 Units, Subcutaneous, Q12H  insulin aspart, 0-7 Units, Subcutaneous, Q6H - RT  metroNIDAZOLE, 500 mg, Intravenous, Q8H  sodium chloride, 10 mL, Intravenous, Q12H  sodium chloride, 10 mL, Intravenous, Q12H  sodium chloride, 10 mL, Intravenous, Q12H  sodium chloride, 10 mL, Intravenous, Q12H  sodium chloride, 10 mL, Intravenous, Q12H  sodium chloride, 10 mL, Intravenous, Q12H  Vancomycin Pharmacy Intermittent Dosing, , Does not apply, Daily  zinc sulfate, 220 mg, Oral, BID      amiodarone, 0.5 mg/min, Last Rate: Stopped (10/04/21 0945)  norepinephrine, 0.02-0.3 mcg/kg/min, Last Rate: 0.08 mcg/kg/min (10/04/21 1032)  phenylephrine, 0.5-3 mcg/kg/min, Last Rate: Stopped (10/03/21 1130)  sodium bicarbonate 8.4 % 75 mEq in dextrose (D5W) 5 % 1,000 mL, 75 mL/hr, Last Rate: 75 mL/hr (10/04/21 1053)  vasopressin (PITRESSIN) 20 units in 100 mL infusion, 0.03 Units/min, Last Rate: 0.03 Units/min (10/04/21 0855)        Vital Signs  Temp:  [95 °F (35 °C)-98.1 °F (36.7 °C)] 95 °F (35 °C)  Heart Rate:  [] 66  Resp:  [20-28] 20  BP: ()/() 117/99      10/01/21  1319 10/01/21  1943 10/03/21  0540   Weight: 39.9 kg (88 lb) 37.8 kg (83 lb 6 oz) 39.2 kg (86 lb 8 oz)     Body mass index is 16.89 kg/m².    Physical exam:  Physical  Exam  Constitutional:       General: She is not in acute distress.     Appearance: She is cachectic. She is ill-appearing and toxic-appearing.      Interventions: Nasal cannula in place.   HENT:      Head: Normocephalic and atraumatic.      Mouth/Throat:      Mouth: Mucous membranes are dry.   Eyes:      Conjunctiva/sclera: Conjunctivae normal.   Neck:      Trachea: No tracheal deviation.   Cardiovascular:      Rate and Rhythm: Regular rhythm. Bradycardia present.      Heart sounds: No murmur heard.   No friction rub. No gallop.    Pulmonary:      Effort: Bradypnea present. No respiratory distress.      Breath sounds: Decreased breath sounds present. No wheezing or rales.   Abdominal:      General: Bowel sounds are decreased. There is no distension.      Palpations: Abdomen is soft.      Tenderness: There is no guarding.   Skin:     General: Skin is cool and dry.      Coloration: Skin is mottled.      Findings: No erythema or rash.   Neurological:      Mental Status: She is lethargic.         Results Review:  Results from last 7 days   Lab Units 10/04/21  0339 10/02/21  2245 10/02/21  0059 10/01/21  2132 10/01/21  1400   WBC 10*3/mm3 13.99* 13.30* 6.36 4.04 1.67*   HEMOGLOBIN g/dL 9.0* 10.8* 12.7 13.4 14.9   HEMATOCRIT % 31.1* 38.1 44.2 46.3 51.8*   PLATELETS 10*3/mm3 80* 123* 154 156 147     Results from last 7 days   Lab Units 10/04/21  1051 10/04/21  0716 10/04/21  0339 10/03/21  2154 10/03/21  1601 10/03/21  1208 10/03/21  0727   SODIUM mmol/L 144 148* 151* 154* 158* 163* 164*   POTASSIUM mmol/L 3.6 3.6 3.5 3.8 3.3* 3.4* 3.4*   CHLORIDE mmol/L 115* 118* 120* 123* 127* 132* 133*   CO2 mmol/L 9.1* 10.0* 11.2* 11.7* 13.4* 14.4* 14.5*   BUN mg/dL 76* 75* 77* 78* 83* 86* 87*   CREATININE mg/dL 2.34* 2.50* 2.42* 2.40* 2.78* 2.80* 2.83*   CALCIUM mg/dL 7.9* 7.7* 8.5 8.4 6.2* 6.1* 6.3*   GLUCOSE mg/dL 232* 208* 209* 299* 250* 170* 103*     Results from last 7 days   Lab Units 10/02/21  0059 10/01/21  1400   BILIRUBIN  mg/dL 0.5 0.6   ALK PHOS U/L 70 93   AST (SGOT) U/L 36* 32   ALT (SGPT) U/L 17 18     Results from last 7 days   Lab Units 10/01/21  2132   MAGNESIUM mg/dL 2.6*     Results from last 7 days   Lab Units 10/01/21  2132   INR  1.36*     Results from last 7 days   Lab Units 10/02/21  0059 10/01/21  2132 10/01/21  1400   TROPONIN T ng/mL 0.222* 0.229* 0.216*         Assessment/Plan     -Severe sepsis with septic shock, present on admission  -Bilateral/bibasilar pneumonia  -Acute hypoxic respiratory failure, present on admission and secondary to pneumonia  -Severe hyponatremia, present on admission due to severe dehydration  -Acute on chronic CKD stage IIIa  -Anion gap metabolic acidosis with known anion gap metabolic acidosis and respiratory acidosis  -Elevated D-dimer significantly elevated and thought to be secondary to lactic acidosis from septic shock  -Large right-sided pneumothorax status post chest tube placement  -Atrial fibrillation, new onset  -Thrombocytopenia, new onset  -Acute macrocytic anemia  -Elevated troponin T level, likely due to septic shock  -Steroid-induced hyperglycemia  -Dementia    For now, we will continue with the norepinephrine and vasopressin infusions for blood pressure support.  Continue with pharmacy dose vancomycin, cefepime and Flagyl.  The amiodarone infusion has been discontinued due to bradycardia.  Continue with as needed sliding scale insulin for now.  I appreciate the assistance of our consultants.    Despite all of the above mentioned measures, it is of my opinion that the patient has a very grim prognosis and is actively dying and would be best served by comfort measures only.     I have obtained and paperwork from the state nursing consultant from the department for aging and independent living and will indicate my recommendations and pursuit for comfort measures.    The patient is high risk due to the following diagnoses/reasons: Septic shock, pneumonia, respiratory failure,  electrolyte abnormalities with acidosis, atrial fibrillation, thrombocytopenia with bleeding risk, advanced age, dementia    I discussed the patients findings and my recommendations with nursing staff/ROB Lopez and Ms Blanka Escobar (state nurse consultant via telephone)        Disposition  Unclear at this time      Lynda Narvaez DO  10/04/21  12:07 EDT        Electronically signed by Lynda Narvaez DO at 10/04/21 1223     Matilda Belle MD at 10/04/21 0805          Nephrology Progress Note      Subjective     Pt is on heated high flow 100%, she is nonverbal.  On levophed, vasopressin    Objective       Vital signs :     Temp:  [97.6 °F (36.4 °C)-98.1 °F (36.7 °C)] 98.1 °F (36.7 °C)  Heart Rate:  [] 110  Resp:  [28] 28  BP: ()/() 136/98      Intake/Output Summary (Last 24 hours) at 10/4/2021 0805  Last data filed at 10/4/2021 0400  Gross per 24 hour   Intake 3231.86 ml   Output --   Net 3231.86 ml       Physical Exam:    General Appearance : in mild resp distress  Lungs : B/L lower  Zone crackles   Heart :  regular rhythm & normal rate, normal S1, S2 and no murmur, no rub  Abdomen : normal bowel sounds, no masses, no hepatomegaly, no splenomegaly, soft non-tender and no guarding  Extremities : no edema, no cyanosis and no redness  Neurologic :  Unable to assess.       Laboratory Data :     Albumin Albumin   Date Value Ref Range Status   10/02/2021 2.48 (L) 3.50 - 5.20 g/dL Final   10/01/2021 2.89 (L) 3.50 - 5.20 g/dL Final      Magnesium Magnesium   Date Value Ref Range Status   10/01/2021 2.6 (H) 1.7 - 2.3 mg/dL Final          PTH               No results found for: PTH    CBC and coagulation:  Results from last 7 days   Lab Units 10/04/21  0339 10/02/21  2245 10/02/21  1011 10/02/21  0059 10/01/21  2132 10/01/21  2132 10/01/21  1720 10/01/21  1400   LACTATE mmol/L  --  4.8* 7.1* 6.3*  --   --    < > 5.7*   CRP mg/dL  --   --   --   --   --   --   --  2.04*   WBC 10*3/mm3 13.99*  13.30*  --  6.36   < > 4.04   < > 1.67*   HEMOGLOBIN g/dL 9.0* 10.8*  --  12.7   < > 13.4   < > 14.9   HEMATOCRIT % 31.1* 38.1  --  44.2   < > 46.3   < > 51.8*   MCV fL 105.4* 108.5*  --  108.9*   < > 107.7*   < > 107.7*   MCHC g/dL 28.9* 28.3*  --  28.7*   < > 28.9*   < > 28.8*   PLATELETS 10*3/mm3 80* 123*  --  154   < > 156   < > 147   INR   --   --   --   --   --  1.36*  --   --    D DIMER QUANT MCGFEU/mL  --   --   --   --   --   --   --  >20.00*    < > = values in this interval not displayed.     Acid/base balance:  Results from last 7 days   Lab Units 10/02/21  0753 10/02/21  0455 10/01/21  2051   PH, ARTERIAL pH units 7.300* 7.186* 7.300*   PO2 ART mm Hg 61.8* 55.8* 59.9*   PCO2, ARTERIAL mm Hg 27.7* 31.8* 26.5*   HCO3 ART mmol/L 13.6* 12.0* 13.0*     Renal and electrolytes:  Results from last 7 days   Lab Units 10/04/21  0339 10/03/21  2154 10/03/21  1601 10/03/21  1208 10/03/21  1208 10/03/21  0727 10/03/21  0727 10/02/21  0059 10/01/21  2132   SODIUM mmol/L 151* 154* 158*  --  163*  --  164*   < > 179*   POTASSIUM mmol/L 3.5 3.8 3.3*   < > 3.4*   < > 3.4*   < > 3.8   MAGNESIUM mg/dL  --   --   --   --   --   --   --   --  2.6*   CHLORIDE mmol/L 120* 123* 127*   < > 132*   < > 133*   < > >140*   CO2 mmol/L 11.2* 11.7* 13.4*   < > 14.4*   < > 14.5*   < > 13.7*   BUN mg/dL 77* 78* 83*   < > 86*   < > 87*   < > 93*   CREATININE mg/dL 2.42* 2.40* 2.78*  --  2.80*  --  2.83*   < > 2.94*   EGFR IF NONAFRICN AM mL/min/1.73 19* 19* 16*   < > 16*   < > 16*   < > 15*   CALCIUM mg/dL 8.5 8.4 6.2*   < > 6.1*   < > 6.3*   < > 7.5*    < > = values in this interval not displayed.     Estimated Creatinine Clearance: 9.2 mL/min (A) (by C-G formula based on SCr of 2.42 mg/dL (H)).    Liver and pancreatic function:  Results from last 7 days   Lab Units 10/02/21  0059 10/01/21  1400   ALBUMIN g/dL 2.48* 2.89*   BILIRUBIN mg/dL 0.5 0.6   ALK PHOS U/L 70 93   AST (SGOT) U/L 36* 32   ALT (SGPT) U/L 17 18          Cardiac:  Results from last 7 days   Lab Units 10/01/21  1400   PROBNP pg/mL 4,279.0*     Liver and pancreatic function:  Results from last 7 days   Lab Units 10/02/21  0059 10/01/21  1400   ALBUMIN g/dL 2.48* 2.89*   BILIRUBIN mg/dL 0.5 0.6   ALK PHOS U/L 70 93   AST (SGOT) U/L 36* 32   ALT (SGPT) U/L 17 18       Medications :     ascorbic acid, 500 mg, Oral, Daily  aspirin, 81 mg, Oral, Daily  castor oil-balsam peru, 1 application, Topical, Q12H  cefepime, 2 g, Intravenous, Q24H  famotidine, 20 mg, Intravenous, Daily  heparin (porcine), 5,000 Units, Subcutaneous, Q12H  insulin aspart, 0-7 Units, Subcutaneous, Q6H - RT  metroNIDAZOLE, 500 mg, Intravenous, Q8H  sodium chloride, 10 mL, Intravenous, Q12H  sodium chloride, 10 mL, Intravenous, Q12H  sodium chloride, 10 mL, Intravenous, Q12H  sodium chloride, 10 mL, Intravenous, Q12H  sodium chloride, 10 mL, Intravenous, Q12H  sodium chloride, 10 mL, Intravenous, Q12H  Vancomycin Pharmacy Intermittent Dosing, , Does not apply, Daily  zinc sulfate, 220 mg, Oral, BID      amiodarone, 0.5 mg/min, Last Rate: 0.5 mg/min (10/04/21 0639)  dextrose, 75 mL/hr, Last Rate: 75 mL/hr (10/04/21 0132)  norepinephrine, 0.02-0.3 mcg/kg/min, Last Rate: 0.1 mcg/kg/min (10/03/21 1905)  phenylephrine, 0.5-3 mcg/kg/min, Last Rate: Stopped (10/03/21 1130)  sodium bicarbonate drip (greater than 75 mEq/bag), 75 mEq, Last Rate: Stopped (10/03/21 0843)  vasopressin (PITRESSIN) 20 units in 100 mL infusion, 0.03 Units/min, Last Rate: 0.03 Units/min (10/03/21 4938)          Assessment/Plan       1. Hypernatremia   2. Tripple disorder, Anion gap metabolic acidosis, non anion gap metabolic acidosis, respiratory acidosis  3. TAYLER on CKD  4. Sever sepsis  5. Septic shock  6. Demential      Sodium further better, remains on pressors and high flow O2,  And persistent metabolic acidosis, will add 75meq/L sodium bicarb in D5W and continue at 75ml/h, will check sodium daily now.   Ongoing transition to  comfort measures per Dr. Narvaez.     TAYLER likely due to pre renal/early ATN  Cr on admission was 2.9, improving tto 2.6        Matilda Belle MD  10/04/21  08:05 EDT      Electronically signed by Matilda Belle MD at 10/04/21 1721

## 2021-10-04 NOTE — PLAN OF CARE
Goal Outcome Evaluation:       Pt on heated high flow 100% and nonrebreather.  Nonverbal.  On levophed, vasopressin, and amiodarone.

## 2021-10-04 NOTE — PROGRESS NOTES
Nephrology Progress Note      Subjective     Pt is on heated high flow 100%, she is nonverbal.  On levophed, vasopressin    Objective       Vital signs :     Temp:  [97.6 °F (36.4 °C)-98.1 °F (36.7 °C)] 98.1 °F (36.7 °C)  Heart Rate:  [] 110  Resp:  [28] 28  BP: ()/() 136/98      Intake/Output Summary (Last 24 hours) at 10/4/2021 0805  Last data filed at 10/4/2021 0400  Gross per 24 hour   Intake 3231.86 ml   Output --   Net 3231.86 ml       Physical Exam:    General Appearance : in mild resp distress  Lungs : B/L lower  Zone crackles   Heart :  regular rhythm & normal rate, normal S1, S2 and no murmur, no rub  Abdomen : normal bowel sounds, no masses, no hepatomegaly, no splenomegaly, soft non-tender and no guarding  Extremities : no edema, no cyanosis and no redness  Neurologic :  Unable to assess.       Laboratory Data :     Albumin Albumin   Date Value Ref Range Status   10/02/2021 2.48 (L) 3.50 - 5.20 g/dL Final   10/01/2021 2.89 (L) 3.50 - 5.20 g/dL Final      Magnesium Magnesium   Date Value Ref Range Status   10/01/2021 2.6 (H) 1.7 - 2.3 mg/dL Final          PTH               No results found for: PTH    CBC and coagulation:  Results from last 7 days   Lab Units 10/04/21  0339 10/02/21  2245 10/02/21  1011 10/02/21  0059 10/01/21  2132 10/01/21  2132 10/01/21  1720 10/01/21  1400   LACTATE mmol/L  --  4.8* 7.1* 6.3*  --   --    < > 5.7*   CRP mg/dL  --   --   --   --   --   --   --  2.04*   WBC 10*3/mm3 13.99* 13.30*  --  6.36   < > 4.04   < > 1.67*   HEMOGLOBIN g/dL 9.0* 10.8*  --  12.7   < > 13.4   < > 14.9   HEMATOCRIT % 31.1* 38.1  --  44.2   < > 46.3   < > 51.8*   MCV fL 105.4* 108.5*  --  108.9*   < > 107.7*   < > 107.7*   MCHC g/dL 28.9* 28.3*  --  28.7*   < > 28.9*   < > 28.8*   PLATELETS 10*3/mm3 80* 123*  --  154   < > 156   < > 147   INR   --   --   --   --   --  1.36*  --   --    D DIMER QUANT MCGFEU/mL  --   --   --   --   --   --   --  >20.00*    < > = values in this interval  not displayed.     Acid/base balance:  Results from last 7 days   Lab Units 10/02/21  0753 10/02/21  0455 10/01/21  2051   PH, ARTERIAL pH units 7.300* 7.186* 7.300*   PO2 ART mm Hg 61.8* 55.8* 59.9*   PCO2, ARTERIAL mm Hg 27.7* 31.8* 26.5*   HCO3 ART mmol/L 13.6* 12.0* 13.0*     Renal and electrolytes:  Results from last 7 days   Lab Units 10/04/21  0339 10/03/21  2154 10/03/21  1601 10/03/21  1208 10/03/21  1208 10/03/21  0727 10/03/21  0727 10/02/21  0059 10/01/21  2132   SODIUM mmol/L 151* 154* 158*  --  163*  --  164*   < > 179*   POTASSIUM mmol/L 3.5 3.8 3.3*   < > 3.4*   < > 3.4*   < > 3.8   MAGNESIUM mg/dL  --   --   --   --   --   --   --   --  2.6*   CHLORIDE mmol/L 120* 123* 127*   < > 132*   < > 133*   < > >140*   CO2 mmol/L 11.2* 11.7* 13.4*   < > 14.4*   < > 14.5*   < > 13.7*   BUN mg/dL 77* 78* 83*   < > 86*   < > 87*   < > 93*   CREATININE mg/dL 2.42* 2.40* 2.78*  --  2.80*  --  2.83*   < > 2.94*   EGFR IF NONAFRICN AM mL/min/1.73 19* 19* 16*   < > 16*   < > 16*   < > 15*   CALCIUM mg/dL 8.5 8.4 6.2*   < > 6.1*   < > 6.3*   < > 7.5*    < > = values in this interval not displayed.     Estimated Creatinine Clearance: 9.2 mL/min (A) (by C-G formula based on SCr of 2.42 mg/dL (H)).    Liver and pancreatic function:  Results from last 7 days   Lab Units 10/02/21  0059 10/01/21  1400   ALBUMIN g/dL 2.48* 2.89*   BILIRUBIN mg/dL 0.5 0.6   ALK PHOS U/L 70 93   AST (SGOT) U/L 36* 32   ALT (SGPT) U/L 17 18         Cardiac:  Results from last 7 days   Lab Units 10/01/21  1400   PROBNP pg/mL 4,279.0*     Liver and pancreatic function:  Results from last 7 days   Lab Units 10/02/21  0059 10/01/21  1400   ALBUMIN g/dL 2.48* 2.89*   BILIRUBIN mg/dL 0.5 0.6   ALK PHOS U/L 70 93   AST (SGOT) U/L 36* 32   ALT (SGPT) U/L 17 18       Medications :     ascorbic acid, 500 mg, Oral, Daily  aspirin, 81 mg, Oral, Daily  castor oil-balsam peru, 1 application, Topical, Q12H  cefepime, 2 g, Intravenous, Q24H  famotidine, 20 mg,  Intravenous, Daily  heparin (porcine), 5,000 Units, Subcutaneous, Q12H  insulin aspart, 0-7 Units, Subcutaneous, Q6H - RT  metroNIDAZOLE, 500 mg, Intravenous, Q8H  sodium chloride, 10 mL, Intravenous, Q12H  sodium chloride, 10 mL, Intravenous, Q12H  sodium chloride, 10 mL, Intravenous, Q12H  sodium chloride, 10 mL, Intravenous, Q12H  sodium chloride, 10 mL, Intravenous, Q12H  sodium chloride, 10 mL, Intravenous, Q12H  Vancomycin Pharmacy Intermittent Dosing, , Does not apply, Daily  zinc sulfate, 220 mg, Oral, BID      amiodarone, 0.5 mg/min, Last Rate: 0.5 mg/min (10/04/21 0639)  dextrose, 75 mL/hr, Last Rate: 75 mL/hr (10/04/21 0132)  norepinephrine, 0.02-0.3 mcg/kg/min, Last Rate: 0.1 mcg/kg/min (10/03/21 1905)  phenylephrine, 0.5-3 mcg/kg/min, Last Rate: Stopped (10/03/21 1130)  sodium bicarbonate drip (greater than 75 mEq/bag), 75 mEq, Last Rate: Stopped (10/03/21 0843)  vasopressin (PITRESSIN) 20 units in 100 mL infusion, 0.03 Units/min, Last Rate: 0.03 Units/min (10/03/21 6008)          Assessment/Plan       1. Hypernatremia   2. Tripple disorder, Anion gap metabolic acidosis, non anion gap metabolic acidosis, respiratory acidosis  3. TAYLER on CKD  4. Sever sepsis  5. Septic shock  6. Demential      Sodium further better, remains on pressors and high flow O2,  And persistent metabolic acidosis, will add 75meq/L sodium bicarb in D5W and continue at 75ml/h, will check sodium daily now.   Ongoing transition to comfort measures per Dr. Narvaez.     TAYLER likely due to pre renal/early ATN  Cr on admission was 2.9, improving tto 2.6        Matilda Belle MD  10/04/21  08:05 EDT

## 2021-10-05 NOTE — PROGRESS NOTES
I received paperwork this evening from the [facility] cabinet [facility] health and family services department for aging and independent living stating that the cabinet has given approval for termination of life prolonging treatment, with holding of care and to provide comfort measures effective 10/4/2021.  This is in addition to the changing CODE STATUS to DO NOT RESUSCITATE that was effective on 10/1/2021.    Orders have been placed in the computer to reflect this.

## 2021-10-05 NOTE — PROGRESS NOTES
[facility] HOSPITALIST DEATH NOTE    Date of death:  10/5/2021  Patient time of death occurred at 03:47 am.    Savana Jimenez MD  10/05/21  03:53 EDT

## 2021-10-06 LAB
BACTERIA SPEC AEROBE CULT: NORMAL
BACTERIA SPEC AEROBE CULT: NORMAL

## 2021-10-09 NOTE — DISCHARGE SUMMARY
Discharge Summary:    Date of Admission: 10/1/2021  Date of Discharge: 10/5/2021    PCP: Cristal Linn MD     Please note that this is a Death Summary    Date of death: 10/05/2021  Time of death: 0347    DISCHARGE DIAGNOSIS  -Severe sepsis with septic shock, present on admission  -Bilateral pneumonia  -Acute hypoxic respiratory failure secondary to pneumonia  -Large right-sided pneumothorax status post chest tube placement  -Severe lactic acidosis  -Severe hypernatremia due to dehydration  -Acute kidney injury on CKD stage IIIa  -New onset atrial fibrillation  -New onset thrombocytopenia    SECONDARY DIAGNOSES  Past Medical History:   Diagnosis Date   • Advanced age    • Cardiomegaly    • CHF (congestive heart failure) ([facility])    • Chronic kidney disease (CKD), stage III (moderate) ([facility])    • Cognitive communication deficit    • Dementia ([facility])    • Dysphagia    • Gait instability    • Grade I diastolic dysfunction    • Hypertension    • Malnutrition ([facility])        CONSULTS   Dr. Belle - Nephrology  Dr. Person - Pulmonary/Critical Care  Dr Guzmán - Cardiology  Dr. Bee - General Surgery    PROCEDURES PERFORMED  Right thoracostomy tube placement    HOSPITAL COURSE  Patient is a 89 y.o. female presented to [facility] complaining of severe hyponatremia, septic shock.  Please see the admitting history and physical for further details.      Patient was admitted to the hospital medicine service.  Patient was given intravenous fluids and aggressive intravenous antibiotics for her hypernatremia and pneumonia.  Patient did require multiple vasopressors for blood pressure support.  The patient developed atrial fibrillation with rapid ventricular response.  The patient also did develop a right-sided pneumothorax and required a chest tube placement per general surgery.    Unfortunately, despite aggressive medical management, the patient continued to do poorly and remained hypotensive and hypoxic.  Patient was  admitted with DNR/DNI status from the nursing home.  It was felt by myself and multiple other physicians that the patient would best be served by transitioning to comfort measures.  Approval was obtained by the cabinet for health and family services/department for aging and independent living that the patient was appropriate to be converted to full comfort measures.    The patient was started on as needed Ativan, Morphine and Robinul for comfort.     Patient  on 10/5/21 at 0347.    Please refer to chart for further details regarding her course of care.     DISCHARGE DISPOSITION       Lynda Narvaez DO  10/09/21  06:30 EDT